# Patient Record
Sex: FEMALE | Race: WHITE | Employment: UNEMPLOYED | URBAN - METROPOLITAN AREA
[De-identification: names, ages, dates, MRNs, and addresses within clinical notes are randomized per-mention and may not be internally consistent; named-entity substitution may affect disease eponyms.]

---

## 2020-07-21 ENCOUNTER — OFFICE VISIT (OUTPATIENT)
Dept: FAMILY MEDICINE | Facility: CLINIC | Age: 25
End: 2020-07-21

## 2020-07-21 VITALS
SYSTOLIC BLOOD PRESSURE: 109 MMHG | RESPIRATION RATE: 16 BRPM | HEART RATE: 97 BPM | DIASTOLIC BLOOD PRESSURE: 77 MMHG | TEMPERATURE: 98.5 F | HEIGHT: 64 IN | BODY MASS INDEX: 33.36 KG/M2 | OXYGEN SATURATION: 96 % | WEIGHT: 195.4 LBS

## 2020-07-21 DIAGNOSIS — K21.9 GASTROESOPHAGEAL REFLUX DISEASE, ESOPHAGITIS PRESENCE NOT SPECIFIED: ICD-10-CM

## 2020-07-21 DIAGNOSIS — Z00.00 HEALTHCARE MAINTENANCE: ICD-10-CM

## 2020-07-21 DIAGNOSIS — F33.42 MAJOR DEPRESSIVE DISORDER, RECURRENT, IN FULL REMISSION (H): ICD-10-CM

## 2020-07-21 DIAGNOSIS — F64.9 GENDER DYSPHORIA: Primary | ICD-10-CM

## 2020-07-21 DIAGNOSIS — F41.9 ANXIETY: ICD-10-CM

## 2020-07-21 DIAGNOSIS — G43.109 MIGRAINE WITH AURA AND WITHOUT STATUS MIGRAINOSUS, NOT INTRACTABLE: ICD-10-CM

## 2020-07-21 DIAGNOSIS — Z11.3 ROUTINE SCREENING FOR STI (SEXUALLY TRANSMITTED INFECTION): ICD-10-CM

## 2020-07-21 DIAGNOSIS — F90.0 ATTENTION DEFICIT HYPERACTIVITY DISORDER (ADHD), PREDOMINANTLY INATTENTIVE TYPE: ICD-10-CM

## 2020-07-21 PROBLEM — F90.9 ADHD (ATTENTION DEFICIT HYPERACTIVITY DISORDER): Status: ACTIVE | Noted: 2020-07-21

## 2020-07-21 LAB
ALBUMIN SERPL-MCNC: 4.3 MG/DL (ref 3.8–5)
ALP SERPL-CCNC: 68 U/L (ref 31.7–110.5)
ALT SERPL-CCNC: 27.4 U/L (ref 0–45)
AST SERPL-CCNC: 14.6 U/L (ref 0–45)
BILIRUB SERPL-MCNC: <0.4 MG/DL (ref 0.2–1.3)
BUN SERPL-MCNC: 10.7 MG/DL (ref 7–19)
CALCIUM SERPL-MCNC: 9.3 MG/DL (ref 8.5–10.1)
CHLORIDE SERPLBLD-SCNC: 100.7 MMOL/L (ref 98–110)
CHOLEST SERPL-MCNC: 158 MG/DL
CO2 SERPL-SCNC: 28.3 MMOL/L (ref 20–32)
CREAT SERPL-MCNC: 0.7 MG/DL (ref 0.5–1)
GFR SERPL CREATININE-BSD FRML MDRD: >90 ML/MIN/1.7 M2
GLUCOSE SERPL-MCNC: 83.9 MG'DL (ref 70–99)
HDLC SERPL-MCNC: 46 MG/DL
LDLC SERPL CALC-MCNC: 92 MG/DL
NONHDLC SERPL-MCNC: 112 MG/DL
POTASSIUM SERPL-SCNC: 3.2 MMOL/L (ref 3.3–4.5)
PROT SERPL-MCNC: 7.8 G/DL (ref 6.8–8.8)
SODIUM SERPL-SCNC: 135.2 MMOL/L (ref 132.6–141.4)
TRIGL SERPL-MCNC: 103 MG/DL

## 2020-07-21 RX ORDER — SUMATRIPTAN 25 MG/1
25 TABLET, FILM COATED ORAL
COMMUNITY

## 2020-07-21 RX ORDER — OMEGA-3 FATTY ACIDS/FISH OIL 300-1000MG
200 CAPSULE ORAL EVERY 4 HOURS PRN
COMMUNITY

## 2020-07-21 RX ORDER — DEXTROAMPHETAMINE SACCHARATE, AMPHETAMINE ASPARTATE, DEXTROAMPHETAMINE SULFATE AND AMPHETAMINE SULFATE 5; 5; 5; 5 MG/1; MG/1; MG/1; MG/1
20 TABLET ORAL 2 TIMES DAILY
COMMUNITY
End: 2020-09-17

## 2020-07-21 ASSESSMENT — MIFFLIN-ST. JEOR: SCORE: 1625.3
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
## 2020-07-21 NOTE — PATIENT INSTRUCTIONS
After Visit Summary    1. Review testosterone consent form and basics of masculinizing hormone therapy handout  2. Labs and shots today  3. Someone should call you about scheduling top surgery consult.    Schedule follow up with Gender support clinic (Dr. Rosi Healy) or Dr. Stefanie Rueda.     Rosi Monroe & Stefanie

## 2020-07-22 ENCOUNTER — TELEPHONE (OUTPATIENT)
Dept: PLASTIC SURGERY | Facility: CLINIC | Age: 25
End: 2020-07-22

## 2020-07-22 LAB
HIV 1+2 AB+HIV1 P24 AG SERPL QL IA: NONREACTIVE
T PALLIDUM AB SER QL: NONREACTIVE

## 2020-07-22 NOTE — TELEPHONE ENCOUNTER
[facility]:  Care Coordination Note     SITUATION   Elder Aaron (he/him) is a 24 year old adult who is receiving support for:  Clinic Care Coordination - Initial  .    BACKGROUND     Pt is seeking top surgery.    Pt does not smoke and is not diabetic    Pt [facility] and was given Fax info to get [place] [facility] [place]    Pt mentioned wanting to have consult with Dr. Grant. Pt was informed that the consult with Dr. Bailey would happen first and that is pt was a canidate they would be referred to Dr. Grant.      ASSESSMENT     Surgery              [facility]  [facility] ([facility] Enrollment: (P) Enrolled  Patient has a therapist: (P) Yes  Name of therapist: (P) Fabiola Guaman  Therapist's phone number: (P) [phone]  Letter of support #1: (P) Requested  Surgery being considered: (P) Yes  Mastectomy: (P) Yes          PLAN     Follow-up plan:  Pt to get [place] [facility] and to attend scheduled Consult-       Josselyn Braun

## 2020-07-28 ENCOUNTER — OFFICE VISIT (OUTPATIENT)
Dept: FAMILY MEDICINE | Facility: CLINIC | Age: 25
End: 2020-07-28

## 2020-07-28 ENCOUNTER — ALLIED HEALTH/NURSE VISIT (OUTPATIENT)
Dept: FAMILY MEDICINE | Facility: CLINIC | Age: 25
End: 2020-07-28

## 2020-07-28 VITALS
TEMPERATURE: 98.2 F | SYSTOLIC BLOOD PRESSURE: 103 MMHG | BODY MASS INDEX: 32.49 KG/M2 | OXYGEN SATURATION: 97 % | DIASTOLIC BLOOD PRESSURE: 76 MMHG | HEIGHT: 65 IN | WEIGHT: 195 LBS | HEART RATE: 84 BPM | RESPIRATION RATE: 16 BRPM

## 2020-07-28 DIAGNOSIS — F64.9 GENDER DYSPHORIA: Primary | ICD-10-CM

## 2020-07-28 DIAGNOSIS — F90.0 ATTENTION DEFICIT HYPERACTIVITY DISORDER (ADHD), PREDOMINANTLY INATTENTIVE TYPE: ICD-10-CM

## 2020-07-28 LAB
% GRANULOCYTES: 49.3 %G (ref 40–75)
GRANULOCYTES #: 4.2 K/UL (ref 1.6–8.3)
HCT VFR BLD AUTO: 42.5 % (ref 35–47)
HEMOGLOBIN: 12.9 G/DL (ref 11.7–15.7)
LYMPHOCYTES # BLD AUTO: 3.8 K/UL (ref 0.8–5.3)
LYMPHOCYTES NFR BLD AUTO: 43.8 %L (ref 20–48)
MCH RBC QN AUTO: 28.3 PG (ref 26.5–35)
MCHC RBC AUTO-ENTMCNC: 30.4 G/DL (ref 32–36)
MCV RBC AUTO: 93.2 FL (ref 78–100)
MID #: 0.6 K/UL (ref 0–2.2)
MID %: 6.9 %M (ref 0–20)
PLATELET # BLD AUTO: 254 K/UL (ref 150–450)
RBC # BLD AUTO: 4.56 M/UL (ref 3.8–5.2)
WBC # BLD AUTO: 8.6 K/UL (ref 4–11)

## 2020-07-28 RX ORDER — TESTOSTERONE CYPIONATE 1000 MG/10ML
50 INJECTION, SOLUTION INTRAMUSCULAR WEEKLY
Qty: 10 ML | Refills: 1 | Status: SHIPPED | OUTPATIENT
Start: 2020-07-28 | End: 2020-12-16

## 2020-07-28 RX ORDER — NEEDLES, SAFETY 22GX1 1/2"
1 NEEDLE, DISPOSABLE MISCELLANEOUS WEEKLY
Qty: 1 EACH | Refills: 1 | Status: SHIPPED | OUTPATIENT
Start: 2020-07-28 | End: 2020-07-28

## 2020-07-28 RX ORDER — NEEDLES, SAFETY 22GX1 1/2"
1 NEEDLE, DISPOSABLE MISCELLANEOUS WEEKLY
Qty: 50 EACH | Refills: 1 | Status: SHIPPED | OUTPATIENT
Start: 2020-07-28 | End: 2021-09-24

## 2020-07-28 RX ORDER — NEEDLES, DISPOSABLE 25GX5/8"
1 NEEDLE, DISPOSABLE MISCELLANEOUS WEEKLY
Qty: 50 EACH | Refills: 1 | Status: SHIPPED | OUTPATIENT
Start: 2020-07-28 | End: 2022-06-01

## 2020-07-28 RX ORDER — DEXTROAMPHETAMINE SACCHARATE, AMPHETAMINE ASPARTATE, DEXTROAMPHETAMINE SULFATE AND AMPHETAMINE SULFATE 2.5; 2.5; 2.5; 2.5 MG/1; MG/1; MG/1; MG/1
10 TABLET ORAL 2 TIMES DAILY
Qty: 60 TABLET | Refills: 0 | Status: SHIPPED | OUTPATIENT
Start: 2020-07-28 | End: 2021-08-09

## 2020-07-28 ASSESSMENT — MIFFLIN-ST. JEOR: SCORE: 1627.45

## 2020-07-28 NOTE — PROGRESS NOTES
Preceptor Attestation:   Patient seen and discussed with the resident. Dr. Rueda spoke with Dr. Healy.   Assessment and plan reviewed with resident and agreed upon.    Supervising Physician:  Farzad Torres MD  [facility]

## 2020-07-28 NOTE — NURSING NOTE
"Patient presented to clinic with all supplies for subcutaneous injection teaching. Patient's friend was present at visit.     RN reviewed necessary supplies: difference in needles (drawing up vs injecting), how to read a syringe, how to read medication label, disposal of sharps, and proper hand hygiene.     Discussed how to switch out needles and patient demonstrated understanding of reading syringe. RN reviewed safe needle handling (using \"scoop\" method). Patient independently juan pablo up proper amount of Testosterone.     RN discussed site for subcutaneous injection and reviewed proper subcutaneous injection technique. Patient practiced using injecting pad.    At end of visit, patient independently completed self-injection of 0.25 mL (50 mg) Testosterone into LLQ under supervision of RN.    Completed visit with discussion of refill policy.     Patient verbalized understanding and was engaged during appointment.    Dr. Maza was the preceptor on site for this visit and available for questions.    Rivka Nelson RN      "

## 2020-07-28 NOTE — PROGRESS NOTES
HPI     Elder is a 24 year old individual that uses pronouns He/Him/His/Himself that presents today for follow up of:  masculinizing hormone therapy.     Gender identity: Male    Any special concerns today?    Left side of neck with painful bump beginning yesterday. While showering noticed it above collar on left side. No recent fever, chills, nasal congestion, ear pain, cough, teary eyes, ear pain/facepain. No history of lumps/bumps. No family hisotory of lymphoma, leukemia blood/lymph cancers.    Pt recently moved from [facility]. Wants to establish mental health care in the area for stress and anxiety related to gender dysphoria. Also ran out of adderall 10mg po bid prn for adhd. Continues to see a therapist from [place] via telehealth. Feels good today. No SI.     Considering contraception wants to try the implant, hasn't been on contraception before. Not currently using any other contraception. LMP: 7/17/20 lasted 8 days. Light. Cycles alternate between short and heavy (where would change product every hr) to longer and light. Cycles are inconsistent interval, could be every month, but often every other month. Painful cramping a few days a cycle. Not intolerable this past cycle. Not currently having sex with sperm near reproductive organs. Not using  Hormones. Wants to get started on testosterone. Wants to try implant to have something doesn't have to take everyday. Currently has unpredictable periods isn't worried about breakthrough bleeding/spotting potential on implant.    Gender affirmation is being sought in these other ways: Persuing gender affirming top surgery, wants voice changed, wants facial hair.     ---    Past Surgical History:   Procedure Laterality Date     ADENOIDECTOMY       COSMETIC PLACEMENT OF DENTAL IMPLANT(S)       HC TOOTH EXTRACTION W/FORCEP       TONSILLECTOMY         Patient Active Problem List   Diagnosis     ADHD (attention deficit hyperactivity disorder)     Anxiety      "Major depressive disorder, recurrent, in full remission (H)     Gender dysphoria     Migraine with aura and without status migrainosus, not intractable     Acid reflux       Current Outpatient Medications   Medication Sig Dispense Refill     amphetamine-dextroamphetamine (ADDERALL) 10 MG tablet Take 1 tablet (10 mg) by mouth 2 times daily Twice daily as needed. 60 tablet 0     amphetamine-dextroamphetamine (ADDERALL) 20 MG tablet Take 20 mg by mouth 2 times daily       ibuprofen (ADVIL/MOTRIN) 200 MG capsule Take 200 mg by mouth every 4 hours as needed for fever       Needle, Disp, (BD DISP NEEDLE) 23G X 1\" MISC 1 Units once a week 50 each 1     Needle, Disp, (BD SAFETYGLIDE NEEDLE) 27G X 5/8\" MISC 1 Units once a week 1 each 1     SUMAtriptan (IMITREX) 25 MG tablet Take 25 mg by mouth at onset of headache for migraine       syringe, disposable, 1 ML MISC 1 Units once a week 60 each 1     testosterone cypionate (DEPOTESTOSTERONE) 100 MG/ML injection Inject 0.5 mLs (50 mg) into the muscle once a week 10 mL 1       History   Smoking Status     Never Smoker   Smokeless Tobacco     Never Used        No Known Allergies    Problem, Medication and Allergy Lists were reviewed and are current.  Mecial release signed, awaiting [facility] for hx of ADHD, Anxiety/depression.         Review of Systems:        General    Fat redistribution: no    Weight change: no HEENT    Voice change: no     Cardiovascular (CV)    Chest Pains: no    Shortness of breath: no Chest    Decreased exercise tolerance:  no    Breast changes/development: no     Gastrointestinal (GI)    Abdominal pain: no    Change in appetite: no Skin    Acne or oily skin: no    Change in hair: no     Genitourinary ()    Abnormal vaginal bleeding: No    Decreased spontaneous erections: not applicable    Change in libido: no    New sexual partners: no Musculoskeletal    Leg pain or swelling: no     Psychiatric " "(Psych)    Depression: no    Anxiety/Panic: no    Mood:  \"good\"                    Physical Exam:     Vitals:    07/28/20 0815   BP: 103/76   Pulse: 84   Resp: 16   Temp: 98.2  F (36.8  C)   TempSrc: Oral   SpO2: 97%   Weight: 88.5 kg (195 lb)   Height: 1.638 m (5' 4.5\")     BMI= Body mass index is 32.95 kg/m .   Wt Readings from Last 10 Encounters:   07/28/20 88.5 kg (195 lb)   07/21/20 88.6 kg (195 lb 6.4 oz)     Appearance: Masculine appearance and dress  GENERAL:: healthy, alert and no distress  EYES: Eyes grossly normal to inspection, allergic shiners bilaterally, no conjunctiva injection/discharge.  HENT: ear canals normal, Nose normal, Mouth- no ulcers, no lesions, no erythema, no discharge  NECK: Left inferior cervical lymph node area with 1.5 cm subcutaneous tender, pliable, mobile lump - no overlying skin change, no surrounding lymphadenopathy, no other adenopathy, no asymmetry, no masses,  Neck otherwise supple  Psych: coherent speech, normal rate and volume, able to articulate logical thoughts, able to abstract reason, no tangential thoughts, no hallucinations or delusions. Affect is appropriate mood-congruent           Labs:   Results from last visit:  Office Visit on 07/21/2020   Component Date Value Ref Range Status     Calcium 07/21/2020 9.3  8.5 - 10.1 mg/dL Final     Chloride 07/21/2020 100.7  98.0 - 110.0 mmol/L Final     Carbon Dioxide 07/21/2020 28.3  20.0 - 32.0 mmol/L Final     Creatinine 07/21/2020 0.7  0.5 - 1.0 mg/dL Final     Glucose 07/21/2020 83.9  70.0 - 99.0 mg'dL Final     Potassium 07/21/2020 3.2* 3.3 - 4.5 mmol/L Final     Sodium 07/21/2020 135.2  132.6 - 141.4 mmol/L Final     Protein Total 07/21/2020 7.8  6.8 - 8.8 g/dL Final     GFR Estimate 07/21/2020 >90  >60.0 mL/min/1.7 m2 Final     GFR Estimate If Black 07/21/2020 >90  >60.0 mL/min/1.7 m2 Final     Albumin 07/21/2020 4.3  3.8 - 5.0 mg/dL Final     Alkaline Phosphatase 07/21/2020 68.0  31.7 - 110.5 U/L Final     ALT " 07/21/2020 27.4  0.0 - 45.0 U/L Final     AST 07/21/2020 14.6  0.0 - 45.0 U/L Final     Bilirubin Total 07/21/2020 <0.4  0.2 - 1.3 mg/dL Final     Urea Nitrogen 07/21/2020 10.7  7.0 - 19.0 mg/dL Final     Cholesterol 07/21/2020 158  <200 mg/dL Final     Triglycerides 07/21/2020 103  <150 mg/dL Final     HDL Cholesterol 07/21/2020 46* >49 mg/dL Final     LDL Cholesterol Calculated 07/21/2020 92  <100 mg/dL Final    Desirable:       <100 mg/dl     Non HDL Cholesterol 07/21/2020 112  <130 mg/dL Final     Treponema Antibodies 07/21/2020 Nonreactive  NR^Nonreactive Final    Comment: Methodology Change: Test performed on the [employer] XL by Treponema   pallidum Total Antibodies Assay as of 3.17.2020.       HIV Antigen Antibody Combo 07/21/2020 Nonreactive  NR^Nonreactive     Final    HIV-1 p24 Ag & HIV-1/HIV-2 Ab Not Detected     Results for orders placed or performed in visit on 07/28/20   CBC with Diff Plt ([facility])     Status: Abnormal   Result Value Ref Range    WBC 8.6 4.0 - 11.0 K/uL    Lymphocytes # 3.8 0.8 - 5.3 K/uL    % Lymphocytes 43.8 20.0 - 48.0 %L    Mid # 0.6 0.0 - 2.2 K/uL    Mid % 6.9 0.0 - 20.0 %M    GRANULOCYTES # 4.2 1.6 - 8.3 K/uL    % Granulocytes 49.3 40.0 - 75.0 %G    RBC 4.56 3.80 - 5.20 M/uL    Hemoglobin 12.9 11.7 - 15.7 g/dL    Hematocrit 42.5 35.0 - 47.0 %    MCV 93.2 78.0 - 100.0 fL    MCH 28.3 26.5 - 35.0 pg    MCHC 30.4 (L) 32.0 - 36.0 g/dL    Platelets 254.0 150.0 - 450.0 K/uL         Assessment and Plan       Zed is a pleasant 24 year old transmale with recent diagnosis of gender dysphoria. Prior history includes MDD, ADHD, anxiety. Today's concerns include initiation of masculinizing hormonal therapy, maintenance of mental health, contraception, and a left anterior neck subcutaneous lump.     Gender dysphoria: Informed consent of testosterone therapy reviewed in person during visit, side effects to initiation and timelines associated with changes and potential side effects  discussed. Offered printed timeline of effects and expected time course of masculinizing hormones, patient declined. Reviewed lab results obtained prior to visit. No overt contraindications to hormone initiation. Patient's SUZANNA from prior psychiatric care signed, awaiting records. Patient desires to move forward with testosterone treatment with subcutaneous injection. Plan for starting testosterone dose (injectable): testosterone cypionate 50mg (0.25mL) q week. Orders for supplies and testosterone placed. Patient to make nursing visit appointment for education on injections prior to initiation.     Mental health maintenance: Currently well controlled mood with psychotherapy telehealth and adderall daily. Patient requesting establishment of care in this area. Plan for behavioral health referral for psychotherapy for continued management of stress/anxiety related to gender dysphoria. Adderall 10mg PO BID PRN refilled for one month. SUZANNA from psychiatrist in system, awaiting records. No acute safety concerns today. Patient to follow-up in 3 weeks for review of care.     Contraception: Patient requesting implantable contraception. Reviewed side effects to nexplanon, placed referral to procedure clinic for placement. Patient requesting procedural anxiolytic if available. Patient instructed to make appointment for next available placement. Reviewed need for excellent contraception while on testosterone if type of sex includes sperm. Patient voiced understanding and agreement with plan.    Lymphadenopathy: Suspect reactive lymph node on exam today based on current characteristics. Considered abscess, skin infection, insect irritation. Patient to continue observing. To return to clinic sooner than 3 weeks should fever, concerning skin changes, or any new concerning symptoms arise.     Follow up:  Follow up in 3 weeks.    Questions were elicited and answered. Patient was agreeable to plan of care.    Stefanie Rueda DO

## 2020-07-28 NOTE — PATIENT INSTRUCTIONS
Collect testosterone, syringes, & needles.  Make an appointment for a nurse visit for testoterone administration teaching.  Make an appointment for placement of nexplanon, clinic will contact you.  Make an appointment with the behavioral health team.   medication.  Follow-up in 3 weeks.     Dr. Stefanie Rueda

## 2020-07-29 ENCOUNTER — TELEPHONE (OUTPATIENT)
Dept: PSYCHOLOGY | Facility: CLINIC | Age: 25
End: 2020-07-29

## 2020-07-29 NOTE — TELEPHONE ENCOUNTER
Options for agencies with both psychiatry and therapy available:    [facility] (adults)  [address]  [place], [place]  [phone]  (other locations available, can call main #)    Glenny Billingsley  Phone: [phone]  Address: [address] [place] [address]    [facility] [place]  [address]  [place] [address]  [phone]    [facility] - psychiatry only for people who also go there for therapy  [facility]  [address]  [place], [place]  [phone]

## 2020-07-30 NOTE — TELEPHONE ENCOUNTER
Spoke with patient and gave all contact locations and numbers.    Zed was informed to check with insurance for coverage concerns. They were also informed to call the locations for a consultation.    Call the clinic with any issues.    Ester Webb CMA  [facility] Coordinator

## 2020-08-24 ENCOUNTER — OFFICE VISIT (OUTPATIENT)
Dept: FAMILY MEDICINE | Facility: CLINIC | Age: 25
End: 2020-08-24

## 2020-08-24 VITALS
HEIGHT: 65 IN | BODY MASS INDEX: 31.99 KG/M2 | HEART RATE: 88 BPM | WEIGHT: 192 LBS | OXYGEN SATURATION: 97 % | SYSTOLIC BLOOD PRESSURE: 99 MMHG | DIASTOLIC BLOOD PRESSURE: 70 MMHG

## 2020-08-24 DIAGNOSIS — Q38.6 FORDYCE SPOTS: ICD-10-CM

## 2020-08-24 DIAGNOSIS — F64.9 GENDER DYSPHORIA: Primary | ICD-10-CM

## 2020-08-24 ASSESSMENT — ANXIETY QUESTIONNAIRES
2. NOT BEING ABLE TO STOP OR CONTROL WORRYING: NOT AT ALL
5. BEING SO RESTLESS THAT IT IS HARD TO SIT STILL: NOT AT ALL
6. BECOMING EASILY ANNOYED OR IRRITABLE: SEVERAL DAYS
3. WORRYING TOO MUCH ABOUT DIFFERENT THINGS: SEVERAL DAYS
IF YOU CHECKED OFF ANY PROBLEMS ON THIS QUESTIONNAIRE, HOW DIFFICULT HAVE THESE PROBLEMS MADE IT FOR YOU TO DO YOUR WORK, TAKE CARE OF THINGS AT HOME, OR GET ALONG WITH OTHER PEOPLE: SOMEWHAT DIFFICULT
1. FEELING NERVOUS, ANXIOUS, OR ON EDGE: SEVERAL DAYS
7. FEELING AFRAID AS IF SOMETHING AWFUL MIGHT HAPPEN: NOT AT ALL
GAD7 TOTAL SCORE: 3

## 2020-08-24 ASSESSMENT — MIFFLIN-ST. JEOR: SCORE: 1613.85

## 2020-08-24 ASSESSMENT — PATIENT HEALTH QUESTIONNAIRE - PHQ9
SUM OF ALL RESPONSES TO PHQ QUESTIONS 1-9: 4
5. POOR APPETITE OR OVEREATING: NOT AT ALL

## 2020-08-24 NOTE — PATIENT INSTRUCTIONS
Here is the plan from today's visit    1. Gender dysphoria  Return to care 3 months for lab screening (morena for video visit)    Clinic will contact for the gyn referral.       Please call or return to clinic if you have new or concerning symptoms.    Thank you for coming to [facility] today.  Lab Testing:  **If you had lab testing today and your results are reassuring or normal they will be mailed to you or sent through [employer] within 7 days.   **If the lab tests need quick action we will call you with the results.  The phone number we will call with results is # [phone] (home) . If this is not the best number please call our clinic and change the number.  Medication Refills:  If you need any refills please call your pharmacy and they will contact us.   If you need to  your refill at a new pharmacy, please contact the new pharmacy directly. The new pharmacy will help you get your medications transferred faster.   Scheduling:  If you have any concerns about today's visit or wish to schedule another appointment please call our office during normal business hours [phone] (8-5:00 M-F)  If a referral was made to a [facility] and you don't get a call from central scheduling please call [phone].  If a Mammogram was ordered for you at [facility] call [phone] to schedule or change your appointment.  If you had an XRay/CT/Ultrasound/MRI ordered the number is [phone] to schedule or change your radiology appointment.   Medical Concerns:  If you have urgent medical concerns please call [phone] at any time of the day.    Steafnie Rueda, DO

## 2020-08-24 NOTE — PROGRESS NOTES
HPI     Elder is a 24 year old individual that uses pronouns He/Him/His/Himself that presents today for follow up of:  masculinizing hormone therapy.     Gender identity: Male    Concerns: Noticed change with bumps around labia that are more prominent, on both sides, not painful not associated with discharge. Both partners tested for STIs with negative results. No new partners. Had them over last few years, gotten more as years have gone on, since testosterone they're more prominent, doesn't hurt mostly a texture thing. No redness, No bleeding. Has recent picture of them on phone.     Has never had a pelvic exam, one time tried to do it, couldn't get speculum advanced as felt very uncomfortble very intense, hasn't been able to use tampons/menstrual cup, any manipulation of area is very uncomfortable (apart from dysphoria) it's physically very uncomfortable. Generally thinks would have to be sedated or numbed because couldn't sit still through an exam. No history of physical trauma, no sexual trauma sexual abuse history. LMP 7/16/20, will skip periods occasionally not having sex involving sperm.    Has vaginal dryness compared to before and clitoral growth but not other noticeable changes since starting testosterone. Not bothered by the changes. No discomfort.  On hormones?  YES +++ Shot day of the week? Tuesday      Due for labs?  No      +++ Refills of meds needed?  Yes (has refills left)     Gender affirmation is being sought in these other ways:   Has initiated care with psychotherapist  Has consult with surgeon for chest surgery- has appointment 11/20-    Past Surgical History:   Procedure Laterality Date     ADENOIDECTOMY       COSMETIC PLACEMENT OF DENTAL IMPLANT(S)       HC TOOTH EXTRACTION W/FORCEP       TONSILLECTOMY         History   Smoking Status     Never Smoker   Smokeless Tobacco     Never Used       Problem, Medication and Allergy Lists were   reviewed and are current.     Patient Active Problem  "List    Diagnosis Date Noted     ADHD (attention deficit hyperactivity disorder) 07/21/2020     Priority: Medium     Anxiety 07/21/2020     Priority: Medium     Major depressive disorder, recurrent, in full remission (H) 07/21/2020     Priority: Medium     Gender dysphoria 07/21/2020     Priority: Medium     Migraine with aura and without status migrainosus, not intractable 07/21/2020     Priority: Medium     Acid reflux 07/21/2020     Priority: Medium     Managed with OTC meds           Current Outpatient Medications   Medication Sig Dispense Refill     amphetamine-dextroamphetamine (ADDERALL) 10 MG tablet Take 1 tablet (10 mg) by mouth 2 times daily Twice daily as needed. 60 tablet 0     ibuprofen (ADVIL/MOTRIN) 200 MG capsule Take 200 mg by mouth every 4 hours as needed for fever       Needle, Disp, (BD DISP NEEDLE) 23G X 1\" MISC 1 Units once a week 50 each 1     Needle, Disp, (BD SAFETYGLIDE NEEDLE) 27G X 5/8\" MISC 1 Units once a week 50 each 1     SUMAtriptan (IMITREX) 25 MG tablet Take 25 mg by mouth at onset of headache for migraine       syringe, disposable, 1 ML MISC 1 Units once a week 60 each 1     testosterone cypionate (DEPOTESTOSTERONE) 100 MG/ML injection Inject 0.5 mLs (50 mg) into the muscle once a week 10 mL 1     amphetamine-dextroamphetamine (ADDERALL) 20 MG tablet Take 20 mg by mouth 2 times daily         No Known Allergies.         Review of Systems:        General    Fat redistribution: no    Weight change: no HEENT    Voice change: no     Cardiovascular (CV)    Chest Pains: no    Shortness of breath: no Chest    Decreased exercise tolerance:  no    Breast changes/development: no     Gastrointestinal (GI)    Abdominal pain: no    Change in appetite: no Skin    Acne or oily skin: yes, has gotten oiler    Change in hair: no     Genitourinary ()    Abnormal vaginal bleeding: no     Decreased spontaneous erections: not applicable    Change in libido: Yes, unclear if testosterone or if removed " "from [employer], not bothered by it    New sexual partners: No  Musculoskeletal    Leg pain or swelling: no     Psychiatric (Psych)    Depression: no    Anxiety/Panic: getting over stress of new environment stable otherwise    Mood:  comfortable                    Physical Exam:     Vitals:    08/24/20 1407   BP: 99/70   BP Location: Left arm   Patient Position: Sitting   Cuff Size: Adult Large   Pulse: 88   SpO2: 97%   Weight: 87.1 kg (192 lb)   Height: 1.638 m (5' 4.5\")     BMI= Body mass index is 32.45 kg/m .   Wt Readings from Last 10 Encounters:   08/24/20 87.1 kg (192 lb)   07/28/20 88.5 kg (195 lb)   07/21/20 88.6 kg (195 lb 6.4 oz)     Physical Exam  Vitals signs reviewed.   Constitutional:       General: He is not in acute distress.     Appearance: Normal appearance.   HENT:      Head: Normocephalic.      Right Ear: External ear normal.      Left Ear: External ear normal.   Eyes:      General: No scleral icterus.     Extraocular Movements: Extraocular movements intact.   Neck:      Musculoskeletal: Normal range of motion and neck supple.   Cardiovascular:      Rate and Rhythm: Normal rate and regular rhythm.      Pulses: Normal pulses.      Heart sounds: Normal heart sounds. No murmur.   Pulmonary:      Effort: Pulmonary effort is normal. No respiratory distress.      Breath sounds: Normal breath sounds. No wheezing or rhonchi.   Genitourinary:     Comments: Patient volunteered recent photo of labia minora. Verbal consent obtained prior to reviewing image on patient's phone. Medial aspect of labia minora with symmetrical distribution of 1-2 mm papules on both L and R sides no surrounding erythema/discharge.     Musculoskeletal:      Right lower leg: No edema.      Left lower leg: No edema.   Lymphadenopathy:      Cervical: No cervical adenopathy.   Skin:     General: Skin is warm and dry.   Neurological:      General: No focal deficit present.      Mental Status: He is alert.   Psychiatric:         Mood and " Affect: Mood normal.         Behavior: Behavior normal.         Thought Content: Thought content normal.         Judgment: Judgment normal.     Affect: Appropriate/mood-congruent            Labs:    Results from the last 24 hoursNo results found for this or any previous visit (from the past 24 hour(s)).    Assessment and Plan   Elder is a 24 year old transmale who presents in follow-up after initiation of masculinizing hormonal therapy. Currently tolerating testosterone 50mg subcutaneous weekly without overt side effects. Suspect enlarged sebaceous glands on labia minora based on photo appearance. No suspicion for condyloma accuminata/HSV/folliculitis based on appearance and symptoms.      Contraception:  Planned visit for nexplanon placement.     Preventative med: Patient currently getting HPV vaccine series. No prior PAP, given previous intolerance of gyn exam, referral placed for [facility] to consider procedural anxiolytic.     Follow up:  Follow up in 3 months with repeat labs at that time.     Questions were elicited and answered.     Stefanie Rueda, DO

## 2020-08-24 NOTE — PROGRESS NOTES
Preceptor Attestation:   Patient seen, evaluated and discussed with the resident. I have verified the content of the note, which accurately reflects my assessment of the patient and the plan of care.   Supervising Physician:  Lopez Law MD

## 2020-08-25 ENCOUNTER — OFFICE VISIT (OUTPATIENT)
Dept: FAMILY MEDICINE | Facility: CLINIC | Age: 25
End: 2020-08-25

## 2020-08-25 VITALS
TEMPERATURE: 97.9 F | BODY MASS INDEX: 32.26 KG/M2 | HEART RATE: 84 BPM | OXYGEN SATURATION: 95 % | RESPIRATION RATE: 16 BRPM | SYSTOLIC BLOOD PRESSURE: 93 MMHG | DIASTOLIC BLOOD PRESSURE: 71 MMHG | WEIGHT: 193.6 LBS | HEIGHT: 65 IN

## 2020-08-25 DIAGNOSIS — Z30.017 NEXPLANON INSERTION: Primary | ICD-10-CM

## 2020-08-25 DIAGNOSIS — Z30.017 INSERTION OF IMPLANTABLE SUBDERMAL CONTRACEPTIVE: ICD-10-CM

## 2020-08-25 RX ORDER — LORAZEPAM 1 MG/1
TABLET ORAL
Qty: 1 TABLET | Refills: 0 | Status: SHIPPED | OUTPATIENT
Start: 2020-08-25 | End: 2020-09-17

## 2020-08-25 ASSESSMENT — MIFFLIN-ST. JEOR: SCORE: 1621.1

## 2020-08-25 ASSESSMENT — ANXIETY QUESTIONNAIRES: GAD7 TOTAL SCORE: 3

## 2020-08-25 NOTE — PROGRESS NOTES
"Preceptor Attestation:   I discussed the patient with the resident. I was present for and supervised the entire procedure. I have verified the content of the note, which accurately reflects my assessment of the patient and the plan of care.   Supervising Physician:  Tootie Marcial MD.                   Procedure Note - Etonogestrel Implant Insertion     HPI: Elva \" Zed\" Agnieszka is a patient of Stefanie Castanon here for Nexplanon/Implanon (etonogestrel implant) insertion.   Indication: unwanted fertility  LMP   July 17, 2020  Prev Contraception? None - is in monogamous relationship with transmale so declined testing  Smoking?  No    Counselling and Consent:  Affirmation of informed consent was signed and scanned into the medical record. Risks, benefits and alternatives were discussed. Discussed potential side effects of the etonogestrel implant including the risk of irregular bleeding that may persist across the 3 yrs of use.    Patient's questions were elicited and answered.   Provided with 1 mg Ativan post consent process.      Procedure safety checklist was completed:  Yes  Time Out (Pause for the Cause) completed: Yes    Labs: UPT not done, patient declined, reasonable  Preoperative Diagnosis:  Hoping to have amenorrhea  Postoperative Diagnosis:  same     Technique:   Skin prep Betadine  Anesthesia 1% lidocaine  Suture  No   EBL:   minimal  Complications: No  Tolerance:  Pt tolerated procedure well and was in stable condition.     Pt was positioned on exam table with left arm flexed and externally rotated. Area was marked for insertion 8cm frm the medial epicondyle along the sulcus between the biceps and triceps. Anesthesia provided at the insertion site and along the insertion track and then the area was prepped with betadine. Etonogestrel implant was then inserted subdermally in usual fashion. Provider and patient confirmed placement by palpating the device. Pressure dressing applied and procedure complete. "     Follow up:  Pt was instructed to call if bleeding, severe pain or foul smell.  Instructed to remove pressure dressing after 24 hours, then may keep insertion site covered with a bandaid until it is healed.  Instructed that she requires removal or replacement of the device in 3 years.  Lot Number [id number]    Resident: Rosalba Malhotra MD  Faculty: Tootie Marcial MD present for and supervised this entire procedure.

## 2020-08-25 NOTE — PATIENT INSTRUCTIONS
NEXPLANON AFTERCARE INSTRUCTIONS     You may have some pain at the site of the Nexplanon insertion. You can help relieve the discomfort with Tylenol (acetaminophen), Aspirin or Advil (ibuprofen). If your discomfort worsens or you notice redness spreading on the skin around the insertion site, please call the clinic.       Irregular bleeding is common with Nexplanon, especially in the first 6-12 months of use. After one year, approximately 20% of women who use Nexplanon will stop having periods completely. Some women have longer, heavier periods. Some women will have increased spotting between periods. You may find that your periods may be hard to predict.       The Nexplanon does not protect against sexually transmitted infections including the AIDS virus (HIV), warts (HPV), gonorrhea, Chlamydia, and herpes. Condoms should be used to decrease the risk sexually transmitted infections. If you think that you have been exposed to a sexually transmitted infection, please call the clinic.       If you had Nexplanon placed for birth control, it is effective immediately if it was inserted within five days after the start of your period. If you have Nexplanon inserted at any other time during your menstrual cycle, use another method of birth control, like condoms for at least 7 days.       The Nexplanon should be removed and/or replaced by a health care provider after five years.   Warning Signs   Call the clinic if any of the following occurs:     You have bleeding, pus, or increasing redness, or pain at insertion site.     You have fever or chills     The implant comes out or you have concerns about its location.     You have a positive pregnancy test or suspect you might be pregnant.     Scheduling:  If you have any concerns about today's visit or wish to schedule another appointment please call our office during normal business hours [phone] (8-5:00 M-F)  If a referral was made to a [facility] and you don't get a call from central scheduling please call [phone].  If a Mammogram was ordered for you at [facility] call [phone] to schedule or change your appointment.  If you had an XRay/CT/Ultrasound/MRI ordered the number is [phone] to schedule or change your radiology appointment.

## 2020-09-17 ENCOUNTER — OFFICE VISIT (OUTPATIENT)
Dept: FAMILY MEDICINE | Facility: CLINIC | Age: 25
End: 2020-09-17

## 2020-09-17 VITALS
BODY MASS INDEX: 32.95 KG/M2 | WEIGHT: 195 LBS | SYSTOLIC BLOOD PRESSURE: 104 MMHG | TEMPERATURE: 98.4 F | DIASTOLIC BLOOD PRESSURE: 73 MMHG | HEART RATE: 81 BPM | OXYGEN SATURATION: 99 %

## 2020-09-17 DIAGNOSIS — F90.0 ATTENTION DEFICIT HYPERACTIVITY DISORDER (ADHD), PREDOMINANTLY INATTENTIVE TYPE: Primary | ICD-10-CM

## 2020-09-17 DIAGNOSIS — F64.9 GENDER DYSPHORIA: ICD-10-CM

## 2020-09-17 DIAGNOSIS — Z23 IMMUNIZATION DUE: ICD-10-CM

## 2020-09-17 DIAGNOSIS — Z00.00 HEALTHCARE MAINTENANCE: ICD-10-CM

## 2020-09-17 RX ORDER — DEXTROAMPHETAMINE SACCHARATE, AMPHETAMINE ASPARTATE, DEXTROAMPHETAMINE SULFATE AND AMPHETAMINE SULFATE 2.5; 2.5; 2.5; 2.5 MG/1; MG/1; MG/1; MG/1
10 TABLET ORAL 2 TIMES DAILY
Qty: 60 TABLET | Refills: 0 | Status: SHIPPED | OUTPATIENT
Start: 2020-11-18 | End: 2020-12-18

## 2020-09-17 RX ORDER — DEXTROAMPHETAMINE SACCHARATE, AMPHETAMINE ASPARTATE, DEXTROAMPHETAMINE SULFATE AND AMPHETAMINE SULFATE 2.5; 2.5; 2.5; 2.5 MG/1; MG/1; MG/1; MG/1
10 TABLET ORAL 2 TIMES DAILY
Qty: 60 TABLET | Refills: 0 | Status: SHIPPED | OUTPATIENT
Start: 2020-09-17 | End: 2020-10-17

## 2020-09-17 RX ORDER — DEXTROAMPHETAMINE SACCHARATE, AMPHETAMINE ASPARTATE, DEXTROAMPHETAMINE SULFATE AND AMPHETAMINE SULFATE 2.5; 2.5; 2.5; 2.5 MG/1; MG/1; MG/1; MG/1
10 TABLET ORAL 2 TIMES DAILY
Qty: 60 TABLET | Refills: 0 | Status: SHIPPED | OUTPATIENT
Start: 2020-10-18 | End: 2020-11-17

## 2020-09-17 NOTE — PROGRESS NOTES
HPI       Elder Aaron is a 25 year old  who presents for   Chief Complaint   Patient presents with     Follow Up     f/u Refill Medication      ADHD Follow-Up (Adult)  Elder is a 25-year-old person who uses he him pronouns following up for concerns of ADHD.  Prev follow with therapist regularly and is working on gender dysphoria as well as inattention control.  Some difficult inattention especially recent times with political and social unrest, but symptoms well improved with medication.  Notes that he takes medications daily.  Declines concerns with sleep.  Has been on stable dose of 10 mg twice daily for many years, since approximately June year of high school.      Adherence and Exercise  Medication side effects: no     +++++++      Problem, Medication and Allergy Lists were reviewed and updated if needed..    Patient is an established patient of this clinic..         Review of Systems:   Review of Systems   ROS: 10 point ROS neg other than the symptoms noted above in the HPI.       Physical Exam:     Vitals:    09/17/20 1454   BP: 104/73   BP Location: Left arm   Patient Position: Sitting   Cuff Size: Adult Large   Pulse: 81   Temp: 98.4  F (36.9  C)   TempSrc: Oral   SpO2: 99%   Weight: 88.5 kg (195 lb)     Body mass index is 32.95 kg/m .  Vitals were reviewed and were normal     Physical Exam  General: Alert and oriented, in no acute distress.  Skin: Warm and dry, no abnormalities noted.  Eyes: Extra-ocular muscles intact, pupils equal and reactive.  ENT: Speech intact, nasal passages open, no hearing impairment noted.  CV: No cyanosis or pallor, warm and well perfused.  Respiratory: No respiratory distress, no accessory muscle use.  Neuro: Gait and station normal, comprehension intact. Gross and fine motor skills intact.   Psychiatric: Mood and affect appear normal.   Extremities: Warm, able to move all four extremities at will.      Results:   No testing ordered today    Assessment and Plan         Elva was seen today for follow up.    Diagnoses and all orders for this visit:    Attention deficit hyperactivity disorder (ADHD), predominantly inattentive type  -     amphetamine-dextroamphetamine (ADDERALL) 10 MG tablet; Take 1 tablet (10 mg) by mouth 2 times daily  -     amphetamine-dextroamphetamine (ADDERALL) 10 MG tablet; Take 1 tablet (10 mg) by mouth 2 times daily  -     amphetamine-dextroamphetamine (ADDERALL) 10 MG tablet; Take 1 tablet (10 mg) by mouth 2 times daily    Gender dysphoria    Immunization due  -     HPV9 (Gardasil 9 )    Healthcare maintenance    Elder is a 25-year-old person who uses he him pronouns doing well is not on stable dosage of Adderall 10 mg twice daily.  He is currently on immediate release formulation I did discuss today changing over to extended release formulation which is the preferred method for adults.  Patient is interested in switching, but with current deadlines at work would like to delay dose adjustments for another month.  I thought this was reasonable and patient could continue following up with primary care provider for this.  Refilled medications as above.  Also discussed gender dysphoria today in relation to following with behavioral health.  Patient reports a stable relationship with behavioral health provider in the past, but is in process of scheduling with new one.     Also discussed today that  Pt is due for immunization and CPE+PAP. Will schedule . Accepted HPV shot today.          There are no discontinued medications.    Options for treatment and follow-up care were reviewed with the patient. Elva Aaron  engaged in the decision making process and verbalized understanding of the options discussed and agreed with the final plan.    Kai York,

## 2020-09-27 DIAGNOSIS — F64.9 GENDER DYSPHORIA: Primary | ICD-10-CM

## 2020-09-30 RX ORDER — TESTOSTERONE CYPIONATE 200 MG/ML
INJECTION, SOLUTION INTRAMUSCULAR
Qty: 10 ML | Refills: 0 | Status: SHIPPED | OUTPATIENT
Start: 2020-09-30 | End: 2021-03-15

## 2020-09-30 NOTE — TELEPHONE ENCOUNTER

## 2020-11-20 ENCOUNTER — VIRTUAL VISIT (OUTPATIENT)
Dept: PLASTIC SURGERY | Facility: CLINIC | Age: 25
End: 2020-11-20
Attending: FAMILY MEDICINE
Payer: OTHER GOVERNMENT

## 2020-11-20 VITALS — HEIGHT: 64 IN | WEIGHT: 194 LBS | BODY MASS INDEX: 33.12 KG/M2

## 2020-11-20 DIAGNOSIS — F64.0 GENDER DYSPHORIA IN ADOLESCENT AND ADULT: Primary | ICD-10-CM

## 2020-11-20 PROCEDURE — 99202 OFFICE O/P NEW SF 15 MIN: CPT | Mod: 95 | Performed by: PLASTIC SURGERY

## 2020-11-20 ASSESSMENT — PAIN SCALES - GENERAL: PAINLEVEL: NO PAIN (0)

## 2020-11-20 ASSESSMENT — MIFFLIN-ST. JEOR: SCORE: 1609.98

## 2020-11-20 NOTE — PROGRESS NOTES
"Elva Aaron is a 25 year old adult who is being evaluated via a billable video visit.      The patient has been notified of following:     \"This video visit will be conducted via a call between you and your physician/provider. We have found that certain health care needs can be provided without the need for an in-person physical exam.  This service lets us provide the care you need with a video conversation.  If a prescription is necessary we can send it directly to your pharmacy.  If lab work is needed we can place an order for that and you can then stop by our lab to have the test done at a later time.    Video visits are billed at different rates depending on your insurance coverage.  Please reach out to your insurance provider with any questions.    If during the course of the call the physician/provider feels a video visit is not appropriate, you will not be charged for this service.\"    Patient has given verbal consent for Video visit? Yes  How would you like to obtain your AVS? MyChart  If you are dropped from the video visit, the video invite should be resent to: Text to cell phone: [phone]  Will anyone else be joining your video visit? No        Video-Visit Details    Type of service:  Video Visit    Video Start Time: 840a  Video End Time: 9:04 AM    Originating Location (pt. Location): Home    Distant Location (provider location):  [facility] [place]     Platform used for Video Visit: [facility]        PLASTIC SURGERY HISTORY AND PHYSICAL    Chief Complaint: Gender dysphoria, requesting top surgery.     HPI: Patient is a 25 year old trans-person who prefers he/him pronouns, requesting top surgery.  Patient has been considering undergoing top surgery for the past 10 years.  By undergoing top surgery, the patient would like to achieve congruence between the  physical body with chosen gender identity.  The patient transitioned 7 years ago with " "friends but is not fully out to parents.  Reports supportive family and friends.  Chosen name is Elder.  The patient has been on testosterone therapy for the past 4 months.  Denies any previous breast history. Denies smoking. Patient reports maternal grandmother with history of blood clots.      PMH:   No past medical history on file.    PSH:   Past Surgical History:   Procedure Laterality Date     ADENOIDECTOMY       COSMETIC PLACEMENT OF DENTAL IMPLANT(S)       HC TOOTH EXTRACTION W/FORCEP       TONSILLECTOMY         FH:   Family History   Problem Relation Age of Onset     Clotting Disorder Maternal Grandmother      Cancer Maternal Grandfather         Colon Ca     Thyroid Disease Mother         SH:   Social History     Tobacco Use     Smoking status: Never Smoker     Smokeless tobacco: Never Used   Substance Use Topics     Alcohol use: Yes     Comment: occ.     Drug use: Never      Lives in [place]. Grew up in [place]. Moved to [place] around end of June, partially as desire to start transitioning. Not currently working. Living with partner.    MEDS:     Current Outpatient Medications:      amphetamine-dextroamphetamine (ADDERALL) 10 MG tablet, Take 1 tablet (10 mg) by mouth 2 times daily, Disp: 60 tablet, Rfl: 0     amphetamine-dextroamphetamine (ADDERALL) 10 MG tablet, Take 1 tablet (10 mg) by mouth 2 times daily Twice daily as needed., Disp: 60 tablet, Rfl: 0     ibuprofen (ADVIL/MOTRIN) 200 MG capsule, Take 200 mg by mouth every 4 hours as needed for fever, Disp: , Rfl:      Needle, Disp, (BD DISP NEEDLE) 23G X 1\" MISC, 1 Units once a week, Disp: 50 each, Rfl: 1     Needle, Disp, (BD SAFETYGLIDE NEEDLE) 27G X 5/8\" MISC, 1 Units once a week, Disp: 50 each, Rfl: 1     SUMAtriptan (IMITREX) 25 MG tablet, Take 25 mg by mouth at onset of headache for migraine, Disp: , Rfl:      syringe, disposable, 1 ML MISC, 1 Units once a week, Disp: 60 each, Rfl: 1     testosterone cypionate (DEPOTESTOSTERONE) 100 MG/ML " injection, Inject 0.5 mLs (50 mg) into the muscle once a week, Disp: 10 mL, Rfl: 1     testosterone cypionate (DEPOTESTOSTERONE) 200 MG/ML injection, INJECT 0.25 MLS INTRAMUSCULARLY ONCE A WEEK. SINGLE USE VIAL; DISCARD REMAINING VOLUME., Disp: 10 mL, Rfl: 0       ALLERGIES:   No Known Allergies     FH: None.     ROS: Negative except for HPI     PHYSICAL EXAMINATION:   Deferred given virtual visit     ASSESSMENT: Gender dysphoria, requesting top surgery.     PLAN: The patient is a potential candidate for bilateral simple complete mastectomy with free nipple graft reconstruction as a form of chest gender confirmation surgery. Patient has not yet provided us with a letter of support.  We will review this.  I am requesting a baseline screening mammogram.    I explained this outpatient procedure in detail today.  I explained the risks to include bleeding, infection, injury to surrounding structures, fluid collection, nipple or nipple graft loss, nipple sensory loss, change in nipple size, wound healing difficulties, contour deformity, dog ears, asymmetry, and need for revision surgery.  Patient accepts these risks and wishes to proceed with surgery.       Total time spent with patient was 30 min of which greater than 50% was in counseling.    Zenobia Bailey MD  Plastic & Reconstructive Surgery  Pager: [phone]

## 2020-11-20 NOTE — NURSING NOTE
"Chief Complaint   Patient presents with     Consult     new top consult (mast); he/him       Vitals:    11/20/20 0835   Weight: 88 kg (194 lb)   Height: 1.626 m (5' 4\")       Body mass index is 33.3 kg/m .    Chandler Moralez, EMT    "

## 2020-11-20 NOTE — LETTER
"11/20/2020       RE: Elva Aaron  [address]  [place] [address]     Dear Colleague,    Thank you for referring your patient, Elva Aaron, to the [facility] PLASTIC AND RECONSTRU[facility] SURGERY CLIN[facility] [place] at [facility]. Please see a copy of my visit note below.    Elva Aaron is a 25 year old adult who is being evaluated via a billable video visit.      The patient has been notified of following:     \"This video visit will be conducted via a call between you and your physician/provider. We have found that certain health care needs can be provided without the need for an in-person physical exam.  This service lets us provide the care you need with a video conversation.  If a prescription is necessary we can send it directly to your pharmacy.  If lab work is needed we can place an order for that and you can then stop by our lab to have the test done at a later time.    Video visits are billed at different rates depending on your insurance coverage.  Please reach out to your insurance provider with any questions.    If during the course of the call the physician/provider feels a video visit is not appropriate, you will not be charged for this service.\"    Patient has given verbal consent for Video visit? Yes  How would you like to obtain your AVS? MyChart  If you are dropped from the video visit, the video invite should be resent to: Text to cell phone: [phone]  Will anyone else be joining your video visit? No      Video-Visit Details    Type of service:  Video Visit    Video Start Time: 840a  Video End Time: 9:04 AM    Originating Location (pt. Location): Home    Distant Location (provider location):  [facility]LAST[facility] REC[facility]ERY CLIN[facility] [place]     Platform used for Video Visit: Doximity      PLASTIC SURGERY HISTORY AND PHYSICAL    Chief Complaint: Gender dysphoria, requesting top surgery.     HPI: Patient is a 25 " "year old trans-person who prefers he/him pronouns, requesting top surgery.  Patient has been considering undergoing top surgery for the past 10 years.  By undergoing top surgery, the patient would like to achieve congruence between the  physical body with chosen gender identity.  The patient transitioned 7 years ago with friends but is not fully out to parents.  Reports supportive family and friends.  Chosen name is Elder.  The patient has been on testosterone therapy for the past 4 months.  Denies any previous breast history. Denies smoking. Patient reports maternal grandmother with history of blood clots.      PMH:   No past medical history on file.    PSH:   Past Surgical History:   Procedure Laterality Date     ADENOIDECTOMY       COSMETIC PLACEMENT OF DENTAL IMPLANT(S)       HC TOOTH EXTRACTION W/FORCEP       TONSILLECTOMY         FH:   Family History   Problem Relation Age of Onset     Clotting Disorder Maternal Grandmother      Cancer Maternal Grandfather         Colon Ca     Thyroid Disease Mother         SH:   Social History     Tobacco Use     Smoking status: Never Smoker     Smokeless tobacco: Never Used   Substance Use Topics     Alcohol use: Yes     Comment: occ.     Drug use: Never      Lives in [facility]. Grew up in [place]. Moved to [place] around end of June, partially as desire to start transitioning. Not currently working. Living with partner.    MEDS:     Current Outpatient Medications:      amphetamine-dextroamphetamine (ADDERALL) 10 MG tablet, Take 1 tablet (10 mg) by mouth 2 times daily, Disp: 60 tablet, Rfl: 0     amphetamine-dextroamphetamine (ADDERALL) 10 MG tablet, Take 1 tablet (10 mg) by mouth 2 times daily Twice daily as needed., Disp: 60 tablet, Rfl: 0     ibuprofen (ADVIL/MOTRIN) 200 MG capsule, Take 200 mg by mouth every 4 hours as needed for fever, Disp: , Rfl:      Needle, Disp, (BD DISP NEEDLE) 23G X 1\" MISC, 1 Units once a week, Disp: 50 each, Rfl: 1     Needle, Disp, (BD " "SAFETYGLIDE NEEDLE) 27G X 5/8\" MISC, 1 Units once a week, Disp: 50 each, Rfl: 1     SUMAtriptan (IMITREX) 25 MG tablet, Take 25 mg by mouth at onset of headache for migraine, Disp: , Rfl:      syringe, disposable, 1 ML MISC, 1 Units once a week, Disp: 60 each, Rfl: 1     testosterone cypionate (DEPOTESTOSTERONE) 100 MG/ML injection, Inject 0.5 mLs (50 mg) into the muscle once a week, Disp: 10 mL, Rfl: 1     testosterone cypionate (DEPOTESTOSTERONE) 200 MG/ML injection, INJECT 0.25 MLS INTRAMUSCULARLY ONCE A WEEK. SINGLE USE VIAL; DISCARD REMAINING VOLUME., Disp: 10 mL, Rfl: 0       ALLERGIES:   No Known Allergies     FH: None.     ROS: Negative except for HPI     PHYSICAL EXAMINATION:   Deferred given virtual visit     ASSESSMENT: Gender dysphoria, requesting top surgery.     PLAN: The patient is a potential candidate for bilateral simple complete mastectomy with free nipple graft reconstruction as a form of chest gender confirmation surgery. Patient has not yet provided us with a letter of support.  We will review this.  I am requesting a baseline screening mammogram.    I explained this outpatient procedure in detail today.  I explained the risks to include bleeding, infection, injury to surrounding structures, fluid collection, nipple or nipple graft loss, nipple sensory loss, change in nipple size, wound healing difficulties, contour deformity, dog ears, asymmetry, and need for revision surgery.  Patient accepts these risks and wishes to proceed with surgery.       Total time spent with patient was 30 min of which greater than 50% was in counseling.    Zenobia Bailey MD  Plastic & Reconstructive Surgery  Pager: [phone]        "

## 2020-11-30 ENCOUNTER — OFFICE VISIT (OUTPATIENT)
Dept: FAMILY MEDICINE | Facility: CLINIC | Age: 25
End: 2020-11-30
Payer: OTHER GOVERNMENT

## 2020-11-30 ENCOUNTER — TELEPHONE (OUTPATIENT)
Dept: PSYCHOLOGY | Facility: CLINIC | Age: 25
End: 2020-11-30

## 2020-11-30 VITALS
BODY MASS INDEX: 32.68 KG/M2 | HEART RATE: 95 BPM | RESPIRATION RATE: 16 BRPM | OXYGEN SATURATION: 99 % | DIASTOLIC BLOOD PRESSURE: 85 MMHG | SYSTOLIC BLOOD PRESSURE: 139 MMHG | TEMPERATURE: 98.4 F | WEIGHT: 190.4 LBS

## 2020-11-30 DIAGNOSIS — F90.8 ATTENTION DEFICIT HYPERACTIVITY DISORDER (ADHD), OTHER TYPE: ICD-10-CM

## 2020-11-30 DIAGNOSIS — F64.9 GENDER DYSPHORIA: Primary | ICD-10-CM

## 2020-11-30 DIAGNOSIS — Z23 NEED FOR PROPHYLACTIC VACCINATION AND INOCULATION AGAINST INFLUENZA: ICD-10-CM

## 2020-11-30 PROCEDURE — 99214 OFFICE O/P EST MOD 30 MIN: CPT | Mod: 25 | Performed by: STUDENT IN AN ORGANIZED HEALTH CARE EDUCATION/TRAINING PROGRAM

## 2020-11-30 PROCEDURE — 90686 IIV4 VACC NO PRSV 0.5 ML IM: CPT | Performed by: STUDENT IN AN ORGANIZED HEALTH CARE EDUCATION/TRAINING PROGRAM

## 2020-11-30 PROCEDURE — 90471 IMMUNIZATION ADMIN: CPT | Performed by: STUDENT IN AN ORGANIZED HEALTH CARE EDUCATION/TRAINING PROGRAM

## 2020-11-30 RX ORDER — DEXTROAMPHETAMINE SACCHARATE, AMPHETAMINE ASPARTATE MONOHYDRATE, DEXTROAMPHETAMINE SULFATE AND AMPHETAMINE SULFATE 2.5; 2.5; 2.5; 2.5 MG/1; MG/1; MG/1; MG/1
10 CAPSULE, EXTENDED RELEASE ORAL DAILY
Qty: 30 CAPSULE | Refills: 0 | Status: SHIPPED | OUTPATIENT
Start: 2020-11-30 | End: 2021-11-12

## 2020-11-30 NOTE — PATIENT INSTRUCTIONS
Here is the plan from today's visit:    1. Midcycle labs Friday - make a lab only appointment  2. New adderall xr script today - if side effects concerning make a follow-up appt (video / okay)  3. Referrals placed for psychology, gender support clinic for consideration of hysterectomy/oophrectomy phalloplasty.   4. Dosage adjustment of testosterone based on blood work results. Will contact via phone with results.   5. Phone number for mammogram below.    Best,   Stefanie Rueda,         Thank you for coming to [facility] today.  Lab Testing:  **If you had lab testing today and your results are reassuring or normal they will be mailed to you or sent through [employer] within 7 days.   **If the lab tests need quick action we will call you with the results.  The phone number we will call with results is # [phone] (home) . If this is not the best number please call our clinic and change the number.  Medication Refills:  If you need any refills please call your pharmacy and they will contact us.   If you need to  your refill at a new pharmacy, please contact the new pharmacy directly. The new pharmacy will help you get your medications transferred faster.   Scheduling:  If you have any concerns about today's visit or wish to schedule another appointment please call our office during normal business hours [phone] (8-5:00 M-F)  If a referral was made to a [facility] and you don't get a call from central scheduling please call [phone].  If a Mammogram was ordered for you at [facility] call [phone] to schedule or change your appointment.  If you had an XRay/CT/Ultrasound/MRI ordered the number is [phone] to schedule or change your radiology appointment.   Medical Concerns:  If you have urgent medical concerns please call [phone] at any time of the day.    Stefanie Rueda DO

## 2020-11-30 NOTE — TELEPHONE ENCOUNTER
Mental Health Referral:  Please schedule with Dr. Field      Please let patient know this is for primary care behavioral health services.  Therapists at our clinic are able to see patients for 8-12 sessions (video/phone). If further assistance is needed, they will help the patient connect with ongoing services in the community.    Check with the patient if they are able to do a video visit.  They will need access to either a smartphone or a laptop with camera/microphone.  If they can do a video visit, please schedule as a video visit.  If they do not have the technology to do a video visit, please schedule as a telephone visit. For either visit, please put the phone number or email in the appt notes that the provider is supposed to call or send the visit invite.        If you are unable to reach the patient after two phone attempts, please send a letter and close the encounter.      Thank you!

## 2020-11-30 NOTE — PROGRESS NOTES
HPI     Elder is a 25 year old individual that uses pronouns He/Him/His/Himself that presents today for follow up of:  masculinizing hormone therapy.     On hormones? testosterone 50mg subcutaneous weekly  YES +++ Shot day of the week? Tuesday      Due for labs?  Yes      +++ Refills of meds needed?  Yes    Had consult with surgeon for chest surgery- needs mammogram and letter of support for raiza.  Got a request for a mamogram - wasn't told how to make an appointment for that mammogram  - would like direction how to schedule  Wants to get internal referral to behavioral for letter of support for top surgery as has been unable to find therapist since transferring care.    Tried initiating care with psychotherapist - used list of providers from 7/28 visit. Has either not been called back or providers have not accepted  insurance. Psychotherapy cost-prohibitive if insurance not accepted. Feels that MDD, ARIK currently controlled. ADHD wise needs mediation refill.    For ADHD - not currently working - but does art and projects during the day - looses focus or motivation on those project when doesn't take meds.   Typically takes adderal IR one time a day around noon/1pm. Tries not to take past 2pm otherwise will stay up late in night.  Notes symptoms prior to taking it - or if doesn't take.   Started taking meds diaz year of high school school improved after taking med.    Has a lot more cramping - last menstrual cycle old tissue spread out over a month  - not sure what's up. Started Oct 24-25 lasted through nov. Has the nexplanon. This is the only time this happened since nexplanon placed. Sexual active no sperm near reproductive organs.   Would like hysterectomy and oophrectomy as well. Wants to pursue phalloplasty as well.   Wondering if can go up on dosing.       ---    Past Surgical History:   Procedure Laterality Date     ADENOIDECTOMY       COSMETIC PLACEMENT OF DENTAL IMPLANT(S)       HC TOOTH  "EXTRACTION W/FORCEP       TONSILLECTOMY         Patient Active Problem List   Diagnosis     ADHD (attention deficit hyperactivity disorder)     Anxiety     Major depressive disorder, recurrent, in full remission (H)     Gender dysphoria     Migraine with aura and without status migrainosus, not intractable     Acid reflux       Current Outpatient Medications   Medication Sig Dispense Refill     amphetamine-dextroamphetamine (ADDERALL) 10 MG tablet Take 1 tablet (10 mg) by mouth 2 times daily 60 tablet 0     amphetamine-dextroamphetamine (ADDERALL) 10 MG tablet Take 1 tablet (10 mg) by mouth 2 times daily Twice daily as needed. 60 tablet 0     ibuprofen (ADVIL/MOTRIN) 200 MG capsule Take 200 mg by mouth every 4 hours as needed for fever       Needle, Disp, (BD DISP NEEDLE) 23G X 1\" MISC 1 Units once a week 50 each 1     Needle, Disp, (BD SAFETYGLIDE NEEDLE) 27G X 5/8\" MISC 1 Units once a week 50 each 1     SUMAtriptan (IMITREX) 25 MG tablet Take 25 mg by mouth at onset of headache for migraine       syringe, disposable, 1 ML MISC 1 Units once a week 60 each 1     testosterone cypionate (DEPOTESTOSTERONE) 100 MG/ML injection Inject 0.5 mLs (50 mg) into the muscle once a week 10 mL 1     testosterone cypionate (DEPOTESTOSTERONE) 200 MG/ML injection INJECT 0.25 MLS INTRAMUSCULARLY ONCE A WEEK. SINGLE USE VIAL; DISCARD REMAINING VOLUME. 10 mL 0       History   Smoking Status     Never Smoker   Smokeless Tobacco     Never Used        No Known Allergies    Problem, Medication and Allergy Lists were reviewed and are current..         Review of Systems:        General    Fat redistribution: no    Weight change: More activ HEENT    Voice change: YES     Cardiovascular (CV)    Chest Pains: no    Shortness of breath: no Chest    Decreased exercise tolerance:  no    Breast changes/development: YES     Gastrointestinal (GI)    Abdominal pain: no    Change in appetite: YES -increase if not enough protein feels sluggish " Skin    Acne or oily skin: YES    Change in hair: facial hair - arm hair and thigh hair is increase     Genitourinary ()    Abnormal vaginal bleeding: YES     Decreased spontaneous erections: not applicable    Change in libido: YES - incease - if orgasm painful cramping - not excrutiating but harsh period - no dischage no concerns for vaginal infections    New sexual partners: no Musculoskeletal    Leg pain or swelling: no     Psychiatric (Psych)    Depression: no    Anxiety/Panic: no    Mood:  neutral                    Physical Exam:   There were no vitals filed for this visit.  BMI= There is no height or weight on file to calculate BMI.   Wt Readings from Last 10 Encounters:   11/20/20 88 kg (194 lb)   09/17/20 88.5 kg (195 lb)   08/25/20 87.8 kg (193 lb 9.6 oz)   08/24/20 87.1 kg (192 lb)   07/28/20 88.5 kg (195 lb)   07/21/20 88.6 kg (195 lb 6.4 oz)       GENERAL:: healthy, alert and no distress  EYES: Eyes grossly normal to inspection, sclera anicteric  RESP: lungs clear to auscultation - no rales, no rhonchi, no wheezes  CV: regular rates and rhythm, normal S1 S2,  and no murmur, no click or rub   ABDOMEN: soft, no tenderness, no  hepatosplenomegaly, no masses,  MS: extremities normal- no gross deformities noted, no edema at LE BL  SKIN: no suspicious lesions, no rashes at exposed skin  Psych: Alert and oriented times 3; coherent speech, normal rate and volume, able to articulate logical thoughts, able to abstract reason, no tangential thoughts, no hallucinations or delusions. Affect is normal           Labs:    Results from the last 24 hoursNo results found for this or any previous visit (from the past 24 hour(s)).    Assessment and Plan     Elder is a 25 year old transmale who presents in follow-up after initiation of masculinizing hormonal therapy. Currently tolerating testosterone 50mg subcutaneous weekly without overt side effects. Plan for screening Hgb, lipids, cmp, total testosterone level. Goal total  testosterone 300-1000. Will consider dosage adjustments (increase to testosterone cyprionate 75mg q week vs continuing 50mg q week) pending labs. Referral placed for comprehensive gender care team in support of hysterectomy oophrectomy and phalloplasty referrals. MDD and ARIK well controlled, as unable to establish care thus far since moving discussed case with Dr. Field in clinic regarding internal referral to behavioral health. Referral placed. Request for ADHD med refill, reviewed prior evaluation and current symptoms will dose adjust to adderall XR 10mg po daily. Recommended return to care if not tolerating med. Otherwise return to care in 3 months for lab follow-up.    Results by dylant  Questions were elicited and answered.     Stefanie Rueda DO  [facility], PGY-1

## 2020-12-02 ENCOUNTER — PATIENT OUTREACH (OUTPATIENT)
Dept: PLASTIC SURGERY | Facility: CLINIC | Age: 25
End: 2020-12-02

## 2020-12-02 NOTE — PROGRESS NOTES
Called pt after top consult with Dr. Bailey. Pt has current mental health provider who will provide letter of support.     Pt plan on calling to get an appointment for the mammogram.     Pt had no questions.     Once we receive letter and mammogram results we will be ready to place surgery orders.     Juaquin Tyler, [facility]  Transgender Care Coordinator

## 2020-12-04 DIAGNOSIS — F64.9 GENDER DYSPHORIA: ICD-10-CM

## 2020-12-04 LAB
ALBUMIN SERPL-MCNC: 4.2 MG/DL (ref 3.8–5)
ALP SERPL-CCNC: 64.2 U/L (ref 31.7–110.5)
ALT SERPL-CCNC: 12.9 U/L (ref 0–45)
AST SERPL-CCNC: 13.1 U/L (ref 0–45)
BILIRUB SERPL-MCNC: <0.4 MG/DL (ref 0.2–1.3)
BUN SERPL-MCNC: 8.1 MG/DL (ref 7–19)
CALCIUM SERPL-MCNC: 9.4 MG/DL (ref 8.5–10.1)
CHLORIDE SERPLBLD-SCNC: 99.9 MMOL/L (ref 98–110)
CHOLEST SERPL-MCNC: 113.3 MG/DL (ref 0–200)
CHOLEST/HDLC SERPL: 3.5 {RATIO} (ref 0–5)
CO2 SERPL-SCNC: 21.8 MMOL/L (ref 20–32)
CREAT SERPL-MCNC: 0.7 MG/DL (ref 0.5–1)
GFR SERPL CREATININE-BSD FRML MDRD: >90 ML/MIN/1.7 M2
GLUCOSE SERPL-MCNC: 89 MG'DL (ref 70–99)
HDLC SERPL-MCNC: 32.4 MG/DL
HEMOGLOBIN: 12.7 G/DL (ref 11.7–15.7)
LDLC SERPL CALC-MCNC: 68 MG/DL (ref 0–129)
POTASSIUM SERPL-SCNC: 3.7 MMOL/L (ref 3.3–4.5)
PROT SERPL-MCNC: 7.1 G/DL (ref 6.8–8.8)
SODIUM SERPL-SCNC: 132.9 MMOL/L (ref 132.6–141.4)
TRIGL SERPL-MCNC: 63.4 MG/DL (ref 0–150)
VLDL CHOLESTEROL: 12.7 MG/DL (ref 7–32)

## 2020-12-04 PROCEDURE — 36415 COLL VENOUS BLD VENIPUNCTURE: CPT

## 2020-12-04 PROCEDURE — 99000 SPECIMEN HANDLING OFFICE-LAB: CPT | Performed by: FAMILY MEDICINE

## 2020-12-04 PROCEDURE — 85018 HEMOGLOBIN: CPT

## 2020-12-04 PROCEDURE — 84403 ASSAY OF TOTAL TESTOSTERONE: CPT | Performed by: FAMILY MEDICINE

## 2020-12-04 PROCEDURE — 80061 LIPID PANEL: CPT

## 2020-12-04 PROCEDURE — 80053 COMPREHEN METABOLIC PANEL: CPT

## 2020-12-08 DIAGNOSIS — F64.9 GENDER DYSPHORIA: ICD-10-CM

## 2020-12-08 LAB — TESTOST SERPL-MCNC: 332 NG/DL (ref 8–60)

## 2020-12-09 DIAGNOSIS — F64.9 GENDER DYSPHORIA: ICD-10-CM

## 2020-12-09 RX ORDER — TESTOSTERONE CYPIONATE 1000 MG/10ML
50 INJECTION, SOLUTION INTRAMUSCULAR WEEKLY
Qty: 10 ML | Refills: 1 | Status: CANCELLED | OUTPATIENT
Start: 2020-12-09

## 2020-12-09 NOTE — PROGRESS NOTES
Preceptor Attestation:   Patient seen, evaluated and discussed with the resident. I have verified the content of the note, which accurately reflects my assessment of the patient and the plan of care.   Supervising Physician:  Yue Jean Baptiste, DO

## 2020-12-09 NOTE — TELEPHONE ENCOUNTER
"Request for medication refill:Testosterone    Providers if patient needs an appointment and you are willing to give a one month supply please refill for one month and  send a letter/MyChart using \".SMILLIMITEDREFILL\" .smillimited and route chart to \"P SMI \" (Giving one month refill in non controlled medications is strongly recommended before denial)    If refill has been denied, meaning absolutely no refills without visit, please complete the smart phrase \".smirxrefuse\" and route it to the \"P SMI MED REFILLS\"  pool to inform the patient and the pharmacy.    Billie Garcia, CMA        "

## 2020-12-11 ENCOUNTER — TELEPHONE (OUTPATIENT)
Dept: PLASTIC SURGERY | Facility: CLINIC | Age: 25
End: 2020-12-11

## 2020-12-11 DIAGNOSIS — F64.0 GENDER DYSPHORIA IN ADOLESCENT AND ADULT: Primary | ICD-10-CM

## 2020-12-11 NOTE — TELEPHONE ENCOUNTER
[facility]:  Care Coordination Note     SITUATION   Patient (Elder, he/him) is a 25 year old who is receiving support for:  Care Team (referral follow up)  .    BACKGROUND     Pt referred by Dr. Rueda at [facility] for hysterectomy, oophorectomy, phalloplasty. Called pt to discuss referral to [facility], completed intake for phalloplasty. Discussed 2 LOS requirement, pt has one therapist, writer to send list of therapist referrals via mail. Writer to send referral to [facility] for hysterectomy/oophorectomy consult.     ASSESSMENT     Surgery              [facility] Assessment  [facility] [place]ment: Enrolled  Patient has a therapist: Yes  Letter of support #1: Requested  Letter of support #2: Requested  Surgery being considered: Yes  Mastectomy: Yes  Phalloplasty: Yes    Pt does not smoke, no diabetes. HRT use for 4 months. In the process of scheduling top surgery with Dr. Bailey. Working with therapist to get 1 LOS for top surgery, will work on 2 LOS for phalloplasty. Writer to send therapist referrals via mail.         PLAN          Nursing Interventions:      Follow-up plan:    1. Writer to send referral to [facility] for hysterectomy/oophorectomy    2. Writer to mail therapist referrals to pt    3. Obtain 1 LOS for top surgery    4. Obtain 2 LOS for phalloplasty    5. Schedule phalloplasty consultation       Elma Tinsley

## 2020-12-15 ENCOUNTER — MYC REFILL (OUTPATIENT)
Dept: ORTHOPEDICS | Facility: CLINIC | Age: 25
End: 2020-12-15

## 2020-12-15 DIAGNOSIS — F64.9 GENDER DYSPHORIA: ICD-10-CM

## 2020-12-15 NOTE — TELEPHONE ENCOUNTER
Testosterone 200mg/mL last filled 10/01/2020 for #10mL     Thank you,    Miguelina Huber, PharmD  [facility]  [phone]

## 2020-12-16 RX ORDER — TESTOSTERONE CYPIONATE 200 MG/ML
60 INJECTION, SOLUTION INTRAMUSCULAR WEEKLY
Qty: 4 ML | Refills: 3 | Status: CANCELLED | OUTPATIENT
Start: 2020-12-16

## 2020-12-16 RX ORDER — TESTOSTERONE CYPIONATE 200 MG/ML
60 INJECTION, SOLUTION INTRAMUSCULAR WEEKLY
Qty: 10 ML | Refills: 0 | Status: CANCELLED | OUTPATIENT
Start: 2020-12-16

## 2020-12-16 RX ORDER — TESTOSTERONE CYPIONATE 1000 MG/10ML
60 INJECTION, SOLUTION INTRAMUSCULAR WEEKLY
Qty: 4 ML | Refills: 3 | Status: SHIPPED | OUTPATIENT
Start: 2020-12-16 | End: 2021-03-15

## 2020-12-16 NOTE — TELEPHONE ENCOUNTER

## 2020-12-16 NOTE — TELEPHONE ENCOUNTER
Encounter was made on 12/09/2020 refill request for the same medication.    Will reroute to PCP.     Nichole Gacria MA

## 2020-12-22 ENCOUNTER — VIRTUAL VISIT (OUTPATIENT)
Dept: PSYCHOLOGY | Facility: CLINIC | Age: 25
End: 2020-12-22
Payer: OTHER GOVERNMENT

## 2020-12-22 DIAGNOSIS — F41.9 ANXIETY: ICD-10-CM

## 2020-12-22 DIAGNOSIS — F64.9 GENDER DYSPHORIA: ICD-10-CM

## 2020-12-22 DIAGNOSIS — F33.42 MAJOR DEPRESSIVE DISORDER, RECURRENT, IN FULL REMISSION (H): ICD-10-CM

## 2020-12-22 DIAGNOSIS — F90.0 ATTENTION DEFICIT HYPERACTIVITY DISORDER (ADHD), PREDOMINANTLY INATTENTIVE TYPE: Primary | ICD-10-CM

## 2020-12-22 PROCEDURE — 90837 PSYTX W PT 60 MINUTES: CPT | Mod: 95 | Performed by: PSYCHOLOGIST
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
## 2021-01-04 ENCOUNTER — HEALTH MAINTENANCE LETTER (OUTPATIENT)
Age: 26
End: 2021-01-04

## 2021-01-04 ENCOUNTER — MYC REFILL (OUTPATIENT)
Dept: FAMILY MEDICINE | Facility: CLINIC | Age: 26
End: 2021-01-04

## 2021-01-04 DIAGNOSIS — F90.8 ATTENTION DEFICIT HYPERACTIVITY DISORDER (ADHD), OTHER TYPE: ICD-10-CM

## 2021-01-04 NOTE — TELEPHONE ENCOUNTER
"Patient requesting refill of Adderall 10 mg capsules. Last office visit 11/30/20 with Dr. Rueda. RN unable to fill as it is a controlled substance. Routing to PCP to approve if appropriate.   Elsie Fuentes RN      Request for medication refill:    Providers if patient needs an appointment and you are willing to give a one month supply please refill for one month and  send a letter/MyChart using \".SMILLIMITEDREFILL\" .smillimited and route chart to \"P SMI \" (Giving one month refill in non controlled medications is strongly recommended before denial)    If refill has been denied, meaning absolutely no refills without visit, please complete the smart phrase \".smirxrefuse\" and route it to the \"P SMI MED REFILLS\"  pool to inform the patient and the pharmacy.          "

## 2021-01-05 RX ORDER — DEXTROAMPHETAMINE SACCHARATE, AMPHETAMINE ASPARTATE MONOHYDRATE, DEXTROAMPHETAMINE SULFATE AND AMPHETAMINE SULFATE 2.5; 2.5; 2.5; 2.5 MG/1; MG/1; MG/1; MG/1
10 CAPSULE, EXTENDED RELEASE ORAL DAILY
Qty: 30 CAPSULE | Refills: 0 | Status: SHIPPED | OUTPATIENT
Start: 2021-01-05 | End: 2021-02-14

## 2021-01-05 RX ORDER — DEXTROAMPHETAMINE SACCHARATE, AMPHETAMINE ASPARTATE MONOHYDRATE, DEXTROAMPHETAMINE SULFATE AND AMPHETAMINE SULFATE 2.5; 2.5; 2.5; 2.5 MG/1; MG/1; MG/1; MG/1
10 CAPSULE, EXTENDED RELEASE ORAL DAILY
Qty: 30 CAPSULE | Refills: 0 | Status: SHIPPED | OUTPATIENT
Start: 2021-02-05 | End: 2021-03-07

## 2021-01-05 RX ORDER — DEXTROAMPHETAMINE SACCHARATE, AMPHETAMINE ASPARTATE MONOHYDRATE, DEXTROAMPHETAMINE SULFATE AND AMPHETAMINE SULFATE 2.5; 2.5; 2.5; 2.5 MG/1; MG/1; MG/1; MG/1
10 CAPSULE, EXTENDED RELEASE ORAL DAILY
Qty: 30 CAPSULE | Refills: 3 | Status: CANCELLED | OUTPATIENT
Start: 2021-01-05

## 2021-01-05 NOTE — TELEPHONE ENCOUNTER
Hi there,    Want to approve this request and give 3 months of refills. Let me know if any questions. Thanks!  Stefanie

## 2021-01-05 NOTE — TELEPHONE ENCOUNTER
Elder was seen today for refill request.    Diagnoses and all orders for this visit:    Attention deficit hyperactivity disorder (ADHD), other type  -     amphetamine-dextroamphetamine (ADDERALL XR) 10 MG 24 hr capsule; Take 1 capsule (10 mg) by mouth daily  -     amphetamine-dextroamphetamine (ADDERALL XR) 10 MG 24 hr capsule; Take 1 capsule (10 mg) by mouth daily    Last seen 11/30. Will give 2 months refills as patient is due for follow up q 3 months for controlled substances. (Due 3/1)    Kai York, DO

## 2021-01-14 ENCOUNTER — VIRTUAL VISIT (OUTPATIENT)
Dept: PSYCHOLOGY | Facility: CLINIC | Age: 26
End: 2021-01-14
Payer: OTHER GOVERNMENT

## 2021-01-14 DIAGNOSIS — F90.0 ATTENTION DEFICIT HYPERACTIVITY DISORDER (ADHD), PREDOMINANTLY INATTENTIVE TYPE: Primary | ICD-10-CM

## 2021-01-14 DIAGNOSIS — F64.9 GENDER DYSPHORIA: ICD-10-CM

## 2021-01-14 DIAGNOSIS — F41.9 ANXIETY: ICD-10-CM

## 2021-01-14 DIAGNOSIS — F33.42 MAJOR DEPRESSIVE DISORDER, RECURRENT, IN FULL REMISSION (H): ICD-10-CM

## 2021-01-14 PROCEDURE — 90837 PSYTX W PT 60 MINUTES: CPT | Mod: 95 | Performed by: PSYCHOLOGIST
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
## 2021-02-04 ENCOUNTER — VIRTUAL VISIT (OUTPATIENT)
Dept: PSYCHOLOGY | Facility: CLINIC | Age: 26
End: 2021-02-04
Payer: OTHER GOVERNMENT

## 2021-02-04 DIAGNOSIS — F64.9 GENDER DYSPHORIA: ICD-10-CM

## 2021-02-04 DIAGNOSIS — F41.9 ANXIETY: ICD-10-CM

## 2021-02-04 DIAGNOSIS — F33.42 MAJOR DEPRESSIVE DISORDER, RECURRENT, IN FULL REMISSION (H): ICD-10-CM

## 2021-02-04 DIAGNOSIS — F90.0 ATTENTION DEFICIT HYPERACTIVITY DISORDER (ADHD), PREDOMINANTLY INATTENTIVE TYPE: Primary | ICD-10-CM

## 2021-02-04 PROCEDURE — 90832 PSYTX W PT 30 MINUTES: CPT | Mod: 95 | Performed by: PSYCHOLOGIST

## 2021-02-04 NOTE — PROGRESS NOTES
Behavioral Health Progress Note    Client Legal Name:  Elva Aaron   Client Preferred Name:  Elder   Service Type:  Individual  Length of Visit:  20 minutes (11 am - 11:20 pm via phone)  Attendees: patient    Attendees:  patient      Telemedicine Visit: The patient's condition can be safely assessed and treated via a telephone encounter.       Reason for Telemedicine Visit: Services only offered telehealth     Originating Site (Patient Location): Patient's home     Distant Site (Provider Location): [facility] Outpatient Setting: hospitals     Consent:  The patient/guardian has verbally consented to: the potential risks and benefits of telemedicine (video visit) versus in person care; bill my insurance or make self-payment for services provided; and responsibility for payment of non-covered services.      Mode of Communication:  telephone     As the provider I attest to compliance with applicable laws and regulations related to telemedicine.     Emergency contact: Chandler Benavides, [phone]  Closest Emergency Room:[facility]  Location at time of call: at home     Identifying Information and Presenting Problem:  Elder is a 25 year old American adult who was referred for therapy for depression and anxiety, as well as evaluation to potentially provide a letter of support for top surgery     Treatment Objective(s) Addressed in This Session:  N/a evaluation only at this time - will develop treatment goals at a future appt     Progress on / Status of Treatment Objective(s) / Homework:  N/a        PHQ-9 SCORE 8/24/2020   PHQ-9 Total Score 4     ARIK-7 SCORE 8/24/2020   Total Score 3       Topics Discussed/Interventions Provided:  Shared with Elder that based on his current mental health status it seems appropriate to write a letter of support for gender affirming surgery. Reviewed the contents of the letter with Johniain so he was aware of the content.  Asked if he had any questions about this letter and he stated that he did  not have any other questions at this time.  Provided information on the availability of the letter in the chart that his team will be able to access - I do not need to send it separately since it is the same health system.      Clarified with Elder expectations for ongoing therapy.  Elder stated that he would like to engage in ongoing therapy now that the evaluation is complete for additional support as he goes through the surgery and continues to navigate     Assessment:   The patient appeared to be active and engaged in today's session and was receptive to feedback.     Mental Status:   Elder was on time for his therapy session. Patient was oriented to all spheres.  He was cooperative and open throughout the session.  Speech was thoughtful, normal rate and rhythm.  Stated mood was good.  Affect was appropriate to stated mood and thought content. Attention span and concentration were intact. Memory and cognition were intact, no deficits noted. Thought processes were linear, logical, and goal-directed. Thought content was void of delusions or hallucinations. Patient did not report suicidal ideation, plan, or intent. Insight and judgment were good.      Does the patient appear to be at imminent risk of harm to self/others at this time?  No     The session was necessary to review letter of support for gender affirming surgery, as well as discussion regarding continuation of services following the evaluation.        DSM-5 Diagnosis:  ADHD  MDD, in full remission  Anxiety  Gender Dysphoria     Plan:  1. Follow up on      Pilar Field PsyD, LP      NOTE: Treatment plan update due after DA is complete.  Diagnostic assessment update due in 2-3 sessions.                                              [address] [place] [address],  [address]  [facility] [address]  [phone]         2021    Re: Legal name: Elva Aaron  Preferred name: Elder  : 1995    Behavioral health letter of  support for Transgender Mastectomy Surgery    Dr.Hsieh Elva Aaron, who goes by the name Elder has obtained behavioral health services at [facility] since July 2020.   Elder  is a 25 year old transgender person who initially sought services to for support with gender transition and making decisions about medical interventions to address their gender dysphoria. Their most recent appointment for individual psychotherapy occurred on February 4, 2021.    Elder completed a diagnostic assessment which included evaluating psychosocial adjustment, sexual health, and overall psychological functioning. His chart was also reviewed.    Elder has been diagnosed with gender dysphoria. He currently meets criteria for ADHD which is treated with Adderrall.  He has a previous diagnosis of Major Depressive disorder, but this is currently in full remission.   They have exhibited a persistent and long-standing gender and anatomic dysphoria related to their birth-assigned sex. He has experienced anxiety and depression for periods of time in adjustment to their process of disclosing their identity to their family and friends, but does not meet criteria for any anxiety or mood disorders at this time. They are well adjusted and thoughtful, and has approached their decision making for surgery with intention and care. Elder s mental health concerns are stable and well controlled, and do not pose any barrier to their ability to consent and undergo gender-affirming surgery at this time.    Elder has met all the recommended criteria for the proposed surgery.These criteria are consistent with the [employer] Transgender [employer] s Standards of Care for the Health of Transsexual, Transgender and Gender Nonconforming People, and support this procedure as medically necessary for Elder's overall health.    This surgery will very likely decrease their gender dysphoria and psychological distress and improve their  overall psychological functioning.  In some cases, denial of access to this surgery can lead to increased isolation, decreased work performance, and decreased sexual and interpersonal functioning. Surgery is expected to increase comfort with self, and improve interpersonal, sexual, and vocational functioning.    Elder has educated themselves about the surgery and is aware of its risks and benefits. This behavioral health recommendation is based on the psychological indication for surgery and did not include a physical examination to assess medical contra-indications for the surgical procedure. This letter of support is valid for one year from the date of approval.      If you have any questions, or are in need of additional information, please do not hesitate to contact me at [phone].    Sincerely,    Pilar Field PsyD, ROGER  Behavioral Health Clinician

## 2021-02-14 ENCOUNTER — MYC REFILL (OUTPATIENT)
Dept: FAMILY MEDICINE | Facility: CLINIC | Age: 26
End: 2021-02-14

## 2021-02-14 DIAGNOSIS — F90.8 ATTENTION DEFICIT HYPERACTIVITY DISORDER (ADHD), OTHER TYPE: ICD-10-CM

## 2021-02-15 NOTE — TELEPHONE ENCOUNTER

## 2021-02-19 RX ORDER — DEXTROAMPHETAMINE SACCHARATE, AMPHETAMINE ASPARTATE MONOHYDRATE, DEXTROAMPHETAMINE SULFATE AND AMPHETAMINE SULFATE 2.5; 2.5; 2.5; 2.5 MG/1; MG/1; MG/1; MG/1
10 CAPSULE, EXTENDED RELEASE ORAL DAILY
Qty: 15 CAPSULE | Refills: 0 | Status: SHIPPED | OUTPATIENT
Start: 2021-02-19 | End: 2021-03-06

## 2021-02-19 NOTE — TELEPHONE ENCOUNTER
Writing 15 day limited supply. Recommend a follow-up for ADHD for longer term supply video vs in person. Routing to advisor for controlled Rx sign. Routing to  to book appointment.

## 2021-02-24 ENCOUNTER — VIRTUAL VISIT (OUTPATIENT)
Dept: PSYCHOLOGY | Facility: CLINIC | Age: 26
End: 2021-02-24
Payer: OTHER GOVERNMENT

## 2021-02-24 DIAGNOSIS — F41.9 ANXIETY: ICD-10-CM

## 2021-02-24 DIAGNOSIS — F64.9 GENDER DYSPHORIA: ICD-10-CM

## 2021-02-24 DIAGNOSIS — F90.0 ATTENTION DEFICIT HYPERACTIVITY DISORDER (ADHD), PREDOMINANTLY INATTENTIVE TYPE: Primary | ICD-10-CM

## 2021-02-24 DIAGNOSIS — F33.42 MAJOR DEPRESSIVE DISORDER, RECURRENT, IN FULL REMISSION (H): ICD-10-CM

## 2021-02-24 PROCEDURE — 90834 PSYTX W PT 45 MINUTES: CPT | Mod: 95 | Performed by: PSYCHOLOGIST

## 2021-02-24 NOTE — PROGRESS NOTES
Behavioral Health Progress Note    Client Legal Name:  Elva Aaron   Client Preferred Name:  Elder   Service Type:  Individual  Length of Visit:  1:20 - 2 pm  Attendees: patient    Attendees:  patient      Telemedicine Visit: The patient's condition can be safely assessed and treated via a telephone encounter.       Reason for Telemedicine Visit: Services only offered telehealth     Originating Site (Patient Location): Patient's home     Distant Site (Provider Location): [facility] Outpatient Setting: [facility]     Consent:  The patient/guardian has verbally consented to: the potential risks and benefits of telemedicine (video visit) versus in person care; bill my insurance or make self-payment for services provided; and responsibility for payment of non-covered services.      Mode of Communication:  telephone     As the provider I attest to compliance with applicable laws and regulations related to telemedicine.     Emergency contact: Chandler Benavides, [phone]  Closest Emergency Room:[facility]  Location at time of call: at home     Identifying Information and Presenting Problem:  Elder is a 25 year old American adult who was referred for therapy to assist with relapse prevention of depression and anxiety, as well as support letter for gender surgery     Treatment Objective(s) Addressed in This Session:  N/a evaluation only at this time - will develop treatment goals at a future appt     Progress on / Status of Treatment Objective(s) / Homework:  Elder reports depression and anxiety have continued to be well controlled        PHQ-9 SCORE 8/24/2020   PHQ-9 Total Score 4     ARIK-7 SCORE 8/24/2020   Total Score 3       Topics Discussed/Interventions Provided:  Checked to see if Elder had any further questions/concerns in regard to the support level.  He stated he believed that the team had received the letter and that he did not have any other questions.  Elder would like to transition to therapy now to assist in  "maintaining all the gains he has made in terms of treating his depression/anxiety, as well as for support as he continues through this process.      Primary focus of today's session was on Elder's concerns regarding his lack of interest in intimate relations with his partner.  Elder states he has always had a low sex drive. Thought perhaps it would start to increase more once he started the testosterone, but hasn't noticed much change.  Some of the low desire is also due to dysphoria related to his body.  Feels like he is letting his partner down because he does not want to have sexual contact of any kind.  Feels pressure at times from his partner that he should be wanting this more often.  In describing his concerns it seemed as if Elder was feeling like he has to problem-solve this on his own to \"fix\" himself.  Provided some education on feelings of intimacy being separate and related to desire.  Explored is his partner would be willing to do any kind of couples counseling around this. Elder did not think that would be an option at this point.  Reviewed some activities Elder can do on his own and with his partner that may be helpful.  These are all listed in the after visit summary. Encouraged him to also talk to his physician to ensure that other physical causes are ruled out.      Assessment:   The patient appeared to be active and engaged in today's session and was receptive to feedback.     Mental Status:   Elder was on time for his therapy session. Patient was oriented to all spheres.  Speech was normal rate and rhythm.  Stated mood was pretty good.  Affect was appropriate to stated mood and thought content. Attention span and concentration were intact. Memory and cognition were intact, no deficits noted. Thought processes were linear, logical, and goal-directed. Thought content was void of delusions or hallucinations. Patient did not report suicidal ideation, plan, or intent. Insight and judgment were good.      Does " the patient appear to be at imminent risk of harm to self/others at this time?  No     The session was necessary to identify treatment goals for therapy, as well as addressing concerns about sexual desire.         DSM-5 Diagnosis:  ADHD  MDD, in full remission  Anxiety  Gender Dysphoria     Plan:  1. Follow up on 3/17     Pilar Field PsyD, ROGER      NOTE: Treatment plan update due after DA is complete.  Diagnostic assessment update due in 2-3 sessions.

## 2021-02-24 NOTE — PATIENT INSTRUCTIONS
Sexual Problems and Self-Help Interventions  Sexual problems occur for many people and result from both medical and nonmedical reasons. Sexual problems can include things such as reduced desire to have sex, difficulty feeling aroused, not being able to have or keep an erection or become lubricated, difficulty staying aroused, and/or difficulty having an orgasm.     Medications Can Cause Sexual Side Effects  Medications can affect desire, arousal, and orgasm. The following are some medications that can affect sexual functioning:    Antidepressants, mood stabilizers, tranquilizers, and other drugs given for anxiety    Oral contraceptives and hormonal therapies    Chemotherapy medications    Alcohol, narcotics, and other controlled substances    Some medications for treatment of allergies, hypertension, and glaucoma    Anticonvulsant medications      Medical Problems and Surgeries That Can Cause or Worsen Sexual Problems  Diabetes   Cardiovascular disease   Thyroid conditions    Emphysema   Sleep loss (i.e., insomnia)   Chronic pain   Recent surgery (e.g., mastectomy, hysterectomy, or removal of ovaries for females; prostatectomy, orchiectomy for men)     Cancer    Relationship Difficulties    Dissatisfaction, resentment, or struggles for power or control within the relationship    Poor communication    Having different value systems    Lack of intimacy, emotional expression, or physical affection    Discrepancies in sexual preferences    Personal and Psychological Factors    Fatigue.    Depression.    Anxiety and stress.    Age. As we get older, our sexual response slows down and we need more stimulation and time.      Performance anxiety (i.e. fears about sexual response or loss of control).    Negative beliefs about sex or certain sexual practices.    Low self-esteem and poor body image.    Narrow or unrealistic standards for sexual interactions.    Jh-Ks-Hhxwksrn Activities That Can Help Sexual  Problems  Alone:    Self-exploration and stimulation. This can help you increase awareness of your own body and make it easier to communicate likes and dislikes to your partner.    Changing negative thoughts and assumptions about sex with more positive and realistic thoughts about what feels good and right for you.    Challenging negative thoughts about your partner by focusing on what is attractive and positive about him or her.     Challenging negative thoughts about yourself by focusing on what is attractive and positive about you.    Physical exercise increases blood flow, reduces tension, enhances body image, and can improve other conditions that hinder sexual functioning.  With Your Partner:     Rebuild or establish emotional intimacy     Schedule time together when you simply talk to each other. Use the time to share feelings and get reacquainted with what is attractive and unique about your partner.    Share leisure activities.     Add small expressions of affection back into your daily routine if this is lacking (e.g.,  an affectionate note, phone call, or e-mail; hugs or hand-holding)    Increase communication    Discuss sexual interests, desires, needs, and difficulties when you are NOT engaged in sexual activity.     Talk about what is going well and what you would like to be different in the relationship overall, then work together to come up with doable solutions.     Add something new to sexual encounters (e.g., place, position, clothing, technique, erotica).    Allow more time for foreplay and provide more partner-guided stimulation.    During sexual encounters, focus on sensations rather than thoughts, performance, expectations, and appearances.     Behavioral Exercise  This exercise is designed to help you and your partner learn more about what types of stimulation you like. It also encourages physical intimacy and provides a way for you give as well as receive pleasure. It is not a prelude to sex  and does not include intercourse or orgasm, so there are no sexual performance demands.    Pick a time and place for you and your partner to be together. Allow at least 1 hour. The place should be private, comfortable, and free of distractions.    Both partners should, at most, wear comfortable, light underclothes, although you may find being nude more comfortable.     Without touching genitals, take turns giving and receiving stimulation (e.g., massaging, fondling, caressing). Take about half an hour per partner.     Each partner should focus on the sensations of touching and being touched.     The receiving partner should direct the giving partner by providing feedback about what is pleasurable or not or what could be done differently. The giving partner should adjust their stimulation accordingly. Use various strokes (e.g., long, short, soft, hard). Try using the palms, fingertips, and so forth.     Partners should do only what is comfortable for them, and let the other person know when something feels pleasurable or becomes uncomfortable.     Remember, this exercise is designed to increase intimacy and decrease performance expectation, pressure, and anxiety, so NO SEX!    LIS Zhou., SHIRA Epps., Teresa, M. S., & SHIKHA Ayon. (2009). Integrated Behavioral Mic in Primary Care: Step-by-Step Guidance for Assessment and Intervention. [employer].

## 2021-03-09 ENCOUNTER — ANCILLARY PROCEDURE (OUTPATIENT)
Dept: MAMMOGRAPHY | Facility: CLINIC | Age: 26
End: 2021-03-09
Attending: PLASTIC SURGERY
Payer: OTHER GOVERNMENT

## 2021-03-09 DIAGNOSIS — F64.0 GENDER DYSPHORIA IN ADOLESCENT AND ADULT: ICD-10-CM

## 2021-03-09 PROCEDURE — 77067 SCR MAMMO BI INCL CAD: CPT | Mod: 26 | Performed by: STUDENT IN AN ORGANIZED HEALTH CARE EDUCATION/TRAINING PROGRAM

## 2021-03-10 ENCOUNTER — PATIENT OUTREACH (OUTPATIENT)
Dept: PLASTIC SURGERY | Facility: CLINIC | Age: 26
End: 2021-03-10

## 2021-03-10 NOTE — PROGRESS NOTES
[facility] :  Care Coordination Note     SITUATION   Elder Aaron is a 25 year old adult who is receiving support for: Gender dysphoria.    BACKGROUND     Pt attended new [facility] consult with Dr. Bailey.    ASSESSMENT     Surgery              CGC Assessment  [place]ment: Enrolled  Patient has a therapist: Yes  Letter of support #1: Requested  Surgery being considered: Yes  Mastectomy: Yes  Mammogram completed: Yes(3/9/21 Negative)          PLAN          Nursing Interventions:  Mammogram completed 3/9/21 negative.     Follow-up plan:  Await LOSSukhdev Byrd RN

## 2021-03-15 ENCOUNTER — OFFICE VISIT (OUTPATIENT)
Dept: FAMILY MEDICINE | Facility: CLINIC | Age: 26
End: 2021-03-15
Payer: OTHER GOVERNMENT

## 2021-03-15 VITALS
TEMPERATURE: 98 F | HEIGHT: 65 IN | BODY MASS INDEX: 30.29 KG/M2 | HEART RATE: 94 BPM | WEIGHT: 181.8 LBS | DIASTOLIC BLOOD PRESSURE: 71 MMHG | SYSTOLIC BLOOD PRESSURE: 109 MMHG | RESPIRATION RATE: 16 BRPM | OXYGEN SATURATION: 98 %

## 2021-03-15 DIAGNOSIS — Z23 NEED FOR PROPHYLACTIC VACCINATION AGAINST HUMAN PAPILLOMAVIRUS: ICD-10-CM

## 2021-03-15 DIAGNOSIS — F64.9 GENDER DYSPHORIA: Primary | ICD-10-CM

## 2021-03-15 DIAGNOSIS — F90.8 ATTENTION DEFICIT HYPERACTIVITY DISORDER (ADHD), OTHER TYPE: ICD-10-CM

## 2021-03-15 PROCEDURE — 99214 OFFICE O/P EST MOD 30 MIN: CPT | Mod: 25 | Performed by: STUDENT IN AN ORGANIZED HEALTH CARE EDUCATION/TRAINING PROGRAM

## 2021-03-15 PROCEDURE — 90471 IMMUNIZATION ADMIN: CPT | Performed by: STUDENT IN AN ORGANIZED HEALTH CARE EDUCATION/TRAINING PROGRAM

## 2021-03-15 PROCEDURE — 90651 9VHPV VACCINE 2/3 DOSE IM: CPT | Performed by: STUDENT IN AN ORGANIZED HEALTH CARE EDUCATION/TRAINING PROGRAM

## 2021-03-15 RX ORDER — TESTOSTERONE CYPIONATE 1000 MG/10ML
60 INJECTION, SOLUTION INTRAMUSCULAR WEEKLY
Qty: 4 ML | Refills: 3 | Status: CANCELLED | OUTPATIENT
Start: 2021-03-15

## 2021-03-15 RX ORDER — DEXTROAMPHETAMINE SACCHARATE, AMPHETAMINE ASPARTATE MONOHYDRATE, DEXTROAMPHETAMINE SULFATE AND AMPHETAMINE SULFATE 2.5; 2.5; 2.5; 2.5 MG/1; MG/1; MG/1; MG/1
10 CAPSULE, EXTENDED RELEASE ORAL DAILY
Qty: 30 CAPSULE | Refills: 0 | Status: SHIPPED | OUTPATIENT
Start: 2021-05-16 | End: 2021-06-15

## 2021-03-15 RX ORDER — DEXTROAMPHETAMINE SACCHARATE, AMPHETAMINE ASPARTATE MONOHYDRATE, DEXTROAMPHETAMINE SULFATE AND AMPHETAMINE SULFATE 2.5; 2.5; 2.5; 2.5 MG/1; MG/1; MG/1; MG/1
10 CAPSULE, EXTENDED RELEASE ORAL DAILY
Qty: 30 CAPSULE | Refills: 5 | Status: CANCELLED | OUTPATIENT
Start: 2021-03-15

## 2021-03-15 RX ORDER — DEXTROAMPHETAMINE SACCHARATE, AMPHETAMINE ASPARTATE MONOHYDRATE, DEXTROAMPHETAMINE SULFATE AND AMPHETAMINE SULFATE 2.5; 2.5; 2.5; 2.5 MG/1; MG/1; MG/1; MG/1
10 CAPSULE, EXTENDED RELEASE ORAL DAILY
Qty: 30 CAPSULE | Refills: 0 | Status: SHIPPED | OUTPATIENT
Start: 2021-04-15 | End: 2021-05-15

## 2021-03-15 RX ORDER — TESTOSTERONE CYPIONATE 200 MG/ML
60 INJECTION, SOLUTION INTRAMUSCULAR WEEKLY
Qty: 4 ML | Refills: 3 | Status: SHIPPED | OUTPATIENT
Start: 2021-03-15 | End: 2021-03-23

## 2021-03-15 RX ORDER — DEXTROAMPHETAMINE SACCHARATE, AMPHETAMINE ASPARTATE MONOHYDRATE, DEXTROAMPHETAMINE SULFATE AND AMPHETAMINE SULFATE 2.5; 2.5; 2.5; 2.5 MG/1; MG/1; MG/1; MG/1
10 CAPSULE, EXTENDED RELEASE ORAL DAILY
Qty: 30 CAPSULE | Refills: 0 | Status: SHIPPED | OUTPATIENT
Start: 2021-03-15 | End: 2021-04-14

## 2021-03-15 ASSESSMENT — ANXIETY QUESTIONNAIRES
IF YOU CHECKED OFF ANY PROBLEMS ON THIS QUESTIONNAIRE, HOW DIFFICULT HAVE THESE PROBLEMS MADE IT FOR YOU TO DO YOUR WORK, TAKE CARE OF THINGS AT HOME, OR GET ALONG WITH OTHER PEOPLE: SOMEWHAT DIFFICULT
1. FEELING NERVOUS, ANXIOUS, OR ON EDGE: NOT AT ALL
2. NOT BEING ABLE TO STOP OR CONTROL WORRYING: NOT AT ALL
5. BEING SO RESTLESS THAT IT IS HARD TO SIT STILL: NOT AT ALL
7. FEELING AFRAID AS IF SOMETHING AWFUL MIGHT HAPPEN: NOT AT ALL
GAD7 TOTAL SCORE: 1
6. BECOMING EASILY ANNOYED OR IRRITABLE: SEVERAL DAYS
3. WORRYING TOO MUCH ABOUT DIFFERENT THINGS: NOT AT ALL

## 2021-03-15 ASSESSMENT — PATIENT HEALTH QUESTIONNAIRE - PHQ9: 5. POOR APPETITE OR OVEREATING: NOT AT ALL

## 2021-03-15 ASSESSMENT — MIFFLIN-ST. JEOR: SCORE: 1569.9

## 2021-03-15 NOTE — PROGRESS NOTES
Assessment & Plan     Gender dysphoria  26 yo transmale here for continued masculinizing hormone therapy.  Currently tolerating testosterone 60 mg subcutaneous weekly. Symptomatically not at goal as continues with some spotting /some bleeding every 4 weeks.  Plan for screening hemoglobin lipids CMP midcycle testosterone level.  Goal total testosterone 300-1,000. Will consider dosage adjustment increase to 80 mg weekly if lower range of goal.  Patient actively pursuing top surgery.  Also interested in hysterectomy.  Was previously unable to tolerate conscious pelvic exam in clinic.  Likely will require sedation to tolerate pelvic exam.  Placed referral for OB/GYN evaluation through gender care team.  Plan for follow-up in 3 to 4 months, sooner as needed.  We will discuss dosage changes via [facility] pending lab results.  Patient given summary of care and after visit summary.   - testosterone cypionate (DEPOTESTOSTERONE) 200 MG/ML injection  Dispense: 4 mL; Refill: 3  - Testosterone total  - Lipid Cascade ([facility])  - Comprehensive Metabolic Panel ([facility])  - Hemoglobin (HGB) ([facility])  - OB/GYN REFERRAL - INTERNAL  - COMPREHENSIVE GENDER CARE REFERRAL - INTERNAL  - syringe, disposable, 1 ML MISC  Dispense: 60 each; Refill: 1    Attention deficit hyperactivity disorder (ADHD), other type  History of ADHD, improvement in attention and focus on extended release Adderall.  Will continue extended release dosing at 10 mg.  Recommended follow-up in approximately 3 months via [facility] for script renewal or in person in concert with Gender followup. Contact clinic sooner if any concerns.  - amphetamine-dextroamphetamine (ADDERALL XR) 10 MG 24 hr capsule  Dispense: 30 capsule; Refill: 0  - amphetamine-dextroamphetamine (ADDERALL XR) 10 MG 24 hr capsule  Dispense: 30 capsule; Refill: 0  - amphetamine-dextroamphetamine (ADDERALL XR) 10 MG 24 hr capsule  Dispense: 30 capsule; Refill: 0    Need for prophylactic vaccination  against human papillomavirus   Needs HPV vaccine today.      Return in about 3 months (around 6/15/2021), or if symptoms worsen or fail to improve.    Stefanie Rueda DO  [facility]    Cinda Aaron is a 25 year old who presents to clinic today for the following health issues:    Gender care f/u  Continues on testosterone Tuesdays shot days. Dosing 0.3ML not 100% sure but thinks 200mg/1ml concentration. Needs some syringes. Has enough needles. Noted voice changes, increased body hair on thighs. Acne. Improved mood since starting hormones in July.   Had mammogram, results normal. Got letter of support for top surgery. Now Awaiting surgical plan.  Next step hysterectomy. Would like uterus out. Uterine bleeding has been on/off. Hasn't stopped entirely. Has slowed down to a point where spotting and cramping. Not having sex where sperm is near organs. Does get pelvic cramping seems to come on when cycle would start. Intense cramps when orgasms. Not sustained, goes away after a few minutes. Seems to feel in lower abdomen. No other     ADHD Follow-Up (Adult)  Concerns: just needs a med refill  Changes since last visit: Improving  Taking controlled (daily) medications as prescribed: Yes  Sleep: twitching legs that wondering if has caused restless. Sleeping well otherwise.   Adult ADHD Self-Reporting form given to patient?:  No  Currently in counseling: Yes    Medication Benefits:   Controlled symptoms: Distractability and Finishing tasks  Uncontrolled symptoms:  None    Medication Side Effects:  Reports:  none  Sleep Problems? no  ++++++++++++++++++++++++++++++++++++++++++++++++    Employer Concerns/Feedback: N/A.  Coworker Concerns:   N/a.  Home/Family Concerns: Improving        Adherence and Exercise  Medication side effects: no  How often is a medication missed? Never  Exercise:walking  2-3 days/week for an average of 15-30 minutes      Focus and attention has been good on sustained  "release. Appetite changes worse with IR. On XR keeps focus but not to degree where only focusing (the hyper focus) on one task which is a significant improvement. Takes med once in the morning when awakens.     In therapy with Dr. Field 1xmonth. That's a good frequency. Stopped taking antidepressants around August 2020. No SI. Noticeable improvement in mood since starting testosterone. GAD7 -1 PHQ9 - 3       Review of Systems   Constitutional, HEENT, cardiovascular, pulmonary, gi and gu systems are negative, except as otherwise noted.      Objective    /71   Pulse 94   Temp 98  F (36.7  C) (Oral)   Resp 16   Ht 1.65 m (5' 4.96\")   Wt 82.5 kg (181 lb 12.8 oz)   SpO2 98%   BMI 30.29 kg/m    Body mass index is 30.29 kg/m .  Physical Exam  Vitals signs (13lb weight loss over last 8 months) reviewed.   Constitutional:       General: He is not in acute distress.     Appearance: Normal appearance.   HENT:      Head: Normocephalic.      Right Ear: External ear normal.      Left Ear: External ear normal.   Eyes:      General: No scleral icterus.     Extraocular Movements: Extraocular movements intact.   Neck:      Musculoskeletal: Normal range of motion and neck supple.   Cardiovascular:      Rate and Rhythm: Normal rate.   Pulmonary:      Effort: Pulmonary effort is normal. No respiratory distress.   Musculoskeletal:      Right lower leg: No edema.      Left lower leg: No edema.   Lymphadenopathy:      Cervical: No cervical adenopathy.   Skin:     General: Skin is warm and dry.   Neurological:      General: No focal deficit present.      Mental Status: He is alert.   Psychiatric:         Mood and Affect: Mood normal.         Behavior: Behavior normal.         Thought Content: Thought content normal.         Judgment: Judgment normal.                "

## 2021-03-15 NOTE — PATIENT INSTRUCTIONS
Here is the plan from today's visit  Make a lab only visit for any Friday for midweek screening labs  Pickup scripts for adderall and testosterone  I will contact you via [facility] regarding dose adjustments  Placed a referral for OBGYN evaluation for consideration of sedated pelvic exam & hysterectomy planning    Best,   Dr. Rueda        Thank you for coming to [facility] today.  Lab Testing:  **If you had lab testing today and your results are reassuring or normal they will be mailed to you or sent through [facility] within 7 days.   **If the lab tests need quick action we will call you with the results.  The phone number we will call with results is # [phone] (home) . If this is not the best number please call our clinic and change the number.  Medication Refills:  If you need any refills please call your pharmacy and they will contact us.   If you need to  your refill at a new pharmacy, please contact the new pharmacy directly. The new pharmacy will help you get your medications transferred faster.   Scheduling:  If you have any concerns about today's visit or wish to schedule another appointment please call our office during normal business hours [phone] (8-5:00 M-F)  If a referral was made to a [facility] and you don't get a call from central scheduling please call [phone].  If a Mammogram was ordered for you at [facility] call [phone] to schedule or change your appointment.  If you had an XRay/CT/Ultrasound/MRI ordered the number is [phone] to schedule or change your radiology appointment.   Medical Concerns:  If you have urgent medical concerns please call [phone] at any time of the day.    Stefanie Rueda, DO

## 2021-03-17 ENCOUNTER — VIRTUAL VISIT (OUTPATIENT)
Dept: PSYCHOLOGY | Facility: CLINIC | Age: 26
End: 2021-03-17
Payer: OTHER GOVERNMENT

## 2021-03-17 DIAGNOSIS — F41.9 ANXIETY: ICD-10-CM

## 2021-03-17 DIAGNOSIS — F64.9 GENDER DYSPHORIA: ICD-10-CM

## 2021-03-17 DIAGNOSIS — F33.42 MAJOR DEPRESSIVE DISORDER, RECURRENT, IN FULL REMISSION (H): Primary | ICD-10-CM

## 2021-03-17 PROCEDURE — 90832 PSYTX W PT 30 MINUTES: CPT | Mod: 95 | Performed by: PSYCHOLOGIST

## 2021-03-17 NOTE — PROGRESS NOTES
"  Behavioral Health Progress Note    Client Legal Name:  Elva Aaron   Client Preferred Name:  Elder   Service Type:  Individual  Length of Visit:  1:21 - 1:50 pm (29 minutes)  Attendees: patient    Attendees:  patient      Telemedicine Visit: The patient's condition can be safely assessed and treated via a telephone encounter.       Reason for Telemedicine Visit: Services only offered telehealth     Originating Site (Patient Location): Patient's home     Distant Site (Provider Location): [facility] Outpatient Setting: \[facility] [place] [facility]\""     Consent:  The patient/guardian has verbally consented to: the potential risks and benefits of telemedicine (video visit) versus in person care; bill my insurance or make self-payment for services provided; and responsibility for payment of non-covered services.      Mode of Communication:  telephone     As the provider I attest to compliance with applicable laws and regulations related to telemedicine.     Emergency contact: Chandler Benavides, [phone]  Closest Emergency Room: [facility]  Location at time of call: at home     Identifying Information and Presenting Problem:  Elder is a 25 year old American adult who was referred for therapy to assist with relapse prevention of depression and anxiety, as well as support letter for gender surgery.      Treatment Objective(s) Addressed in This Session:  N/a evaluation only at this time - will develop treatment goals at a future appt     Progress on / Status of Treatment Objective(s) / Homework:  Elder reports depression and anxiety have continued to be well controlled        PHQ-9 SCORE 8/24/2020   PHQ-9 Total Score 4     ARIK-7 SCORE 8/24/2020   Total Score 3       Topics Discussed/Interventions Provided:  Elder reports feeling relief about progress made toward surgery - completed mammogram.  It wasn't pleasant, but wasn't as bad as had anticipated. Felt good that he took the step and got it done. Reports intimate relations are \"a little bit " "better.\"  Noticing a slight increase in desire.  Negative thoughts toward himself about this difficulty have decreased.  Challenge/reframe thoughts as needed.  Processed reaction toward current stressors. Feeling anxious, but anxiety is not feeling as heavy. Anxiety and mood symptoms continue to feel well-controlled.  No concerns.      Assessment:   The patient appeared to be active and engaged in today's session and was receptive to feedback.     Mental Status:   Zed was on time for his therapy session. Patient was oriented to all spheres.  Speech was normal rate and rhythm.  Stated mood was slightly anxious.  Affect was appropriate to stated mood and thought content. Attention span and concentration were intact. Memory and cognition were intact, no deficits noted. Thought processes were linear, logical, and goal-directed. Thought content was void of delusions or hallucinations. Patient did not report suicidal ideation, plan, or intent. Insight and judgment were good.      Does the patient appear to be at imminent risk of harm to self/others at this time?  No     The session was necessary to assess current mood and anxiety symptoms which continue to be well controlled.  Followed up on concerns regarding level of desire for intimacy.  Feels that this is improving slightly.     DSM-5 Diagnosis:  ADHD  MDD, in full remission  Anxiety  Gender Dysphoria     Plan:  1. Follow up on 4/14  2. Patient can call clinic if anything is needed before that time.      Pilar Field PsyD, LP      NOTE: Treatment plan update due after DA is complete.  Diagnostic assessment update due in 2-3 sessions.      "

## 2021-03-19 DIAGNOSIS — F64.9 GENDER DYSPHORIA: ICD-10-CM

## 2021-03-19 LAB
ALBUMIN SERPL-MCNC: 4.1 MG/DL (ref 3.8–5)
ALP SERPL-CCNC: 74.4 U/L (ref 31.7–110.5)
ALT SERPL-CCNC: 18.5 U/L (ref 0–45)
AST SERPL-CCNC: 23.4 U/L (ref 0–45)
BILIRUB SERPL-MCNC: <0.4 MG/DL (ref 0.2–1.3)
BUN SERPL-MCNC: 5.2 MG/DL (ref 7–19)
CALCIUM SERPL-MCNC: 9 MG/DL (ref 8.5–10.1)
CHLORIDE SERPLBLD-SCNC: 100.6 MMOL/L (ref 98–110)
CHOLEST SERPL-MCNC: 122.2 MG/DL (ref 0–200)
CHOLEST/HDLC SERPL: 3.5 {RATIO} (ref 0–5)
CO2 SERPL-SCNC: 27.8 MMOL/L (ref 20–32)
CREAT SERPL-MCNC: 0.8 MG/DL (ref 0.5–1)
GFR SERPL CREATININE-BSD FRML MDRD: >90 ML/MIN/1.7 M2
GLUCOSE SERPL-MCNC: 93.7 MG'DL (ref 70–99)
HDLC SERPL-MCNC: 34.5 MG/DL
HEMOGLOBIN: 14.3 G/DL (ref 11.7–15.7)
LDLC SERPL CALC-MCNC: 71 MG/DL (ref 0–129)
POTASSIUM SERPL-SCNC: 3.9 MMOL/L (ref 3.3–4.5)
PROT SERPL-MCNC: 6.8 G/DL (ref 6.8–8.8)
SODIUM SERPL-SCNC: 137.4 MMOL/L (ref 132.6–141.4)
TRIGL SERPL-MCNC: 81.2 MG/DL (ref 0–150)
VLDL CHOLESTEROL: 16.2 MG/DL (ref 7–32)

## 2021-03-19 PROCEDURE — 84403 ASSAY OF TOTAL TESTOSTERONE: CPT | Performed by: FAMILY MEDICINE

## 2021-03-19 PROCEDURE — 80061 LIPID PANEL: CPT

## 2021-03-19 PROCEDURE — 80053 COMPREHEN METABOLIC PANEL: CPT

## 2021-03-19 PROCEDURE — 85018 HEMOGLOBIN: CPT

## 2021-03-19 PROCEDURE — 36415 COLL VENOUS BLD VENIPUNCTURE: CPT

## 2021-03-23 ENCOUNTER — TELEPHONE (OUTPATIENT)
Dept: PLASTIC SURGERY | Facility: CLINIC | Age: 26
End: 2021-03-23

## 2021-03-23 LAB — TESTOST SERPL-MCNC: 322 NG/DL (ref 8–60)

## 2021-03-23 RX ORDER — TESTOSTERONE CYPIONATE 200 MG/ML
80 INJECTION, SOLUTION INTRAMUSCULAR WEEKLY
Qty: 4 ML | Refills: 3
Start: 2021-03-23 | End: 2021-08-01

## 2021-03-23 NOTE — TELEPHONE ENCOUNTER
Called pt regarding referral for hysterectomy from Dr. York at [facility]. No answer, [facility]VM with [facility] [place] information.     Elma Tinsley

## 2021-03-29 ASSESSMENT — PATIENT HEALTH QUESTIONNAIRE - PHQ9: SUM OF ALL RESPONSES TO PHQ QUESTIONS 1-9: 3

## 2021-03-30 ASSESSMENT — ANXIETY QUESTIONNAIRES: GAD7 TOTAL SCORE: 1

## 2021-04-20 ENCOUNTER — MYC REFILL (OUTPATIENT)
Dept: FAMILY MEDICINE | Facility: CLINIC | Age: 26
End: 2021-04-20

## 2021-04-20 DIAGNOSIS — F90.8 ATTENTION DEFICIT HYPERACTIVITY DISORDER (ADHD), OTHER TYPE: ICD-10-CM

## 2021-04-20 RX ORDER — DEXTROAMPHETAMINE SACCHARATE, AMPHETAMINE ASPARTATE MONOHYDRATE, DEXTROAMPHETAMINE SULFATE AND AMPHETAMINE SULFATE 2.5; 2.5; 2.5; 2.5 MG/1; MG/1; MG/1; MG/1
10 CAPSULE, EXTENDED RELEASE ORAL DAILY
Qty: 30 CAPSULE | Refills: 0 | Status: CANCELLED | OUTPATIENT
Start: 2021-04-20

## 2021-04-20 NOTE — TELEPHONE ENCOUNTER
"Patient requesting refill of adderall xr 10 mg capsule via mychart. Last office visit 3/15/21 with Dr. Rueda. RN unable to fill as it is a controlled substance. Routing to PCP to order if appropriate.   Elsie Fuentes RN      Request for medication refill:    Providers if patient needs an appointment and you are willing to give a one month supply please refill for one month and  send a letter/MyChart using \".SMILLIMITEDREFILL\" .smillimited and route chart to \"P SMI \" (Giving one month refill in non controlled medications is strongly recommended before denial)    If refill has been denied, meaning absolutely no refills without visit, please complete the smart phrase \".smirxrefuse\" and route it to the \"P SMI MED REFILLS\"  pool to inform the patient and the pharmacy.          "

## 2021-04-21 NOTE — TELEPHONE ENCOUNTER
RN sent mychart to patient relaying message from Dr. Rueda and directing them to contact pharmacy for refill.   Elsie Fuentes RN

## 2021-04-21 NOTE — TELEPHONE ENCOUNTER
Pt has existing active script for adderall xr 10mg daily listed as 4/15-5/15 and 5/15-6/15. Will not place new order. Please let pt know they can contact pharmacy to fill April script.

## 2021-04-27 ENCOUNTER — IMMUNIZATION (OUTPATIENT)
Dept: NURSING | Facility: CLINIC | Age: 26
End: 2021-04-27

## 2021-04-27 PROCEDURE — 91300 PR COVID VAC PFIZER DIL RECON 30 MCG/0.3 ML IM: CPT

## 2021-04-27 PROCEDURE — 0001A PR COVID VAC PFIZER DIL RECON 30 MCG/0.3 ML IM: CPT

## 2021-05-18 ENCOUNTER — IMMUNIZATION (OUTPATIENT)
Dept: NURSING | Facility: CLINIC | Age: 26
End: 2021-05-18
Attending: INTERNAL MEDICINE
Payer: OTHER GOVERNMENT

## 2021-05-18 PROCEDURE — 91300 PR COVID VAC PFIZER DIL RECON 30 MCG/0.3 ML IM: CPT

## 2021-05-18 PROCEDURE — 0002A PR COVID VAC PFIZER DIL RECON 30 MCG/0.3 ML IM: CPT

## 2021-06-04 ENCOUNTER — PATIENT OUTREACH (OUTPATIENT)
Dept: PLASTIC SURGERY | Facility: CLINIC | Age: 26
End: 2021-06-04

## 2021-06-04 ENCOUNTER — PREP FOR PROCEDURE (OUTPATIENT)
Dept: PLASTIC SURGERY | Facility: CLINIC | Age: 26
End: 2021-06-04

## 2021-06-04 DIAGNOSIS — F64.0 GENDER DYSPHORIA IN ADOLESCENT AND ADULT: Primary | ICD-10-CM

## 2021-06-04 RX ORDER — CEFAZOLIN SODIUM 2 G/50ML
2 SOLUTION INTRAVENOUS
Status: CANCELLED | OUTPATIENT
Start: 2021-06-04

## 2021-06-04 RX ORDER — CEFAZOLIN SODIUM 2 G/50ML
2 SOLUTION INTRAVENOUS SEE ADMIN INSTRUCTIONS
Status: CANCELLED | OUTPATIENT
Start: 2021-06-04

## 2021-06-04 NOTE — PROGRESS NOTES
[facility] :  Care Coordination Note     SITUATION   Elder Aaron is a 25 year old adult who is receiving support for: Gender Dysphoria.    BACKGROUND     Pt attended new top consult with Dr. Bailey.     ASSESSMENT     Surgery              CGC Assessment  [place] [place]  Patient has a therapist: Yes  Name of therapist: Pilar Field PsyD, LP  Letter of support #1: Received  Letter #1 Date: 02/04/21  Surgery being considered: Yes  Mastectomy: Yes  Mammogram completed: Yes 3/9/21, Negative          PLAN          Nursing Interventions: LOS received and reviewed, adequate for PA. Mammogram completed 3/9/21, negative.      Follow-up plan:  Ready for case request, scheduling, and PA.        Kamala Byrd RN

## 2021-07-04 ENCOUNTER — MYC REFILL (OUTPATIENT)
Dept: FAMILY MEDICINE | Facility: CLINIC | Age: 26
End: 2021-07-04

## 2021-07-04 DIAGNOSIS — F90.8 ATTENTION DEFICIT HYPERACTIVITY DISORDER (ADHD), OTHER TYPE: ICD-10-CM

## 2021-07-04 RX ORDER — DEXTROAMPHETAMINE SACCHARATE, AMPHETAMINE ASPARTATE MONOHYDRATE, DEXTROAMPHETAMINE SULFATE AND AMPHETAMINE SULFATE 2.5; 2.5; 2.5; 2.5 MG/1; MG/1; MG/1; MG/1
10 CAPSULE, EXTENDED RELEASE ORAL DAILY
Qty: 30 CAPSULE | Refills: 0 | Status: CANCELLED | OUTPATIENT
Start: 2021-07-04

## 2021-07-05 NOTE — TELEPHONE ENCOUNTER
"Patient requesting refill of Adderall XR 10 mg capsule via mychart. Last office visit 3/15/21 with Dr. Rueda. Routing to PCP to fill if appropriate.   Elsie Fuentes RN      Request for medication refill:    Providers if patient needs an appointment and you are willing to give a one month supply please refill for one month and  send a letter/MyChart using \".SMILLIMITEDREFILL\" .smillimited and route chart to \"P SMI \" (Giving one month refill in non controlled medications is strongly recommended before denial)    If refill has been denied, meaning absolutely no refills without visit, please complete the smart phrase \".smirxrefuse\" and route it to the \"P SMI MED REFILLS\"  pool to inform the patient and the pharmacy.        "

## 2021-07-06 RX ORDER — DEXTROAMPHETAMINE SACCHARATE, AMPHETAMINE ASPARTATE, DEXTROAMPHETAMINE SULFATE AND AMPHETAMINE SULFATE 2.5; 2.5; 2.5; 2.5 MG/1; MG/1; MG/1; MG/1
10 TABLET ORAL DAILY
Qty: 30 TABLET | Refills: 0 | Status: SHIPPED | OUTPATIENT
Start: 2021-07-06 | End: 2021-08-05

## 2021-07-06 RX ORDER — DEXTROAMPHETAMINE SACCHARATE, AMPHETAMINE ASPARTATE, DEXTROAMPHETAMINE SULFATE AND AMPHETAMINE SULFATE 2.5; 2.5; 2.5; 2.5 MG/1; MG/1; MG/1; MG/1
10 TABLET ORAL DAILY
Qty: 30 TABLET | Refills: 0 | Status: SHIPPED | OUTPATIENT
Start: 2021-09-06 | End: 2021-08-09

## 2021-07-06 RX ORDER — DEXTROAMPHETAMINE SACCHARATE, AMPHETAMINE ASPARTATE, DEXTROAMPHETAMINE SULFATE AND AMPHETAMINE SULFATE 2.5; 2.5; 2.5; 2.5 MG/1; MG/1; MG/1; MG/1
10 TABLET ORAL DAILY
Qty: 30 TABLET | Refills: 0 | Status: SHIPPED | OUTPATIENT
Start: 2021-08-06 | End: 2021-08-09

## 2021-07-06 NOTE — TELEPHONE ENCOUNTER
RN sending to preceptor to sign per Dr. Rueda.   Elsie Fuentes, RN        Stefanie Rueda,   [facility] Triage Nurse 35 minutes ago (9:41 AM)     Pended 3 month supply of refills. Patient will need a follow-up visit in September 2021 to renew script. Could this please be forwarded to preceptor of the day (as it's a controlled substance?) Thank you!

## 2021-07-30 DIAGNOSIS — F64.9 GENDER DYSPHORIA: ICD-10-CM

## 2021-07-30 NOTE — TELEPHONE ENCOUNTER
Testosterone 200mg/ml    LAST FILLED DATE: 06-  LAST FILLED STRENGTH: 200 MG  LAST FILLED QTY: 4  LAST OFFICE VISIT: 03-15-21    Shante APARICIO CP @    [facility]  [address] [place] [address]  Phone: [phone]  Fax: [phone]

## 2021-08-02 RX ORDER — TESTOSTERONE CYPIONATE 200 MG/ML
80 INJECTION, SOLUTION INTRAMUSCULAR WEEKLY
Qty: 4 ML | Refills: 3 | Status: SHIPPED | OUTPATIENT
Start: 2021-08-02 | End: 2021-11-12

## 2021-08-09 ENCOUNTER — OFFICE VISIT (OUTPATIENT)
Dept: FAMILY MEDICINE | Facility: CLINIC | Age: 26
End: 2021-08-09
Payer: OTHER GOVERNMENT

## 2021-08-09 VITALS
RESPIRATION RATE: 16 BRPM | WEIGHT: 166.4 LBS | BODY MASS INDEX: 27.72 KG/M2 | HEART RATE: 101 BPM | TEMPERATURE: 98.7 F | SYSTOLIC BLOOD PRESSURE: 119 MMHG | DIASTOLIC BLOOD PRESSURE: 84 MMHG | OXYGEN SATURATION: 96 %

## 2021-08-09 DIAGNOSIS — F64.9 GENDER DYSPHORIA: ICD-10-CM

## 2021-08-09 DIAGNOSIS — F64.0 GENDER DYSPHORIA IN ADOLESCENT AND ADULT: Primary | ICD-10-CM

## 2021-08-09 DIAGNOSIS — F90.8 ATTENTION DEFICIT HYPERACTIVITY DISORDER (ADHD), OTHER TYPE: Primary | ICD-10-CM

## 2021-08-09 PROCEDURE — 99214 OFFICE O/P EST MOD 30 MIN: CPT | Mod: GC | Performed by: STUDENT IN AN ORGANIZED HEALTH CARE EDUCATION/TRAINING PROGRAM

## 2021-08-09 RX ORDER — DEXTROAMPHETAMINE SACCHARATE, AMPHETAMINE ASPARTATE MONOHYDRATE, DEXTROAMPHETAMINE SULFATE AND AMPHETAMINE SULFATE 2.5; 2.5; 2.5; 2.5 MG/1; MG/1; MG/1; MG/1
10 CAPSULE, EXTENDED RELEASE ORAL DAILY
Qty: 30 CAPSULE | Refills: 0 | Status: SHIPPED | OUTPATIENT
Start: 2021-09-09 | End: 2021-10-09

## 2021-08-09 RX ORDER — CEFAZOLIN SODIUM 2 G/50ML
2 SOLUTION INTRAVENOUS SEE ADMIN INSTRUCTIONS
Status: CANCELLED | OUTPATIENT
Start: 2021-08-09

## 2021-08-09 RX ORDER — DEXTROAMPHETAMINE SACCHARATE, AMPHETAMINE ASPARTATE MONOHYDRATE, DEXTROAMPHETAMINE SULFATE AND AMPHETAMINE SULFATE 2.5; 2.5; 2.5; 2.5 MG/1; MG/1; MG/1; MG/1
10 CAPSULE, EXTENDED RELEASE ORAL DAILY
Qty: 30 CAPSULE | Refills: 0 | Status: SHIPPED | OUTPATIENT
Start: 2021-10-10 | End: 2021-11-09

## 2021-08-09 RX ORDER — CEFAZOLIN SODIUM 2 G/50ML
2 SOLUTION INTRAVENOUS
Status: CANCELLED | OUTPATIENT
Start: 2021-08-09

## 2021-08-09 RX ORDER — DEXTROAMPHETAMINE SACCHARATE, AMPHETAMINE ASPARTATE MONOHYDRATE, DEXTROAMPHETAMINE SULFATE AND AMPHETAMINE SULFATE 2.5; 2.5; 2.5; 2.5 MG/1; MG/1; MG/1; MG/1
10 CAPSULE, EXTENDED RELEASE ORAL DAILY
Qty: 30 CAPSULE | Refills: 0 | Status: SHIPPED | OUTPATIENT
Start: 2021-08-09 | End: 2021-09-08

## 2021-08-09 NOTE — PROGRESS NOTES
Preceptor Attestation:   Patient seen and discussed with the resident. Assessment and plan reviewed with resident and agreed upon.   Supervising Physician:  DO [facility]

## 2021-08-09 NOTE — PROGRESS NOTES
Assessment & Plan     Attention deficit hyperactivity disorder (ADHD), other type  History of ADHD, continued improvement in attention and focus on extended release Adderall.  Will continue extended release dosing at 10 mg.  Recommended follow-up in approximately 3 months via [facility] for script renewal or in person in concert with Gender followup. Contact clinic sooner if any concerns.  - amphetamine-dextroamphetamine (ADDERALL XR) 10 MG 24 hr capsule; Take 1 capsule (10 mg) by mouth daily  - amphetamine-dextroamphetamine (ADDERALL XR) 10 MG 24 hr capsule; Take 1 capsule (10 mg) by mouth daily  - amphetamine-dextroamphetamine (ADDERALL XR) 10 MG 24 hr capsule; Take 1 capsule (10 mg) by mouth daily    Gender dysphoria  24 yo transmale here for continued masculinizing hormone therapy.  Currently tolerating testosterone 80 mg subcutaneous weekly. Happy with physical changes and period suppression on continued dose. Due for screening hemoglobin lipids CMP midcycle testosterone level.  Goal total testosterone 300-1,000.  Patient actively pursuing top surgery.  Also interested in hysterectomy.  Placed referral for OB/GYN eval and consideration surgery.  Plan for follow-up in 3 to 4 months, sooner as needed.  We will discuss dosage changes via [facility] pending lab results.  Patient given summary of care and after visit summary.   - OB/GYN REFERRAL - INTERNAL  - Hemoglobin; Future  - Lipid panel reflex to direct LDL Non-fasting; Future  - Glucose; Future  - Hepatic panel; Future  - Testosterone total; Future      Return in about 3 months (around 11/9/2021), or if symptoms worsen or fail to improve.    Stefanie Rueda DO  [facility] TERRYMERLINE Friedman is a 25 year old who presents for the following health issues     HPI     HRT followup  Growing more facial hair - more acne in the back simialr to acne in puberty no cystic acne - usings a natural unscnetn soap with salicylic acid that has been  controling breakouts off face. Washing the masks too to make tsure the skin is     Cycle is suppressed some occasional cramping     Leg hair filling out as well     Losing weight at a healthy rate , walking developing muscle tone in calves with taking more walks the backs of hands sineyw as well - the tendons seem to stick out less knee pain from the Libido changes increased (not on ssri and testosterone) and mood has been so much better since off selective serotonin reuptake inhibitor. Wonders if bc in open environment doesn't have to worry about judgement in current environment - mood has been on inclinde no prolonged depressive episodes.     Sleep 8hrs reliably - falls asleep in eaerly am and will sleep 10hrs    Eating - good increased appetite - hungry more frequently brain fog w/o animal protein - seems more balanced out on adhd meds - regularly gets hungry and feels need to eat throughout the day    Has lost 30 pounds in last year - regular better meals walking and feeling better overall      Adderall - continues to be of benefit for daytime activities. Sleep as described above. Mood has greatly improved over the last several months.   GAD7 - 0  PHQ9         Objective    /84   Pulse 101   Temp 98.7  F (37.1  C) (Oral)   Resp 16   Wt 75.5 kg (166 lb 6.4 oz)   SpO2 96%   BMI 27.72 kg/m    Body mass index is 27.72 kg/m .  Physical Exam  Vitals reviewed.   Constitutional:       General: He is not in acute distress.     Appearance: Normal appearance. He is not ill-appearing.   HENT:      Head: Normocephalic and atraumatic.      Right Ear: External ear normal.      Left Ear: External ear normal.   Eyes:      General: No scleral icterus.     Extraocular Movements: Extraocular movements intact.      Conjunctiva/sclera: Conjunctivae normal.   Cardiovascular:      Rate and Rhythm: Normal rate.   Pulmonary:      Effort: Pulmonary effort is normal. No respiratory distress.   Musculoskeletal:         General:  Normal range of motion.      Cervical back: Normal range of motion.   Neurological:      General: No focal deficit present.      Mental Status: He is alert. Mental status is at baseline.   Psychiatric:      Comments: Psych: Pt appeared generally alert and oriented. Dress was casual and appropriate to the weather and occasion. Grooming and hygiene were good. Eye contact was good. Speech was of normal volume and rate and was clear, coherent, and relevant. Mood was happy with reflective affect. Thought processes were relevant, logical and goal-directed. Thought content was WNL with no evidence of psychotic or paranoid features. Denied any SI/HI or self-harm, intent, or plans. Memory appeared grossly intact. Insight and judgment appeared good and patient exhibited good impulse control during the appointment.

## 2021-08-20 ENCOUNTER — LAB (OUTPATIENT)
Dept: LAB | Facility: CLINIC | Age: 26
End: 2021-08-20
Payer: OTHER GOVERNMENT

## 2021-08-20 ENCOUNTER — TELEPHONE (OUTPATIENT)
Dept: PLASTIC SURGERY | Facility: CLINIC | Age: 26
End: 2021-08-20

## 2021-08-20 DIAGNOSIS — F64.9 GENDER DYSPHORIA: ICD-10-CM

## 2021-08-20 LAB
ALBUMIN SERPL-MCNC: 3.5 G/DL (ref 3.4–5)
ALP SERPL-CCNC: 84 U/L (ref 40–150)
ALT SERPL W P-5'-P-CCNC: 24 U/L (ref 0–70)
AST SERPL W P-5'-P-CCNC: 20 U/L (ref 0–45)
BILIRUB DIRECT SERPL-MCNC: 0.1 MG/DL (ref 0–0.2)
BILIRUB SERPL-MCNC: 0.4 MG/DL (ref 0.2–1.3)
CHOLEST SERPL-MCNC: 115 MG/DL
FASTING STATUS PATIENT QL REPORTED: NO
FASTING STATUS PATIENT QL REPORTED: NO
GLUCOSE BLD-MCNC: 84 MG/DL (ref 70–99)
HDLC SERPL-MCNC: 35 MG/DL
HGB BLD-MCNC: 14.3 G/DL
LDLC SERPL CALC-MCNC: 70 MG/DL
NONHDLC SERPL-MCNC: 80 MG/DL
PROT SERPL-MCNC: 7.4 G/DL (ref 6.8–8.8)
TRIGL SERPL-MCNC: 50 MG/DL

## 2021-08-20 PROCEDURE — 36415 COLL VENOUS BLD VENIPUNCTURE: CPT

## 2021-08-20 PROCEDURE — 84403 ASSAY OF TOTAL TESTOSTERONE: CPT

## 2021-08-20 PROCEDURE — 80061 LIPID PANEL: CPT

## 2021-08-20 PROCEDURE — 82947 ASSAY GLUCOSE BLOOD QUANT: CPT

## 2021-08-20 PROCEDURE — 85018 HEMOGLOBIN: CPT

## 2021-08-20 PROCEDURE — 80076 HEPATIC FUNCTION PANEL: CPT

## 2021-08-20 NOTE — TELEPHONE ENCOUNTER
Writer called pt regarding scheduling with Rai for top surgery consult. Pt is waiting to schedule surgery with Isaak. No answer, LVM.     Elma Tinsley

## 2021-08-24 LAB — TESTOST SERPL-MCNC: 573 NG/DL (ref 8–950)

## 2021-08-24 NOTE — TELEPHONE ENCOUNTER
FUTURE VISIT INFORMATION      FUTURE VISIT INFORMATION:    Date: 9/8/21    Time: 12:00pm    [place] [facility] [place] INFORMATION:    Referring provider:  self    Referring providers clinic:  N/A    Reason for visit/diagnosis  mastectomy    RECORDS REQUESTED FROM:       No recs to collect

## 2021-08-25 NOTE — TELEPHONE ENCOUNTER
FUTURE VISIT INFORMATION      FUTURE VISIT INFORMATION:    Date: 9/15/21    Time: 3:45pm    [place] [facility] [place] INFORMATION:    Referring provider:  self    Referring providers clinic:  N/A    Reason for visit/diagnosis  mastectomy     RECORDS REQUESTED FROM:         No recs to collect

## 2021-08-27 ASSESSMENT — ANXIETY QUESTIONNAIRES
2. NOT BEING ABLE TO STOP OR CONTROL WORRYING: NOT AT ALL
GAD7 TOTAL SCORE: 0
3. WORRYING TOO MUCH ABOUT DIFFERENT THINGS: NOT AT ALL
1. FEELING NERVOUS, ANXIOUS, OR ON EDGE: NOT AT ALL
7. FEELING AFRAID AS IF SOMETHING AWFUL MIGHT HAPPEN: NOT AT ALL
5. BEING SO RESTLESS THAT IT IS HARD TO SIT STILL: NOT AT ALL
6. BECOMING EASILY ANNOYED OR IRRITABLE: NOT AT ALL
IF YOU CHECKED OFF ANY PROBLEMS ON THIS QUESTIONNAIRE, HOW DIFFICULT HAVE THESE PROBLEMS MADE IT FOR YOU TO DO YOUR WORK, TAKE CARE OF THINGS AT HOME, OR GET ALONG WITH OTHER PEOPLE: NOT DIFFICULT AT ALL

## 2021-08-27 ASSESSMENT — PATIENT HEALTH QUESTIONNAIRE - PHQ9
5. POOR APPETITE OR OVEREATING: NOT AT ALL
SUM OF ALL RESPONSES TO PHQ QUESTIONS 1-9: 3

## 2021-08-28 ASSESSMENT — ANXIETY QUESTIONNAIRES: GAD7 TOTAL SCORE: 0

## 2021-09-08 ENCOUNTER — PRE VISIT (OUTPATIENT)
Dept: PLASTIC SURGERY | Facility: CLINIC | Age: 26
End: 2021-09-08

## 2021-09-10 ENCOUNTER — TELEPHONE (OUTPATIENT)
Dept: SURGERY | Facility: CLINIC | Age: 26
End: 2021-09-10

## 2021-09-15 ENCOUNTER — PRE VISIT (OUTPATIENT)
Dept: PLASTIC SURGERY | Facility: CLINIC | Age: 26
End: 2021-09-15

## 2021-09-24 DIAGNOSIS — F64.9 GENDER DYSPHORIA: ICD-10-CM

## 2021-09-24 RX ORDER — NEEDLES, SAFETY 22GX1 1/2"
NEEDLE, DISPOSABLE MISCELLANEOUS
Qty: 50 EACH | Refills: 1 | Status: SHIPPED | OUTPATIENT
Start: 2021-09-24 | End: 2022-06-01

## 2021-10-11 ENCOUNTER — HEALTH MAINTENANCE LETTER (OUTPATIENT)
Age: 26
End: 2021-10-11

## 2021-11-12 ENCOUNTER — OFFICE VISIT (OUTPATIENT)
Dept: FAMILY MEDICINE | Facility: CLINIC | Age: 26
End: 2021-11-12
Payer: COMMERCIAL

## 2021-11-12 VITALS
DIASTOLIC BLOOD PRESSURE: 75 MMHG | OXYGEN SATURATION: 99 % | SYSTOLIC BLOOD PRESSURE: 108 MMHG | HEIGHT: 64 IN | BODY MASS INDEX: 30.19 KG/M2 | HEART RATE: 77 BPM | TEMPERATURE: 98.4 F | WEIGHT: 176.8 LBS

## 2021-11-12 DIAGNOSIS — Z01.818 PREOP GENERAL PHYSICAL EXAM: Primary | ICD-10-CM

## 2021-11-12 DIAGNOSIS — F90.8 ATTENTION DEFICIT HYPERACTIVITY DISORDER (ADHD), OTHER TYPE: ICD-10-CM

## 2021-11-12 DIAGNOSIS — Z23 NEED FOR PROPHYLACTIC VACCINATION AND INOCULATION AGAINST INFLUENZA: ICD-10-CM

## 2021-11-12 DIAGNOSIS — H05.20 EXOPHTHALMOS: ICD-10-CM

## 2021-11-12 DIAGNOSIS — Z00.00 HEALTHCARE MAINTENANCE: ICD-10-CM

## 2021-11-12 DIAGNOSIS — F64.9 GENDER DYSPHORIA: ICD-10-CM

## 2021-11-12 LAB
ALBUMIN SERPL-MCNC: 3.7 G/DL (ref 3.4–5)
ALP SERPL-CCNC: 83 U/L (ref 40–150)
ALT SERPL W P-5'-P-CCNC: 24 U/L (ref 0–70)
AST SERPL W P-5'-P-CCNC: 19 U/L (ref 0–45)
BILIRUB DIRECT SERPL-MCNC: <0.1 MG/DL (ref 0–0.2)
BILIRUB SERPL-MCNC: 0.2 MG/DL (ref 0.2–1.3)
CHOLEST SERPL-MCNC: 122 MG/DL
FASTING STATUS PATIENT QL REPORTED: NORMAL
FASTING STATUS PATIENT QL REPORTED: NORMAL
GLUCOSE BLD-MCNC: 81 MG/DL (ref 70–99)
HCV AB SERPL QL IA: NONREACTIVE
HDLC SERPL-MCNC: 50 MG/DL
HGB BLD-MCNC: 13.5 G/DL (ref 11.7–17.7)
LDLC SERPL CALC-MCNC: 60 MG/DL
NONHDLC SERPL-MCNC: 72 MG/DL
PROT SERPL-MCNC: 7.9 G/DL (ref 6.8–8.8)
T4 FREE SERPL-MCNC: 1.09 NG/DL
TRIGL SERPL-MCNC: 60 MG/DL
TSH SERPL DL<=0.005 MIU/L-ACNC: 4.15 MU/L (ref 0.4–4)

## 2021-11-12 PROCEDURE — 84439 ASSAY OF FREE THYROXINE: CPT | Performed by: STUDENT IN AN ORGANIZED HEALTH CARE EDUCATION/TRAINING PROGRAM

## 2021-11-12 PROCEDURE — 36415 COLL VENOUS BLD VENIPUNCTURE: CPT | Performed by: STUDENT IN AN ORGANIZED HEALTH CARE EDUCATION/TRAINING PROGRAM

## 2021-11-12 PROCEDURE — 99214 OFFICE O/P EST MOD 30 MIN: CPT | Mod: 25 | Performed by: STUDENT IN AN ORGANIZED HEALTH CARE EDUCATION/TRAINING PROGRAM

## 2021-11-12 PROCEDURE — 82947 ASSAY GLUCOSE BLOOD QUANT: CPT | Performed by: STUDENT IN AN ORGANIZED HEALTH CARE EDUCATION/TRAINING PROGRAM

## 2021-11-12 PROCEDURE — 86803 HEPATITIS C AB TEST: CPT | Performed by: STUDENT IN AN ORGANIZED HEALTH CARE EDUCATION/TRAINING PROGRAM

## 2021-11-12 PROCEDURE — 80061 LIPID PANEL: CPT | Performed by: STUDENT IN AN ORGANIZED HEALTH CARE EDUCATION/TRAINING PROGRAM

## 2021-11-12 PROCEDURE — 90686 IIV4 VACC NO PRSV 0.5 ML IM: CPT | Performed by: STUDENT IN AN ORGANIZED HEALTH CARE EDUCATION/TRAINING PROGRAM

## 2021-11-12 PROCEDURE — 84403 ASSAY OF TOTAL TESTOSTERONE: CPT | Mod: KX | Performed by: STUDENT IN AN ORGANIZED HEALTH CARE EDUCATION/TRAINING PROGRAM

## 2021-11-12 PROCEDURE — 84443 ASSAY THYROID STIM HORMONE: CPT | Performed by: STUDENT IN AN ORGANIZED HEALTH CARE EDUCATION/TRAINING PROGRAM

## 2021-11-12 PROCEDURE — 90471 IMMUNIZATION ADMIN: CPT | Performed by: STUDENT IN AN ORGANIZED HEALTH CARE EDUCATION/TRAINING PROGRAM

## 2021-11-12 PROCEDURE — 85018 HEMOGLOBIN: CPT | Performed by: STUDENT IN AN ORGANIZED HEALTH CARE EDUCATION/TRAINING PROGRAM

## 2021-11-12 PROCEDURE — 80076 HEPATIC FUNCTION PANEL: CPT | Performed by: STUDENT IN AN ORGANIZED HEALTH CARE EDUCATION/TRAINING PROGRAM

## 2021-11-12 RX ORDER — TESTOSTERONE CYPIONATE 200 MG/ML
80 INJECTION, SOLUTION INTRAMUSCULAR WEEKLY
Qty: 4 ML | Refills: 0 | Status: SHIPPED | OUTPATIENT
Start: 2021-11-12 | End: 2022-06-01

## 2021-11-12 RX ORDER — DEXTROAMPHETAMINE SACCHARATE, AMPHETAMINE ASPARTATE MONOHYDRATE, DEXTROAMPHETAMINE SULFATE AND AMPHETAMINE SULFATE 2.5; 2.5; 2.5; 2.5 MG/1; MG/1; MG/1; MG/1
10 CAPSULE, EXTENDED RELEASE ORAL DAILY
Qty: 30 CAPSULE | Refills: 0 | Status: SHIPPED | OUTPATIENT
Start: 2021-11-12 | End: 2023-12-19

## 2021-11-12 ASSESSMENT — MIFFLIN-ST. JEOR: SCORE: 1526.96

## 2021-11-12 NOTE — PROGRESS NOTES
[address]  [address] [place] [id number]  Phone: [phone]  Fax: [phone]  Primary Provider: Stefanie Rueda  Pre-op Performing Provider: SAMMIE ALTMAN      PREOPERATIVE EVALUATION:  Today's date: 11/12/2021        Elva Aaron is a 26 year old adult who presents for a preoperative evaluation.    Surgical Information:  Surgery/Procedure: Bilateral mastectomy  Surgery Location: [facility] [place]  Surgeon: Dr. Mulligan  Surgery Date: 12/17  Time of Surgery: 7:15 AM  Where patient plans to recover: At home with family  Fax number for surgical facility: Note does not need to be faxed, will be available electronically in Epic.    Type of Anesthesia Anticipated: General    Assessment & Plan     The proposed surgical procedure is considered LOW risk.    Preop general physical exam  Gender dysphoria  Attention deficit hyperactivity disorder (ADHD), other type  Exophthalmos  - TSH with free T4 reflex      Risks and Recommendations:  The patient has the following additional risks and recommendations for perioperative complications:   - No identified additional risk factors other than previously addressed    Medication Instructions:  Patient is to take all scheduled medications on the day of surgery EXCEPT for modifications listed below:   - Psychostimulants: Hold the day of surgery    RECOMMENDATION:  APPROVAL GIVEN to proceed with proposed procedure pending review of diagnostic evaluation. (Pending normal thyroid function; TSH pending.)    Review of the result(s) of each unique test - pending. Reviewed prior Hb 8/20/21 and mammogram.    :[id number]}    Subjective     HPI related to upcoming procedure: Gender dysphoria. Has been on testosterone for a bit over a year now. Has dysphoria related to chest and has worked with surgeon to move toward bilateral mastectomy.       Preop Questions 11/12/2021   1. Have you ever had a heart attack or stroke? No   2. Have you ever had surgery on  your heart or blood vessels, such as a stent placement, a coronary artery bypass, or surgery on an artery in your head, neck, heart, or legs? No   3. Do you have chest pain with activity? No   4. Do you have a history of  heart failure? No   5. Do you currently have a cold, bronchitis or symptoms of other infection? No   6. Do you have a cough, shortness of breath, or wheezing? No   7. Do you or anyone in your family have previous history of blood clots? YES - Grandma (66) has clots; first noticed after car accident. Now on blood thinner. No other immediate family history of blood clot. Zed doesn't have personal history of clots.    8. Do you or does anyone in your family have a serious bleeding problem such as prolonged bleeding following surgeries or cuts? No   9. Have you ever had problems with anemia or been told to take iron pills? YES - Has had anemia in the past, but not now. Hb 8/20/21 was 14.3.    10. Have you had any abnormal blood loss such as black, tarry or bloody stools, or abnormal vaginal bleeding? YES - In the past he's had irregular periods and sometimes lasting more than 2 weeks. Has had occasional spotting while on T (plus has implanon), but no true periods.    11. Have you ever had a blood transfusion? No   12. Are you willing to have a blood transfusion if it is medically needed before, during, or after your surgery? Yes   13. Have you or any of your relatives ever had problems with anesthesia? No   14. Do you have sleep apnea, excessive snoring or daytime drowsiness? No   15. Do you have any artifical heart valves or other implanted medical devices like a pacemaker, defibrillator, or continuous glucose monitor? No   16. Do you have artificial joints? No   17. Are you allergic to latex? No   18. Is there any chance that you may be pregnant? No     Health Care Directive:  Patient does not have a Health Care Directive or Living Will: Discussed advance care planning with patient; information given  to patient to review.    Preoperative Review of :   reviewed - controlled substances reflected in medication list.      Status of Chronic Conditions:  See problem list for active medical problems.  Problems all longstanding and stable, except as noted/documented.  See ROS for pertinent symptoms related to these conditions.      Review of Systems  CONSTITUTIONAL: NEGATIVE for fever, chills, change in weight  INTEGUMENTARY/SKIN: NEGATIVE for worrisome rashes, moles or lesions  EYES: NEGATIVE for vision changes or irritation  ENT/MOUTH: NEGATIVE for ear, mouth and throat problems  RESP: NEGATIVE for significant cough or SOB  CV: NEGATIVE for chest pain, palpitations or peripheral edema  GI: Some heartburn. NEGATIVE for nausea, abdominal pain, or change in bowel habits  : NEGATIVE for frequency, dysuria, or hematuria  MUSCULOSKELETAL: NEGATIVE for significant arthralgias or myalgia  NEURO: NEGATIVE for weakness, dizziness or paresthesias  ENDOCRINE: NEGATIVE for temperature intolerance, skin/hair changes, no diarrhea/constipation, no racing heart  HEME: NEGATIVE for bleeding problems  PSYCHIATRIC: Some social anxiety. NEGATIVE for changes in mood or affect    Patient Active Problem List    Diagnosis Date Noted     ADHD (attention deficit hyperactivity disorder) 07/21/2020     Priority: Medium     Anxiety 07/21/2020     Priority: Medium     Major depressive disorder, recurrent, in full remission (H) 07/21/2020     Priority: Medium     Gender dysphoria 07/21/2020     Priority: Medium     Migraine with aura and without status migrainosus, not intractable 07/21/2020     Priority: Medium     Acid reflux 07/21/2020     Priority: Medium     Managed with OTC meds        History reviewed. No pertinent past medical history.  Past Surgical History:   Procedure Laterality Date     ADENOIDECTOMY       COSMETIC PLACEMENT OF DENTAL IMPLANT(S)       HC TOOTH EXTRACTION W/FORCEP       TONSILLECTOMY       Current Outpatient  "Medications   Medication Sig Dispense Refill     amphetamine-dextroamphetamine (ADDERALL XR) 10 MG 24 hr capsule Take 1 capsule (10 mg) by mouth daily 30 capsule 0     BD SAFETYGLIDE NEEDLE 27G X 5/8\" MISC USE ONE NEEDLE ONCE WEEKLY 50 each 1     ibuprofen (ADVIL/MOTRIN) 200 MG capsule Take 200 mg by mouth every 4 hours as needed for fever       Needle, Disp, (BD DISP NEEDLE) 23G X 1\" MISC 1 Units once a week 50 each 1     SUMAtriptan (IMITREX) 25 MG tablet Take 25 mg by mouth at onset of headache for migraine       syringe, disposable, 1 ML MISC 1 Units once a week 60 each 1     testosterone cypionate (DEPOTESTOSTERONE) 200 MG/ML injection Inject 0.4 mLs (80 mg) into the muscle once a week Inject 0.4mLs (80mg) into the muscle once a week. Needs to be seen in clinic before next refill 4 mL 3       No Known Allergies     Social History     Tobacco Use     Smoking status: Never Smoker     Smokeless tobacco: Never Used   Substance Use Topics     Alcohol use: Yes     Comment: occ.     Family History   Problem Relation Age of Onset     Clotting Disorder Maternal Grandmother      Cancer Maternal Grandfather         Colon Ca     Thyroid Disease Mother      History   Drug Use Unknown         Objective     /75   Pulse 77   Temp 98.4  F (36.9  C) (Oral)   Ht 1.626 m (5' 4\")   Wt 80.2 kg (176 lb 12.8 oz)   SpO2 99%   BMI 30.35 kg/m      Physical Exam    GENERAL APPEARANCE: healthy, alert and no distress      EYES: EOMI, PERRL, mild exophthalmos     HENT: ear canals and TM's normal and nose and mouth without ulcers or lesions, some acne     NECK: no adenopathy, no asymmetry, masses, or scars and thyroid normal to palpation  CHEST: breast tissue normal without mass, dimpling, drainage     RESP: lungs clear to auscultation - no rales, rhonchi or wheezes     CV: regular rates and rhythm, normal S1 S2, no S3 or S4 and no murmur, click or rub     ABDOMEN:  soft, nontender, no HSM or masses and bowel sounds normal     MS: " extremities normal- no gross deformities noted, no evidence of inflammation in joints, FROM in all extremities.     SKIN: no suspicious lesions or rashes     NEURO: Normal strength and tone, sensory exam grossly normal, mentation intact and speech normal     PSYCH: mentation appears normal. and affect normal/bright     LYMPHATICS: No cervical adenopathy    Recent Labs   Lab Test 08/20/21  1504 03/19/21  1538 12/04/20  1431   HGB 14.3 14.3 12.7   NA  --  137.4 132.9   POTASSIUM  --  3.9 3.7   CR  --  0.8 0.7        Diagnostics:  Labs pending at this time.  Results will be reviewed when available.   No EKG required, no history of coronary heart disease, significant arrhythmia, peripheral arterial disease or other structural heart disease.    Revised Cardiac Risk Index (RCRI):  The patient has the following serious cardiovascular risks for perioperative complications:   - No serious cardiac risks = 0 points     RCRI Interpretation: 0 points: Class I (very low risk - 0.4% complication rate)           Signed Electronically by: Beverly Guevara MD  Copy of this evaluation report is provided to requesting physician.

## 2021-11-12 NOTE — PATIENT INSTRUCTIONS
Hold Adderall the day of your surgery.   Come back to see your primary doctor or me within the next month for ADHD follow-up and gender care follow-up so we can get you set up with three months of meds.   Also consider follow up with PCP regarding history of abnormal periods, though this is less concerning to me given they have mostly abated with testosterone and implanon.     Preparing for Your Surgery  Getting started  A nurse will call you to review your health history and instructions. They will give you an arrival time based on your scheduled surgery time.  Please be ready to share the following:    Your doctor's clinic name and phone number    Your medical, surgical and anesthesia history    A list of allergies and sensitivities    A list of medicines, including herbal treatments and over-the-counter drugs    Whether the patient has a legal guardian (ask how to send us the papers in advance)  If you have a child who's having surgery, please ask for a copy of Preparing for Your Child's Surgery.    Preparing for surgery    Within 30 days of surgery: Have a pre-op exam (sometimes called an H&P, or History and Physical). This can be done at a clinic or pre-operative center.  ? If you're having a , you may not need this exam. Talk to your care team    At your pre-op exam, talk to your care team about all medicines you take. If you need to stop any medicines before surgery, ask when to start taking them again.  ? We do this for your safety. Many medicines can make you bleed too much during surgery. Some change how well surgery (anesthesia) drugs work.    Call your insurance company to let them know you're having surgery. (If you don't have insurance, call [phone].)    Call your clinic if there's any change in your health. This includes signs of a cold or flu (sore throat, runny nose, cough, rash, fever). It also includes a scrape or scratch near the surgery site.    If you have questions on the day of  surgery, call your hospital or surgery center.  Eating and drinking guidelines  For your safety: Unless your surgeon tells you otherwise, follow the guidelines below.    Eat and drink as usual until 8 hours before surgery. After that, no food or milk.    Drink clear liquids until 2 hours before surgery. These are liquids you can see through, like water, Gatorade and Propel Water. You may also have black coffee and tea (no cream or milk).    Nothing by mouth within 2 hours of surgery. This includes gum, candy and breath mints.    If you drink, stop drinking alcohol the night before surgery.    If your care team tells you to take medicine on the morning of surgery, it's okay to take it with a sip of water.  Preventing infection    Shower or bathe the night before and morning of your surgery. Follow the instructions your clinic gave you. (If no instructions, use regular soap.)    Don't shave or clip hair near your surgery site. We'll remove the hair if needed.    Don't smoke or vape the morning of surgery. You may chew nicotine gum up to 2 hours before surgery. A nicotine patch is okay.  ? Note: Some surgeries require you to completely quit smoking and nicotine. Check with your surgeon.    Your care team will make every effort to keep you safe from infection. We will:  ? Clean our hands often with soap and water (or an alcohol-based hand rub).  ? Clean the skin at your surgery site with a special soap that kills germs.  ? Give you a special gown to keep you warm. (Cold raises the risk of infection.)  ? Wear special hair covers, masks, gowns and gloves during surgery.  ? Give antibiotic medicine, if prescribed. Not all surgeries need antibiotics.  What to bring on the day of surgery    Photo ID and insurance card    Copy of your health care directive, if you have one    Glasses and hearing aides (bring cases)  ? You can't wear contacts during surgery    Inhaler and eye drops, if you use them (tell us about these when  you arrive)    CPAP machine or breathing device, if you use them    A few personal items, if spending the night    If you have . . .  ? A pacemaker or ICD (cardiac defibrillator): Bring the ID card.  ? An implanted stimulator: Bring the remote control.  ? A legal guardian: Bring a copy of the certified (court-stamped) guardianship papers.  Please remove any jewelry, including body piercings. Leave jewelry and other valuables at home.  If you're going home the day of surgery  Important: If you don't follow the rules below, we must cancel your surgery.     Arrange for someone to drive you home after surgery. You may not drive, take a taxi or take public transportation by yourself (unless you'll have local anesthesia only).    Arrange for a responsible adult to stay with you overnight. If you don't, we may keep you in the hospital overnight, and you may need to pay the costs yourself.  Questions?   If you have any questions for your care team, list them here: _________________________________________________________________________________________________________________________________________________________________________________________________________________________________________________________________________________________________________________________  For informational purposes only. Not to replace the advice of your health care provider. Copyright   2003, 2019 [facility]. All rights reserved. Clinically reviewed by Aidee Montano MD. [employer] [id number] - REV 4/20.

## 2021-11-12 NOTE — PROGRESS NOTES
Additional topics discussed and addressed at Pre-Op Appointment:     Subjective:  ADHD: Had formal eval by Dr. Fabiola Kirkland in diaz year of high school. Has been on adderall on and off since about a year after diagnosis. Last fill was in August d/t insurance lapse. Has found himself more distracted lately and difficult to concentrate, but coping okay. Will get UDS today and provide one month refill. Informed patient he must return for specific ADHD follow-up with PCP within a month to get ongoing refills.     Gender: Needs testosterone refill. Obtained gender affirming labs for ongoing testosterone therapy. Has been out of testosterone for a little over a month. Dysphoria about chest. Facial hair growing in well. Occasional vaginal spotting. Didn't notice regression of masculinization when off of T for ~1 month d/t lapse in insurance, which makes him happy.     Assessment/Plan:   ADHD- UDS obtained today. Refilled adderall 10mg daily for one month. Needs follow-up with PCP or me within one month for ongoing refills.     Gender dysphoria- Alvin testosterone therapy labs (Hb, Lipids, Glucose, LFTs, Total testosterone)-- pending. Refilled T for one month. Needs follow-up within one month to review labs and for gender-specific visit and ongoing refills.     Beverly Guevara MD

## 2021-11-16 LAB — TESTOST SERPL-MCNC: 61 NG/DL (ref 8–950)

## 2021-11-25 DIAGNOSIS — Z11.59 ENCOUNTER FOR SCREENING FOR OTHER VIRAL DISEASES: ICD-10-CM

## 2021-11-30 ENCOUNTER — OFFICE VISIT (OUTPATIENT)
Dept: PLASTIC SURGERY | Facility: CLINIC | Age: 26
End: 2021-11-30
Payer: COMMERCIAL

## 2021-11-30 VITALS
TEMPERATURE: 98.5 F | HEART RATE: 98 BPM | WEIGHT: 177 LBS | OXYGEN SATURATION: 99 % | BODY MASS INDEX: 30.22 KG/M2 | SYSTOLIC BLOOD PRESSURE: 102 MMHG | DIASTOLIC BLOOD PRESSURE: 68 MMHG | HEIGHT: 64 IN

## 2021-11-30 DIAGNOSIS — F64.0 GENDER DYSPHORIA IN ADOLESCENT AND ADULT: Primary | ICD-10-CM

## 2021-11-30 PROCEDURE — 99213 OFFICE O/P EST LOW 20 MIN: CPT | Performed by: SURGERY

## 2021-11-30 ASSESSMENT — PAIN SCALES - GENERAL: PAINLEVEL: NO PAIN (0)

## 2021-11-30 ASSESSMENT — MIFFLIN-ST. JEOR: SCORE: 1527.87

## 2021-11-30 NOTE — PROGRESS NOTES
PLASTICS PRE-OP  This is a 26 year old biological female who identifies as male who presents for his pre-op visit prior to bilateral simple mastectomy with nipple graft reconstruction scheduled for 12/17/2021. (he was originally scheduled with Dr Bailey). He is here today with boyfriend/partner, Chandler who will help with his post op cares (who had top surgery with Dr Grant a few years ago). The patient has had a negative mammogram, and their letter of support from [employer] has been received. History and physical was done last week. COVID test is scheduled for 12/14/2021. Lost 30 lbs due to eating healthier and walking more.     My LPN, Ryanne, discussed periop instructions with the patient including: not eating anything 8 hours prior to surgery, drinking clear liquids up to 2 hours before surgery except for morning medications with a sip of water, the preop shower with surgical soap which was given, and wearing a button- or zip-up shirt on the day of surgery. She also instructed the patient to avoid NSAIDs x 1 week both before AND after surgery, but they may take Tylenol post-op for pain as needed. She also gave the patient a folder with information on kim-op topics, including where the surgery will be.     I discussed the following with the patient; preop, intraop and postop phases of care on the day of surgery, the placement of a bladder catheter during surgery that will likely be removed in recovery, postop cares and limitations with relation to home and work settings, 5-lb weight restriction for the first 3 weeks postop, and how long to maintain limited activities. We also discussed Zofran, oxycodone, Z-arjun, and antipruritics which will be prescribed. We discussed that preventing constipation will be their responsibility, and we discussed methods such as aloe, prune juice or Miralax.     In addition, we went over the possible risks and complications involved with this elective procedure. These include but  "are not limited to: infection, bleeding, hematoma/seroma formation, and poor healing (including dehiscence, nipple graft loss, or hypertrophic scarring). We also discussed the possibility of altered chest sensation (either hypo or hypersensitive), residual deformities and asymmetries, possible further surgical revisions, and possible injury to surrounding neurovascular and musculoskeletal structures, including intra-axillary or intra-thoracic. We lastly discussed anesthetic risks including DVT/PE or cardiopulmonary events.     PE: General: Height: 5' 4\" Weight: 177 lbs 0 oz   Chest:   Nice upper chest contour.  More fullness towards upper L chest, and tapering off to the sides.   Grade 3 nipple ptosis.   R breast (300-400 g) is slightly larger than the L (300 g).   IMFs situated about 3-4 cms below the pec muscle. Incisions may touch at midline.   L IMF situated about 1 cm lower than the R.   Mild lateral thoracic rolls.   No anterior axillary folds.    No lymphadenopathy or masses.   Photos taken with consent.       Total time = 20 minutes, spent on the date of encounter doing chart review, history and physical, dressing changes, documentation, patient education, and any further activity as noted above.     This note was prepared on behalf of Lashanda Mulligan MD by Marce Delacruz, a trained medical scribe, based on my observations and the provider's statements to me.     "

## 2021-11-30 NOTE — NURSING NOTE
"Chief Complaint   Patient presents with     RECHECK     Zed, is being seen today for a pre -op DOS 12/17       Vitals:    11/30/21 1501   BP: 102/68   BP Location: Left arm   Patient Position: Chair   Cuff Size: Adult Large   Pulse: 98   Temp: 98.5  F (36.9  C)   TempSrc: Oral   SpO2: 99%   Weight: 80.3 kg (177 lb)   Height: 1.626 m (5' 4\")       Body mass index is 30.38 kg/m .      Ryanne Gomez LPN    "

## 2021-11-30 NOTE — LETTER
11/30/2021       RE: Elva Aaron  [address]  [place] [address]     Dear Colleague,    Thank you for referring your patient, Elva Aaron, to the [facility] [place] at [facility]. Please see a copy of my visit note below.    PLASTICS PRE-OP  This is a 26 year old biological female who identifies as male who presents for his pre-op visit prior to bilateral simple mastectomy with nipple graft reconstruction scheduled for 12/17/2021. (he was originally scheduled with Dr Bailey). He is here today with boyfriend/partner, Chandler who will help with his post op cares (who had top surgery with Dr Grant a few years ago). The patient has had a negative mammogram, and their letter of support from [employer] has been received. History and physical was done last week. COVID test is scheduled for 12/14/2021. Lost 30 lbs due to eating healthier and walking more.     My LPN, Ryanne, discussed periop instructions with the patient including: not eating anything 8 hours prior to surgery, drinking clear liquids up to 2 hours before surgery except for morning medications with a sip of water, the preop shower with surgical soap which was given, and wearing a button- or zip-up shirt on the day of surgery. She also instructed the patient to avoid NSAIDs x 1 week both before AND after surgery, but they may take Tylenol post-op for pain as needed. She also gave the patient a folder with information on kim-op topics, including where the surgery will be.     I discussed the following with the patient; preop, intraop and postop phases of care on the day of surgery, the placement of a bladder catheter during surgery that will likely be removed in recovery, postop cares and limitations with relation to home and work settings, 5-lb weight restriction for the first 3 weeks postop, and how long to maintain limited activities. We also  "discussed Zofran, oxycodone, Z-arjun, and antipruritics which will be prescribed. We discussed that preventing constipation will be their responsibility, and we discussed methods such as aloe, prune juice or Miralax.     In addition, we went over the possible risks and complications involved with this elective procedure. These include but are not limited to: infection, bleeding, hematoma/seroma formation, and poor healing (including dehiscence, nipple graft loss, or hypertrophic scarring). We also discussed the possibility of altered chest sensation (either hypo or hypersensitive), residual deformities and asymmetries, possible further surgical revisions, and possible injury to surrounding neurovascular and musculoskeletal structures, including intra-axillary or intra-thoracic. We lastly discussed anesthetic risks including DVT/PE or cardiopulmonary events.     PE: General: Height: 5' 4\" Weight: 177 lbs 0 oz   Chest:   Nice upper chest contour.  More fullness towards upper L chest, and tapering off to the sides.   Grade 3 nipple ptosis.   R breast (300-400 g) is slightly larger than the L (300 g).   IMFs situated about 3-4 cms below the pec muscle. Incisions may touch at midline.   L IMF situated about 1 cm lower than the R.   Mild lateral thoracic rolls.   No anterior axillary folds.    No lymphadenopathy or masses.   Photos taken with consent.       Total time = 20 minutes, spent on the date of encounter doing chart review, history and physical, dressing changes, documentation, patient education, and any further activity as noted above.     This note was prepared on behalf of Lashanda Mulligan MD by Marce Delacruz, a trained medical scribe, based on my observations and the provider's statements to me.         Again, thank you for allowing me to participate in the care of your patient.      Sincerely,    Lashanda Mulligan MD      "

## 2021-12-14 ENCOUNTER — LAB (OUTPATIENT)
Dept: LAB | Facility: CLINIC | Age: 26
End: 2021-12-14
Payer: COMMERCIAL

## 2021-12-14 DIAGNOSIS — Z11.59 ENCOUNTER FOR SCREENING FOR OTHER VIRAL DISEASES: ICD-10-CM

## 2021-12-14 PROCEDURE — U0005 INFEC AGEN DETEC AMPLI PROBE: HCPCS

## 2021-12-14 PROCEDURE — U0003 INFECTIOUS AGENT DETECTION BY NUCLEIC ACID (DNA OR RNA); SEVERE ACUTE RESPIRATORY SYNDROME CORONAVIRUS 2 (SARS-COV-2) (CORONAVIRUS DISEASE [COVID-19]), AMPLIFIED PROBE TECHNIQUE, MAKING USE OF HIGH THROUGHPUT TECHNOLOGIES AS DESCRIBED BY CMS-2020-01-R: HCPCS

## 2021-12-14 NOTE — TELEPHONE ENCOUNTER
FUTURE VISIT INFORMATION      SURGERY INFORMATION:    Date: 12/17/21    Location:  OR    Surgeon:  Lashanda Mulligan MD    Anesthesia Type:  General    Procedure: MASTECTOMY, BILATERAL, SIMPLE, WITH nipple grafts. OnQ    Consult: ov 11/30    RECORDS REQUESTED FROM:        Primary Care Provider: Stefanie [facility]ueda DO- [facility]

## 2021-12-15 LAB — SARS-COV-2 RNA RESP QL NAA+PROBE: NEGATIVE

## 2021-12-16 ENCOUNTER — OFFICE VISIT (OUTPATIENT)
Dept: SURGERY | Facility: CLINIC | Age: 26
End: 2021-12-16
Payer: COMMERCIAL

## 2021-12-16 ENCOUNTER — PRE VISIT (OUTPATIENT)
Dept: SURGERY | Facility: CLINIC | Age: 26
End: 2021-12-16

## 2021-12-16 ENCOUNTER — ANESTHESIA EVENT (OUTPATIENT)
Dept: SURGERY | Facility: AMBULATORY SURGERY CENTER | Age: 26
End: 2021-12-16
Payer: COMMERCIAL

## 2021-12-16 VITALS
DIASTOLIC BLOOD PRESSURE: 69 MMHG | OXYGEN SATURATION: 100 % | TEMPERATURE: 98 F | BODY MASS INDEX: 30.05 KG/M2 | HEART RATE: 89 BPM | WEIGHT: 176 LBS | HEIGHT: 64 IN | SYSTOLIC BLOOD PRESSURE: 105 MMHG

## 2021-12-16 DIAGNOSIS — Z01.818 PRE-OP EXAMINATION: Primary | ICD-10-CM

## 2021-12-16 PROCEDURE — 99417 PROLNG OP E/M EACH 15 MIN: CPT | Performed by: PHYSICIAN ASSISTANT

## 2021-12-16 PROCEDURE — 99205 OFFICE O/P NEW HI 60 MIN: CPT | Performed by: PHYSICIAN ASSISTANT

## 2021-12-16 RX ORDER — ACETAMINOPHEN 325 MG/1
325-650 TABLET ORAL EVERY 6 HOURS PRN
COMMUNITY

## 2021-12-16 ASSESSMENT — PAIN SCALES - GENERAL: PAINLEVEL: NO PAIN (0)

## 2021-12-16 ASSESSMENT — MIFFLIN-ST. JEOR: SCORE: 1523.33

## 2021-12-16 NOTE — ANESTHESIA PREPROCEDURE EVALUATION
Anesthesia Pre-Procedure Evaluation    Patient: Elva Aaron   MRN: [id number] : 1995        Preoperative Diagnosis: Gender dysphoria in adolescent and adult [F64.0]    Procedure : Procedure(s):  MASTECTOMY, BILATERAL, SIMPLE, WITH nipple grafts. OnQ  PAC EVALUATION       No past medical history on file.   Past Surgical History:   Procedure Laterality Date     ADENOIDECTOMY       COSMETIC PLACEMENT OF DENTAL IMPLANT(S)       HC TOOTH EXTRACTION W/FORCEP       TONSILLECTOMY        No Known Allergies   Social History     Tobacco Use     Smoking status: Never Smoker     Smokeless tobacco: Never Used   Substance Use Topics     Alcohol use: Yes     Comment: occ.      Wt Readings from Last 1 Encounters:   21 80.3 kg (177 lb)        Anesthesia Evaluation   Pt has had prior anesthetic. Type: General.    No history of anesthetic complications       ROS/MED HX  ENT/Pulmonary:  - neg pulmonary ROS     Neurologic: Comment: Takes Imitrex prn - neg neurologic ROS   (+) migraines,     Cardiovascular:  - neg cardiovascular ROS     METS/Exercise Tolerance: >4 METS    Hematologic:  - neg hematologic  ROS  (-) history of blood clots, anemia and history of blood transfusion   Musculoskeletal:  - neg musculoskeletal ROS     GI/Hepatic: Comment: Reflux occasionally, mostly diet controlled.     (+) GERD, Asymptomatic on medication,     Renal/Genitourinary:  - neg Renal ROS     Endo:  - neg endo ROS     Psychiatric/Substance Use: Comment: Gender dysphoria  Major depressive in remission  Anxiety  ADHD takes Adderall - neg psychiatric ROS   (+) psychiatric history depression and anxiety     Infectious Disease:  - neg infectious disease ROS     Malignancy:  - neg malignancy ROS     Other:  - neg other ROS   (-) Any chance pregnant       Physical Exam    Airway  airway exam normal      Mallampati: I   TM distance: > 3 FB   Neck ROM: full   Mouth opening: > 3 cm    Respiratory Devices and Support         Dental  no notable  dental history         Cardiovascular   cardiovascular exam normal       Rhythm and rate: regular and normal     Pulmonary   pulmonary exam normal        breath sounds clear to auscultation           OUTSIDE LABS:  CBC:   Lab Results   Component Value Date    HGB 13.5 11/12/2021    HGB 14.3 08/20/2021    HCT 42.5 07/28/2020     BMP:   Lab Results   Component Value Date    .4 03/19/2021    .9 12/04/2020    POTASSIUM 3.9 03/19/2021    POTASSIUM 3.7 12/04/2020    CHLORIDE 100.6 03/19/2021    CHLORIDE 99.9 12/04/2020    CO2 27.8 03/19/2021    CO2 21.8 12/04/2020    BUN 5.2 (L) 03/19/2021    BUN 8.1 12/04/2020    CR 0.8 03/19/2021    CR 0.7 12/04/2020    GLC 81 11/12/2021    GLC 84 08/20/2021     COAGS: No results found for: PTT, INR, FIBR  POC: No results found for: BGM, HCG, HCGS  HEPATIC:   Lab Results   Component Value Date    ALBUMIN 3.7 11/12/2021    PROTTOTAL 7.9 11/12/2021    ALT 24 11/12/2021    AST 19 11/12/2021    ALKPHOS 83 11/12/2021    BILITOTAL 0.2 11/12/2021     OTHER:   Lab Results   Component Value Date    ROXANNE 9.0 03/19/2021    TSH 4.15 (H) 11/12/2021    T4 1.09 11/12/2021       Anesthesia Plan    ASA Status:  1   NPO Status:  NPO Appropriate    Anesthesia Type: General.     - Airway: LMA   Induction: Intravenous.   Maintenance: TIVA.        Consents    Anesthesia Plan(s) and associated risks, benefits, and realistic alternatives discussed. Questions answered and patient/representative(s) expressed understanding.     - Discussed: Risks, Benefits and Alternatives for BOTH SEDATION and the PROCEDURE were discussed     - Discussed with:  Patient         Postoperative Care    Pain management: Oral pain medications.   PONV prophylaxis: Ondansetron (or other 5HT-3), Dexamethasone or Solumedrol     Comments:              PAC Discussion and Assessment    ASA Classification: 1  Case is suitable for: ASC  Anesthetic techniques and relevant risks discussed: GA                  PAC Resident/NP  "Anesthesia Assessment: Elva \"Zed\"  Agnieszka is a 26 year old adult biological female who identifies as male who is scheduled for bilateral mastectomy with nipple graft on 12/17/2021 by Dr Lashanda Mulligan MD in treatment of gender dysphoria.  PAC referral for risk assessment and optimization for anesthesia with comorbid conditions of GERD, anxiety, depression, ADHD, migraine.    Pre-operative considerations:  1.  Cardiac:  Functional status- METS >4.  low risk surgery with 0.4% (RCRI #) risk of major adverse cardiac event.     2.  Pulm:  Airway feasible.  CHELLE risk:  low      3. Neuro:   Migraines takes Imitrex prn last migraine was 3 weeks ago. Improved since testosterone injections.     4. GI:  Risk of PONV score = 1.  If > 2, anti-emetic intervention recommended.  GERD mostly diet controlled.     5. Psych: gender dysphoria, depression in remission, anxiety, ADHD  Takes Adderall daily   Takes testosterone subdermal  weekly    VTE risk: 0/26%    Patient is optimized and is acceptable candidate for the proposed procedure.  No further diagnostic evaluation is needed.     Patient also evaluated by  See recommendations below.     For further details of assessment, testing, and physical exam please see H and P completed on same date.    Anabella Galo PA-C  "

## 2021-12-16 NOTE — H&P
"  Pre-Operative H & P     CC:  Preoperative exam to assess for increased cardiopulmonary risk while undergoing surgery and anesthesia.    Date of Encounter: 12/16/2021  Primary Care Physician:  Stefanie Rueda     Reason for visit:   Encounter Diagnosis   Name Primary?     Pre-op examination Yes       HPI  Elva \"Zed\" Agnieszka is a 26 year old adult biological female who identifies as a male who presents for pre-operative H & P in preparation for bilateral mastectomy with nipple graft with Dr. Lashanda Mulligan on 12/17/21 at [facility].     History is obtained from the patient and chart review.       The patent met with Dr Mulligan on 11/30/21 and discussed in detail bilateral simple mastectomy with nipple graft reconstruction. The patient wishes to proceed.     Hx of abnormal bleeding or anti-platelet use: no    Menstrual history: No LMP recorded. (Menstrual status: Irregular Periods).:     Prior to Admission Medications  Current Outpatient Medications   Medication Sig Dispense Refill     amphetamine-dextroamphetamine (ADDERALL XR) 10 MG 24 hr capsule Take 1 capsule (10 mg) by mouth daily 30 capsule 0     BD SAFETYGLIDE NEEDLE 27G X 5/8\" MISC USE ONE NEEDLE ONCE WEEKLY 50 each 1     ibuprofen (ADVIL/MOTRIN) 200 MG capsule Take 200 mg by mouth every 4 hours as needed for fever       Needle, Disp, (BD DISP NEEDLE) 23G X 1\" MISC 1 Units once a week 50 each 1     SUMAtriptan (IMITREX) 25 MG tablet Take 25 mg by mouth at onset of headache for migraine       syringe, disposable, 1 ML MISC 1 Units once a week 60 each 1     testosterone cypionate (DEPOTESTOSTERONE) 200 MG/ML injection Inject 0.4 mLs (80 mg) into the muscle once a week Inject 0.4mLs (80mg) into the muscle once a week. Needs to be seen in clinic before next refill 4 mL 0       Family History  Family History   Problem Relation Age of Onset     Clotting Disorder Maternal Grandmother      Cancer Maternal Grandfather         Colon Ca     " Thyroid Disease Mother        The complete review of systems is negative other than noted in the HPI or here.     Anesthesia Pre-Procedure Evaluation    Patient: Elva Aaron   MRN: [id number] : 1995        Preoperative Diagnosis: Gender dysphoria in adolescent and adult [F64.0]    Procedure : Procedure(s):  MASTECTOMY, BILATERAL, SIMPLE, WITH nipple grafts. OnQ  PAC EVALUATION       No past medical history on file.   Past Surgical History:   Procedure Laterality Date     ADENOIDECTOMY       COSMETIC PLACEMENT OF DENTAL IMPLANT(S)       HC TOOTH EXTRACTION W/FORCEP       TONSILLECTOMY        No Known Allergies   Social History     Tobacco Use     Smoking status: Never Smoker     Smokeless tobacco: Never Used   Substance Use Topics     Alcohol use: Yes     Comment: occ.      Wt Readings from Last 1 Encounters:   21 80.3 kg (177 lb)        Anesthesia Evaluation   Pt has had prior anesthetic. Type: General.    No history of anesthetic complications       ROS/MED HX  ENT/Pulmonary:  - neg pulmonary ROS     Neurologic:  - neg neurologic ROS     Cardiovascular:  - neg cardiovascular ROS     METS/Exercise Tolerance: >4 METS    Hematologic:  - neg hematologic  ROS     Musculoskeletal:  - neg musculoskeletal ROS     GI/Hepatic:     (+) GERD, Asymptomatic on medication,     Renal/Genitourinary:  - neg Renal ROS     Endo:  - neg endo ROS     Psychiatric/Substance Use: Comment: Gender dysphoria  Major depressive in remission  Anxiety  ADHD takes Adderall  Hold day of surgery  (+) psychiatric history depression and anxiety     Infectious Disease:  - neg infectious disease ROS     Malignancy:  - neg malignancy ROS     Other:            Physical Exam    Airway  airway exam normal           Respiratory Devices and Support         Dental  no notable dental history         Cardiovascular   cardiovascular exam normal       Rhythm and rate: regular and normal     Pulmonary   pulmonary exam normal        breath sounds  "clear to auscultation           OUTSIDE LABS:  CBC:   Lab Results   Component Value Date    HGB 13.5 11/12/2021    HGB 14.3 08/20/2021    HCT 42.5 07/28/2020     BMP:   Lab Results   Component Value Date    .4 03/19/2021    .9 12/04/2020    POTASSIUM 3.9 03/19/2021    POTASSIUM 3.7 12/04/2020    CHLORIDE 100.6 03/19/2021    CHLORIDE 99.9 12/04/2020    CO2 27.8 03/19/2021    CO2 21.8 12/04/2020    BUN 5.2 (L) 03/19/2021    BUN 8.1 12/04/2020    CR 0.8 03/19/2021    CR 0.7 12/04/2020    GLC 81 11/12/2021    GLC 84 08/20/2021     COAGS: No results found for: PTT, INR, FIBR  POC: No results found for: BGM, HCG, HCGS  HEPATIC:   Lab Results   Component Value Date    ALBUMIN 3.7 11/12/2021    PROTTOTAL 7.9 11/12/2021    ALT 24 11/12/2021    AST 19 11/12/2021    ALKPHOS 83 11/12/2021    BILITOTAL 0.2 11/12/2021     OTHER:   Lab Results   Component Value Date    ROXANNE 9.0 03/19/2021    TSH 4.15 (H) 11/12/2021    T4 1.09 11/12/2021     Physical Exam  /69 (BP Location: Right arm, Patient Position: Chair, Cuff Size: Adult Regular)   Pulse 89   Temp 98  F (36.7  C) (Oral)   Ht 1.626 m (5' 4\")   Wt 79.8 kg (176 lb)   SpO2 100%   Breastfeeding No   BMI 30.21 kg/m    Constitutional: Awake, alert, cooperative, no apparent distress, and appears stated age.  Eyes: Pupils equal, round and reactive to light, extra ocular muscles intact, sclera clear, conjunctiva normal.  HENT: Normocephalic, oral pharynx with moist mucus membranes, good dentition. No goiter appreciated.   Respiratory: Clear to auscultation bilaterally, no crackles or wheezing.  Cardiovascular: Regular rate and rhythm, normal S1 and S2, and no murmur noted.  Carotids +2, no bruits. No edema. Palpable pulses to radial  DP and PT arteries.   GI: Normal bowel sounds, soft, non-distended, non-tender, no masses palpated, no hepatosplenomegaly.  Surgical scars:   Lymph/Hematologic: No cervical lymphadenopathy and no supraclavicular " lymphadenopathy.  Skin: Warm and dry.  No rashes at anticipated surgical site.   Musculoskeletal: Full ROM of neck. There is no redness, warmth, or swelling of the joints. Gross motor strength is normal.    Neurologic: Awake, alert, oriented to name, place and time. Cranial nerves II-XII are grossly intact. Gait is normal.   Neuropsychiatric: Calm, cooperative. Normal affect.     PRIOR LABS/DIAGNOSTIC STUDIES:   All labs and imaging personally reviewed     EKG/ stress test - if available please see in ROS above   No results found.  No flowsheet data found.  The patient's records and results personally reviewed by this provider.     Outside records reviewed from: care everywhere    LAB/DIAGNOSTIC STUDIES TODAY:      ASSESSMENT and PLAN  PAC Discussion and Assessment    ASA Classification: 1  Case is suitable for: ASC  Anesthetic techniques and relevant risks discussed: GA  PAC Resident/NP Anesthesia Assessment: Elva Aaron is a 26 year old adult biologically female who identifies as male who is scheduled for bilateral mastectomy with nipple graft on 12/17/2021 by Dr Lashanda Mulligan MD in treatment of gender dysphoria.  PAC referral for risk assessment and optimization for anesthesia with comorbid conditions of GERD, anxiety, depression, ADHD.     Pre-operative considerations:  1.  Cardiac:  Functional status- METS >4.  low risk surgery with 0.4% (RCRI #) risk of major adverse cardiac event.   2.  Pulm:  Airway feasible.  CHELLE risk:  low      3. Neuro:   Migraines takes Imitrex prn    4. GI:  Risk of PONV score = 1.  If > 2, anti-emetic intervention recommended.  GERD    5. Psych: gender dysphoria, depression in remission, anxiety, ADHD  Takes Adderall hold day of surgery      VTE risk: 0/26%    Patient is optimized and is acceptable candidate for the proposed procedure.  No further diagnostic evaluation is needed.         Patient was discussed with Dr Javon Malik MD       The patient is optimized and  acceptable candidate for proposed procedure. Arrival time, NPO, shower and medication instructions provided by nursing staff today.      On the day of service:     Prep time: 35 minutes  Visit time: 20 minutes  Documentation time: 30 minutes  ------------------------------------------  Total time:  Minutes 105      Anabella Galo PA-C  Preoperative Assessment Center  [facility]  Clinic and Surgery Center  Phone: [phone]  Fax: [phone]

## 2021-12-16 NOTE — PATIENT INSTRUCTIONS
Preparing for Your Surgery      Name:  Elva Aaron   MRN:  [id number]   :  1995   Today's Date:  2021         Arriving for surgery:  Surgery date:  21  Arrival time:  05:45 a.m.    Restrictions due to COVID 19:  1 consistent visitor is allowed per patient  No ill visitors  Visitors must wear face mask       parking is available for anyone with mobility limitations or disabilities. (Monday- Friday 7 am- 5 pm)    Please come to:    [facility]  [address] [place] [id number]    Check in on the 5th floor, Ambulatory Surgery Center.    What can I eat or drink?    -  You may eat and drink normally until 8 hours before surgery. (Until 11:00 pm the night before your surgery)  -  You may have clear liquids up to 4 hours before surgery. (Until 03:15 a.m.)    Examples of clear liquids:  Water  Clear broth  Juices (apple, white grape, white cranberry  and cider) without pulp  Noncarbonated, powder based beverages  (lemonade and Sujit-Aid)  Sodas (Sprite, 7-Up, ginger ale and seltzer)  Coffee or tea (without milk or cream)  Gatorade    --No alcohol for at least 24 hours before surgery    Which medicines can I take?    Hold Aspirin for 7 days before surgery.   Hold Multivitamins for 7 days before surgery.  Hold Supplements for 7 days before surgery.  Hold Ibuprofen (Advil, Motrin) for 1 day before surgery--unless otherwise directed by surgeon.  Hold Naproxen (Aleve) for 4 days before surgery.    -  DO NOT take the following medications the day of surgery:  Amphetamine-dextroamphetamine (Adderall XR),  Sumatriptan (Imitrex)    -  PLEASE TAKE the following medications the day of surgery   Tylenol if needed    How do I prepare myself?  - Please take 2 showers before surgery using Scrubcare or Hibiclens soap.    Use this soap only from the neck to your toes.     Leave the soap on your skin for one minute--then rinse thoroughly.      You may use your own shampoo and  conditioner; no other hair products.   - Please remove all jewelry and body piercings.  - No lotions, deodorants or fragrance.  - No makeup or fingernail polish.   - Bring your ID and insurance card.    -If you have a Deep Brain Stimulator, a Spinal Cord Stimulator or any implanted Neuro device you must bring the remote to the Surgery Center        - All patients are required to have a Covid-19 test within 4 days of surgery/procedure.      -Patients will be contacted by the [facility] scheduling team within 1 week of surgery to make an appointment.      - Patients may call the Scheduling team at [phone] if they have not been scheduled within 4 days of  surgery.      ALL PATIENTS ARE REQUIRED TO HAVE A RESPONSIBLE ADULT TO DRIVE AND BE IN ATTENDANCE WITH THEM FOR 24 HOURS FOLLOWING SURGERY       Questions or Concerns:    -For questions regarding the day of surgery please contact the Ambulatory Surgery Center at [phone].    -If you have health changes between today and your surgery please contact your surgeon.     For questions after surgery please call your surgeons office.

## 2021-12-16 NOTE — H&P (VIEW-ONLY)
"  Pre-Operative H & P     CC:  Preoperative exam to assess for increased cardiopulmonary risk while undergoing surgery and anesthesia.    Date of Encounter: 12/16/2021  Primary Care Physician:  Stefanie Rueda     Reason for visit:   Encounter Diagnosis   Name Primary?     Pre-op examination Yes       HPI  Elva \"Zed\" Agnieszka is a 26 year old adult biological female who identifies as a male who presents for pre-operative H & P in preparation for bilateral mastectomy with nipple graft with Dr. Lashanda Mulligan on 12/17/21 at [facility].     History is obtained from the patient and chart review.       The patent met with Dr Mulligan on 11/30/21 and discussed in detail bilateral simple mastectomy with nipple graft reconstruction. The patient wishes to proceed.     Hx of abnormal bleeding or anti-platelet use: no    Menstrual history: No LMP recorded. (Menstrual status: Irregular Periods).:     Prior to Admission Medications  Current Outpatient Medications   Medication Sig Dispense Refill     amphetamine-dextroamphetamine (ADDERALL XR) 10 MG 24 hr capsule Take 1 capsule (10 mg) by mouth daily 30 capsule 0     BD SAFETYGLIDE NEEDLE 27G X 5/8\" MISC USE ONE NEEDLE ONCE WEEKLY 50 each 1     ibuprofen (ADVIL/MOTRIN) 200 MG capsule Take 200 mg by mouth every 4 hours as needed for fever       Needle, Disp, (BD DISP NEEDLE) 23G X 1\" MISC 1 Units once a week 50 each 1     SUMAtriptan (IMITREX) 25 MG tablet Take 25 mg by mouth at onset of headache for migraine       syringe, disposable, 1 ML MISC 1 Units once a week 60 each 1     testosterone cypionate (DEPOTESTOSTERONE) 200 MG/ML injection Inject 0.4 mLs (80 mg) into the muscle once a week Inject 0.4mLs (80mg) into the muscle once a week. Needs to be seen in clinic before next refill 4 mL 0       Family History  Family History   Problem Relation Age of Onset     Clotting Disorder Maternal Grandmother      Cancer Maternal Grandfather         Colon Ca     " Thyroid Disease Mother        The complete review of systems is negative other than noted in the HPI or here.     Anesthesia Pre-Procedure Evaluation    Patient: Elva Aaron   MRN: [id number] : 1995        Preoperative Diagnosis: Gender dysphoria in adolescent and adult [F64.0]    Procedure : Procedure(s):  MASTECTOMY, BILATERAL, SIMPLE, WITH nipple grafts. OnQ  PAC EVALUATION       No past medical history on file.   Past Surgical History:   Procedure Laterality Date     ADENOIDECTOMY       COSMETIC PLACEMENT OF DENTAL IMPLANT(S)       HC TOOTH EXTRACTION W/FORCEP       TONSILLECTOMY        No Known Allergies   Social History     Tobacco Use     Smoking status: Never Smoker     Smokeless tobacco: Never Used   Substance Use Topics     Alcohol use: Yes     Comment: occ.      Wt Readings from Last 1 Encounters:   21 80.3 kg (177 lb)        Anesthesia Evaluation   Pt has had prior anesthetic. Type: General.    No history of anesthetic complications       ROS/MED HX  ENT/Pulmonary:  - neg pulmonary ROS     Neurologic:  - neg neurologic ROS     Cardiovascular:  - neg cardiovascular ROS     METS/Exercise Tolerance: >4 METS    Hematologic:  - neg hematologic  ROS     Musculoskeletal:  - neg musculoskeletal ROS     GI/Hepatic:     (+) GERD, Asymptomatic on medication,     Renal/Genitourinary:  - neg Renal ROS     Endo:  - neg endo ROS     Psychiatric/Substance Use: Comment: Gender dysphoria  Major depressive in remission  Anxiety  ADHD takes Adderall  Hold day of surgery  (+) psychiatric history depression and anxiety     Infectious Disease:  - neg infectious disease ROS     Malignancy:  - neg malignancy ROS     Other:            Physical Exam    Airway  airway exam normal           Respiratory Devices and Support         Dental  no notable dental history         Cardiovascular   cardiovascular exam normal       Rhythm and rate: regular and normal     Pulmonary   pulmonary exam normal        breath sounds  "clear to auscultation           OUTSIDE LABS:  CBC:   Lab Results   Component Value Date    HGB 13.5 11/12/2021    HGB 14.3 08/20/2021    HCT 42.5 07/28/2020     BMP:   Lab Results   Component Value Date    .4 03/19/2021    .9 12/04/2020    POTASSIUM 3.9 03/19/2021    POTASSIUM 3.7 12/04/2020    CHLORIDE 100.6 03/19/2021    CHLORIDE 99.9 12/04/2020    CO2 27.8 03/19/2021    CO2 21.8 12/04/2020    BUN 5.2 (L) 03/19/2021    BUN 8.1 12/04/2020    CR 0.8 03/19/2021    CR 0.7 12/04/2020    GLC 81 11/12/2021    GLC 84 08/20/2021     COAGS: No results found for: PTT, INR, FIBR  POC: No results found for: BGM, HCG, HCGS  HEPATIC:   Lab Results   Component Value Date    ALBUMIN 3.7 11/12/2021    PROTTOTAL 7.9 11/12/2021    ALT 24 11/12/2021    AST 19 11/12/2021    ALKPHOS 83 11/12/2021    BILITOTAL 0.2 11/12/2021     OTHER:   Lab Results   Component Value Date    ROXANNE 9.0 03/19/2021    TSH 4.15 (H) 11/12/2021    T4 1.09 11/12/2021     Physical Exam  /69 (BP Location: Right arm, Patient Position: Chair, Cuff Size: Adult Regular)   Pulse 89   Temp 98  F (36.7  C) (Oral)   Ht 1.626 m (5' 4\")   Wt 79.8 kg (176 lb)   SpO2 100%   Breastfeeding No   BMI 30.21 kg/m    Constitutional: Awake, alert, cooperative, no apparent distress, and appears stated age.  Eyes: Pupils equal, round and reactive to light, extra ocular muscles intact, sclera clear, conjunctiva normal.  HENT: Normocephalic, oral pharynx with moist mucus membranes, good dentition. No goiter appreciated.   Respiratory: Clear to auscultation bilaterally, no crackles or wheezing.  Cardiovascular: Regular rate and rhythm, normal S1 and S2, and no murmur noted.  Carotids +2, no bruits. No edema. Palpable pulses to radial  DP and PT arteries.   GI: Normal bowel sounds, soft, non-distended, non-tender, no masses palpated, no hepatosplenomegaly.  Surgical scars:   Lymph/Hematologic: No cervical lymphadenopathy and no supraclavicular " lymphadenopathy.  Skin: Warm and dry.  No rashes at anticipated surgical site.   Musculoskeletal: Full ROM of neck. There is no redness, warmth, or swelling of the joints. Gross motor strength is normal.    Neurologic: Awake, alert, oriented to name, place and time. Cranial nerves II-XII are grossly intact. Gait is normal.   Neuropsychiatric: Calm, cooperative. Normal affect.     PRIOR LABS/DIAGNOSTIC STUDIES:   All labs and imaging personally reviewed     EKG/ stress test - if available please see in ROS above   No results found.  No flowsheet data found.  The patient's records and results personally reviewed by this provider.     Outside records reviewed from: care everywhere    LAB/DIAGNOSTIC STUDIES TODAY:      ASSESSMENT and PLAN  PAC Discussion and Assessment    ASA Classification: 1  Case is suitable for: ASC  Anesthetic techniques and relevant risks discussed: GA  PAC Resident/NP Anesthesia Assessment: Elva Aaron is a 26 year old adult biologically female who identifies as male who is scheduled for bilateral mastectomy with nipple graft on 12/17/2021 by Dr Lashanda Mulligan MD in treatment of gender dysphoria.  PAC referral for risk assessment and optimization for anesthesia with comorbid conditions of GERD, anxiety, depression, ADHD.     Pre-operative considerations:  1.  Cardiac:  Functional status- METS >4.  low risk surgery with 0.4% (RCRI #) risk of major adverse cardiac event.   2.  Pulm:  Airway feasible.  CHELLE risk:  low      3. Neuro:   Migraines takes Imitrex prn    4. GI:  Risk of PONV score = 1.  If > 2, anti-emetic intervention recommended.  GERD    5. Psych: gender dysphoria, depression in remission, anxiety, ADHD  Takes Adderall hold day of surgery      VTE risk: 0/26%    Patient is optimized and is acceptable candidate for the proposed procedure.  No further diagnostic evaluation is needed.         Patient was discussed with Dr Javon Malik MD       The patient is optimized and  acceptable candidate for proposed procedure. Arrival time, NPO, shower and medication instructions provided by nursing staff today.      On the day of service:     Prep time: 35 minutes  Visit time: 20 minutes  Documentation time: 30 minutes  ------------------------------------------  Total time:  Minutes 105      Anabella Galo PA-C  Preoperative Assessment Center  [facility]  Phone: [phone]  Fax: [phone]

## 2021-12-17 ENCOUNTER — HOSPITAL ENCOUNTER (OUTPATIENT)
Facility: AMBULATORY SURGERY CENTER | Age: 26
End: 2021-12-17
Attending: SURGERY
Payer: COMMERCIAL

## 2021-12-17 ENCOUNTER — ANESTHESIA (OUTPATIENT)
Dept: SURGERY | Facility: AMBULATORY SURGERY CENTER | Age: 26
End: 2021-12-17
Payer: COMMERCIAL

## 2021-12-17 VITALS
SYSTOLIC BLOOD PRESSURE: 110 MMHG | BODY MASS INDEX: 30.22 KG/M2 | DIASTOLIC BLOOD PRESSURE: 70 MMHG | TEMPERATURE: 98 F | OXYGEN SATURATION: 96 % | HEIGHT: 64 IN | RESPIRATION RATE: 18 BRPM | HEART RATE: 89 BPM | WEIGHT: 177 LBS

## 2021-12-17 DIAGNOSIS — Z90.13 S/P MASTECTOMY, BILATERAL: Primary | ICD-10-CM

## 2021-12-17 PROCEDURE — 19350 NIPPLE/AREOLA RECONSTRUCTION: CPT | Mod: 50 | Performed by: SURGERY

## 2021-12-17 PROCEDURE — 88305 TISSUE EXAM BY PATHOLOGIST: CPT | Mod: TC | Performed by: SURGERY

## 2021-12-17 PROCEDURE — 19303 MAST SIMPLE COMPLETE: CPT | Mod: 50 | Performed by: SURGERY

## 2021-12-17 PROCEDURE — 19303 MAST SIMPLE COMPLETE: CPT | Mod: 50,GC

## 2021-12-17 PROCEDURE — 19350 NIPPLE/AREOLA RECONSTRUCTION: CPT | Mod: 50,GC

## 2021-12-17 RX ORDER — APREPITANT 40 MG/1
40 CAPSULE ORAL DAILY
Status: DISCONTINUED | OUTPATIENT
Start: 2021-12-17 | End: 2021-12-21 | Stop reason: HOSPADM

## 2021-12-17 RX ORDER — DEXAMETHASONE SODIUM PHOSPHATE 4 MG/ML
INJECTION, SOLUTION INTRA-ARTICULAR; INTRALESIONAL; INTRAMUSCULAR; INTRAVENOUS; SOFT TISSUE PRN
Status: DISCONTINUED | OUTPATIENT
Start: 2021-12-17 | End: 2021-12-17

## 2021-12-17 RX ORDER — KETOROLAC TROMETHAMINE 30 MG/ML
INJECTION, SOLUTION INTRAMUSCULAR; INTRAVENOUS PRN
Status: DISCONTINUED | OUTPATIENT
Start: 2021-12-17 | End: 2021-12-17

## 2021-12-17 RX ORDER — EPHEDRINE SULFATE 50 MG/ML
INJECTION, SOLUTION INTRAMUSCULAR; INTRAVENOUS; SUBCUTANEOUS PRN
Status: DISCONTINUED | OUTPATIENT
Start: 2021-12-17 | End: 2021-12-17

## 2021-12-17 RX ORDER — SODIUM CHLORIDE, SODIUM LACTATE, POTASSIUM CHLORIDE, CALCIUM CHLORIDE 600; 310; 30; 20 MG/100ML; MG/100ML; MG/100ML; MG/100ML
INJECTION, SOLUTION INTRAVENOUS CONTINUOUS
Status: DISCONTINUED | OUTPATIENT
Start: 2021-12-17 | End: 2021-12-21 | Stop reason: HOSPADM

## 2021-12-17 RX ORDER — ONDANSETRON 2 MG/ML
INJECTION INTRAMUSCULAR; INTRAVENOUS PRN
Status: DISCONTINUED | OUTPATIENT
Start: 2021-12-17 | End: 2021-12-17

## 2021-12-17 RX ORDER — FENTANYL CITRATE 50 UG/ML
INJECTION, SOLUTION INTRAMUSCULAR; INTRAVENOUS PRN
Status: DISCONTINUED | OUTPATIENT
Start: 2021-12-17 | End: 2021-12-17

## 2021-12-17 RX ORDER — KETAMINE HYDROCHLORIDE 10 MG/ML
INJECTION, SOLUTION INTRAMUSCULAR; INTRAVENOUS PRN
Status: DISCONTINUED | OUTPATIENT
Start: 2021-12-17 | End: 2021-12-17

## 2021-12-17 RX ORDER — ONDANSETRON 4 MG/1
4 TABLET, ORALLY DISINTEGRATING ORAL EVERY 8 HOURS PRN
Qty: 20 TABLET | Refills: 0 | Status: SHIPPED | OUTPATIENT
Start: 2021-12-17 | End: 2023-12-19

## 2021-12-17 RX ORDER — HYDROMORPHONE HYDROCHLORIDE 1 MG/ML
0.2 INJECTION, SOLUTION INTRAMUSCULAR; INTRAVENOUS; SUBCUTANEOUS EVERY 5 MIN PRN
Status: DISCONTINUED | OUTPATIENT
Start: 2021-12-17 | End: 2021-12-21 | Stop reason: HOSPADM

## 2021-12-17 RX ORDER — CEFAZOLIN SODIUM 2 G/50ML
2 SOLUTION INTRAVENOUS SEE ADMIN INSTRUCTIONS
Status: DISCONTINUED | OUTPATIENT
Start: 2021-12-17 | End: 2021-12-21 | Stop reason: HOSPADM

## 2021-12-17 RX ORDER — ONDANSETRON 2 MG/ML
4 INJECTION INTRAMUSCULAR; INTRAVENOUS EVERY 30 MIN PRN
Status: DISCONTINUED | OUTPATIENT
Start: 2021-12-17 | End: 2021-12-21 | Stop reason: HOSPADM

## 2021-12-17 RX ORDER — ONDANSETRON 4 MG/1
4 TABLET, ORALLY DISINTEGRATING ORAL EVERY 30 MIN PRN
Status: DISCONTINUED | OUTPATIENT
Start: 2021-12-17 | End: 2021-12-21 | Stop reason: HOSPADM

## 2021-12-17 RX ORDER — CEFAZOLIN SODIUM 2 G/50ML
2 SOLUTION INTRAVENOUS
Status: COMPLETED | OUTPATIENT
Start: 2021-12-17 | End: 2021-12-17

## 2021-12-17 RX ORDER — PROPOFOL 10 MG/ML
INJECTION, EMULSION INTRAVENOUS CONTINUOUS PRN
Status: DISCONTINUED | OUTPATIENT
Start: 2021-12-17 | End: 2021-12-17

## 2021-12-17 RX ORDER — FENTANYL CITRATE 50 UG/ML
25 INJECTION, SOLUTION INTRAMUSCULAR; INTRAVENOUS EVERY 5 MIN PRN
Status: DISCONTINUED | OUTPATIENT
Start: 2021-12-17 | End: 2021-12-21 | Stop reason: HOSPADM

## 2021-12-17 RX ORDER — LIDOCAINE HYDROCHLORIDE 20 MG/ML
INJECTION, SOLUTION INFILTRATION; PERINEURAL PRN
Status: DISCONTINUED | OUTPATIENT
Start: 2021-12-17 | End: 2021-12-17

## 2021-12-17 RX ORDER — ACETAMINOPHEN 325 MG/1
975 TABLET ORAL ONCE
Status: COMPLETED | OUTPATIENT
Start: 2021-12-17 | End: 2021-12-17

## 2021-12-17 RX ORDER — GLYCOPYRROLATE 0.2 MG/ML
INJECTION, SOLUTION INTRAMUSCULAR; INTRAVENOUS PRN
Status: DISCONTINUED | OUTPATIENT
Start: 2021-12-17 | End: 2021-12-17

## 2021-12-17 RX ORDER — PROPOFOL 10 MG/ML
INJECTION, EMULSION INTRAVENOUS PRN
Status: DISCONTINUED | OUTPATIENT
Start: 2021-12-17 | End: 2021-12-17

## 2021-12-17 RX ORDER — HYDROXYZINE HYDROCHLORIDE 25 MG/1
25 TABLET, FILM COATED ORAL 3 TIMES DAILY PRN
Qty: 20 TABLET | Refills: 0 | Status: SHIPPED | OUTPATIENT
Start: 2021-12-17 | End: 2023-12-19

## 2021-12-17 RX ORDER — OXYCODONE HYDROCHLORIDE 5 MG/1
5 TABLET ORAL EVERY 4 HOURS PRN
Status: DISCONTINUED | OUTPATIENT
Start: 2021-12-17 | End: 2021-12-21 | Stop reason: HOSPADM

## 2021-12-17 RX ORDER — BUPIVACAINE HYDROCHLORIDE 2.5 MG/ML
INJECTION, SOLUTION INFILTRATION; PERINEURAL PRN
Status: DISCONTINUED | OUTPATIENT
Start: 2021-12-17 | End: 2021-12-17 | Stop reason: HOSPADM

## 2021-12-17 RX ORDER — OXYCODONE HYDROCHLORIDE 5 MG/1
5 TABLET ORAL EVERY 6 HOURS PRN
Qty: 12 TABLET | Refills: 0 | Status: SHIPPED | OUTPATIENT
Start: 2021-12-17 | End: 2021-12-20

## 2021-12-17 RX ORDER — LIDOCAINE 40 MG/G
CREAM TOPICAL
Status: DISCONTINUED | OUTPATIENT
Start: 2021-12-17 | End: 2021-12-21 | Stop reason: HOSPADM

## 2021-12-17 RX ORDER — AZITHROMYCIN 250 MG/1
TABLET, FILM COATED ORAL
Qty: 6 TABLET | Refills: 0 | Status: SHIPPED | OUTPATIENT
Start: 2021-12-17 | End: 2021-12-22

## 2021-12-17 RX ORDER — FENTANYL CITRATE 50 UG/ML
25 INJECTION, SOLUTION INTRAMUSCULAR; INTRAVENOUS
Status: DISCONTINUED | OUTPATIENT
Start: 2021-12-17 | End: 2021-12-21 | Stop reason: HOSPADM

## 2021-12-17 RX ADMIN — PROPOFOL 100 MCG/KG/MIN: 10 INJECTION, EMULSION INTRAVENOUS at 10:59

## 2021-12-17 RX ADMIN — PROPOFOL 250 MG: 10 INJECTION, EMULSION INTRAVENOUS at 07:30

## 2021-12-17 RX ADMIN — FENTANYL CITRATE 25 MCG: 50 INJECTION, SOLUTION INTRAMUSCULAR; INTRAVENOUS at 13:32

## 2021-12-17 RX ADMIN — Medication 100 MCG: at 07:46

## 2021-12-17 RX ADMIN — FENTANYL CITRATE 25 MCG: 50 INJECTION, SOLUTION INTRAMUSCULAR; INTRAVENOUS at 13:49

## 2021-12-17 RX ADMIN — EPHEDRINE SULFATE 5 MG: 50 INJECTION, SOLUTION INTRAMUSCULAR; INTRAVENOUS; SUBCUTANEOUS at 07:46

## 2021-12-17 RX ADMIN — FENTANYL CITRATE 50 MCG: 50 INJECTION, SOLUTION INTRAMUSCULAR; INTRAVENOUS at 08:03

## 2021-12-17 RX ADMIN — KETAMINE HYDROCHLORIDE 26 MG: 10 INJECTION, SOLUTION INTRAMUSCULAR; INTRAVENOUS at 07:30

## 2021-12-17 RX ADMIN — ONDANSETRON 4 MG: 2 INJECTION INTRAMUSCULAR; INTRAVENOUS at 07:27

## 2021-12-17 RX ADMIN — SODIUM CHLORIDE, SODIUM LACTATE, POTASSIUM CHLORIDE, CALCIUM CHLORIDE: 600; 310; 30; 20 INJECTION, SOLUTION INTRAVENOUS at 06:31

## 2021-12-17 RX ADMIN — PROPOFOL 200 MCG/KG/MIN: 10 INJECTION, EMULSION INTRAVENOUS at 07:30

## 2021-12-17 RX ADMIN — CEFAZOLIN SODIUM 1 G: 2 SOLUTION INTRAVENOUS at 11:24

## 2021-12-17 RX ADMIN — ONDANSETRON 4 MG: 4 TABLET, ORALLY DISINTEGRATING ORAL at 14:50

## 2021-12-17 RX ADMIN — FENTANYL CITRATE 25 MCG: 50 INJECTION, SOLUTION INTRAMUSCULAR; INTRAVENOUS at 13:54

## 2021-12-17 RX ADMIN — PROPOFOL 100 MCG/KG/MIN: 10 INJECTION, EMULSION INTRAVENOUS at 08:52

## 2021-12-17 RX ADMIN — Medication 0.5 MG: at 08:08

## 2021-12-17 RX ADMIN — OXYCODONE HYDROCHLORIDE 5 MG: 5 TABLET ORAL at 13:51

## 2021-12-17 RX ADMIN — FENTANYL CITRATE 50 MCG: 50 INJECTION, SOLUTION INTRAMUSCULAR; INTRAVENOUS at 07:34

## 2021-12-17 RX ADMIN — FENTANYL CITRATE 25 MCG: 50 INJECTION, SOLUTION INTRAMUSCULAR; INTRAVENOUS at 13:41

## 2021-12-17 RX ADMIN — SODIUM CHLORIDE, SODIUM LACTATE, POTASSIUM CHLORIDE, CALCIUM CHLORIDE: 600; 310; 30; 20 INJECTION, SOLUTION INTRAVENOUS at 10:15

## 2021-12-17 RX ADMIN — KETOROLAC TROMETHAMINE 30 MG: 30 INJECTION, SOLUTION INTRAMUSCULAR; INTRAVENOUS at 11:32

## 2021-12-17 RX ADMIN — DEXAMETHASONE SODIUM PHOSPHATE 4 MG: 4 INJECTION, SOLUTION INTRA-ARTICULAR; INTRALESIONAL; INTRAMUSCULAR; INTRAVENOUS; SOFT TISSUE at 07:27

## 2021-12-17 RX ADMIN — CEFAZOLIN SODIUM 2 G: 2 SOLUTION INTRAVENOUS at 07:23

## 2021-12-17 RX ADMIN — Medication 0.5 MG: at 08:13

## 2021-12-17 RX ADMIN — GLYCOPYRROLATE 0.2 MG: 0.2 INJECTION, SOLUTION INTRAMUSCULAR; INTRAVENOUS at 07:27

## 2021-12-17 RX ADMIN — ACETAMINOPHEN 975 MG: 325 TABLET ORAL at 06:30

## 2021-12-17 RX ADMIN — APREPITANT 40 MG: 40 CAPSULE ORAL at 15:42

## 2021-12-17 RX ADMIN — LIDOCAINE HYDROCHLORIDE 100 MG: 20 INJECTION, SOLUTION INFILTRATION; PERINEURAL at 07:30

## 2021-12-17 ASSESSMENT — MIFFLIN-ST. JEOR: SCORE: 1527.87

## 2021-12-17 NOTE — DISCHARGE INSTRUCTIONS
[facility]  Home Care Following Anesthesia  For 24 hours after surgery:  1. Get plenty of rest.  A responsible adult must stay with you for at least 24 hours after you leave the surgery center.  2. Do not drive or use heavy equipment.  If you have weakness or tingling, don't drive or use heavy equipment until this feeling goes away.   3. Do not drink alcohol.   4. Avoid strenuous or risky activities.  Ask for help when climbing stairs.  5. You may feel lightheaded.  IF so, sit for a few minutes before standing.  Have someone help you get up.   6. If you have nausea (feel sick to your stomach): Drink only clear liquids such as apple juice, ginger ale, broth or 7-Up.  Rest may also help.  Be sure to drink enough fluids.  Move to a regular diet as you feel able.   7. You may have a slight fever.  Call the doctor if your fever is over 100 F (37.7 C) (taken under the tongue) or lasts longer than 24 hours.  8. You may have a dry mouth, a sore throat, muscle aches or trouble sleeping. These should go away after 24 hours.  9. Do not make important or legal decisions.   10. It is recommended to avoid smoking.               Tips for taking pain medications  To get the best pain relief possible, remember these points:    Take pain medications as directed, before pain becomes severe.    Pain medication can upset your stomach: taking it with food may help.    Constipation is a common side effect of pain medication. Drink plenty of  fluids.    Eat foods high in fiber. Take a stool softener if recommended by your doctor or pharmacist.    Do not drink alcohol, drive or operate machinery while taking pain medications.    Ask about other ways to control pain, such as with heat, ice or relaxation.    Tylenol/Acetaminophen Consumption  To help encourage the safe use of acetaminophen, the makers of TYLENOL  have lowered the maximum daily dose for single-ingredient Extra Strength TYLENOL   (acetaminophen) products sold in the [place] from 8 pills per day (4,000 mg) to 6 pills per day (3,000 mg). The dosing interval has also changed from 2 pills every 4-6 hours to 2 pills every 6 hours.    If you feel your pain relief is insufficient, you may take Tylenol/Acetaminophen in addition to your narcotic pain medication.     Be careful not to exceed 3,000 mg of Tylenol/Acetaminophen in a 24 hour period from all sources.    If you are taking extra strength Tylenol/acetaminophen (500 mg), the maximum dose is 6 tablets in 24 hours.    If you are taking regular strength acetaminophen (325 mg), the maximum dose is 9 tablets in 24 hours.    Call a doctor for any of the followin. Signs of infection (fever, growing tenderness at the surgery site, a large amount of drainage or bleeding, severe pain, foul-smelling drainage, redness, swelling).  2. It has been over 8 to 10 hours since surgery and you are still not able to urinate (pass water).  3. Headache for over 24 hours.  4. Numbness, tingling or weakness the day after surgery (if you had spinal anesthesia).  5. Signs of Covid-19 infection (temperature over 100 degrees, shortness of breath, cough, loss of taste/smell, generalized body aches, persistent headache, chills, sore throat, nausea/vomiting/diarrhea)  Your doctor is:  Dr. Lashanda Mulligan, Plastic Surgery: [phone]                    Or dial [phone] and ask for the resident on call for:  Plastics  For emergency care, call the:  [facility]:  [phone] (TTY for hearing impaired: [phone])    Caring for Your Facundo-Garcia Drain    You have been discharged with a Facundo-Garcia drainage tube. This tube drains fluid from your incision, helping prevent swelling and reducing the risk for infection. The tube is held in place by a few stitches. The drain will be removed when your doctor determines you no longer need it and when the amount of drainage decreases. The color and  amount of fluid varies. Right after surgery, the fluid may be bright red and may become clearer over time.   Dressing:    Keep the skin around the tube dry.    If you have a dressing, change it every day.   o Wash your hands.  o Remove the old bandage. Do not use scissors-you may accidentally cut the tube.  o Check for any redness, swelling, drainage, or broken stitches. (Call your doctor with any of these findings).  o Wash your hands again.  o Wet a cotton swab (Q-tip) and clean around the incision and the tube site. Use normal saline solution (salt and water) or soap and water. Start at the tube site and move outward in a circular motion.   o Pat dry.  o Put a new bandage on the incision and tube site. Make the bandage large enough to cover the whole incision area.   o Tape the bandage in place.  o Throw out old materials and wash your hands.   Home Care:    Tape the tube to the skin below the bandage. Make sure to keep some slack in the tube to keep it from pulling out.     DO NOT sleep on the same side as the tube.    Secure the tube and bulb inside your clothing with a safety pin. This helps keep the tube from being pulled out.     Keep the bulb compressed at all times, except when you empty it.    Empty your drain at least twice a day. Empty it more often if the drain is full.   o Lift the opening of the drain.  o Drain the fluid into a measuring cup.  o Record the amount of fluid each time you empty. Share the information with your doctor at your follow-up visit.   o Squeeze the bulb with your hands until you hear air coming out of the bulb.  o Close the opening.     Tape plastic wrap over the bandage and tube site when you shower.      Stripping  the tube helps keep blood clots from blocking the tube.                         ONLY DO THIS IF YOUR DOCTOR INSTRUCTED YOU TO DO SO!  o Hold the tubing where it leaves the skin with one hand. This keeps it from pulling on the skin.  o Pinch the tubing with the  thumb and first finger of your other hand.   o Slowly and firmly pull your thumb and first finger down the tube (squeezing the tube between your fingers). Keep squeezing the tube as you run your fingers towards the bulb. If the pulling hurts or feels like it is coming out of the skin, STOP. Begin again more gently.  o Let go of the tubing with both hands. If the tube is still blocked, repeat these steps three or four times. Make sure that the bulb is compressed so it creates suction.  When to call your doctor:    New or increased pain around the tube    Redness, warmth, or swelling around the incision or tube    Drainage that is foul smelling    Vomiting    Fever over 101 F degrees    Fluid leaking around the tube    Incision seems not to be healing    Stitches become loose    The tube falls out    Drainage that changes from light pick to dark red    A sudden increase or decrease in the amount of drainage (over 30 ml).  Your drainage record:  Date Time Bulb 1: Amount of drainage (ml or cc) Bulb 2: Amount of drainage (ml or cc) Notes                                                                                              ON-Q  C-bloc Continuous Nerve Block Discharge Instructions    The Nerve Block    Your anesthesiologist performed a nerve block (a procedure that blocks pain to only a specific area) by inserting a small catheter (tube) in your body.  This catheter is connected to tubing and to a pump that will help control your pain.  The Medicine/Rate______________________________________________    The pump is shaped like a balloon and is filled with     medicine that causes numbness or loss of sensation to help control your pain.  The pain pump DOES NOT contain narcotics.      The medicine in the pump may alter your ability to feel changes in temperature or pressure.  Depending on where the catheter was placed, it may affect your ability to control movement.  After the first few hours you may get some soreness  as well as movement back; this is normal.  The Pump      DO NOT SQUEEZE THE PUMP.     The pump delivers medicine at a very slow rate.    You will NOT see the medicine moving through the tubing.    As the medicine is delivered, the pump ball will slowly become smaller.    It may take a day or so before you notice a change in the size and look of the pump.    Depending on the size of your pump, it may take 2 - 5 days to give all the medicine.    The middle part of the pump may look like an apple core when empty.      Photo used with permission from [employer].    Managing Your Pain    The continuous nerve block infusion may not block all of the pain from your surgery (and the benefits of the pump can vary from patient to patient).  It is important that you take the pain medicines prescribed by your surgeon if you need them.      If you continue to have difficulty with your pain control, please page or call the anesthesiologist.  Caring for Your Pump at Home    Wear the pump on the outside of clothing - away from your skin and cold therapy (ice packs).  The delivery rate is accurate only at room temperature.    Make sure the white clamp on the tubing remains open (moves freely on the tubing).    Make sure there are no kinks in the tubing.    DO NOT tape or cover up the filter.    Protect the pump from sunlight and heat.    When sleeping:   o DO NOT place the pump underneath the bed covers where the pump may become too warm.  o DO NOT place the pump on the floor or hang the pump on a bed post as these situations may cause the tubing to get tangled and get pulled out.    Bathing/Showering:    o We recommend taking sponge baths until the pump is removed.  o Avoid getting the area where the catheter enters your body wet.  o DO NOT let water get in the filter.  o DO NOT submerge the pump in water.  The Infusion    The infusion will be started by the Surgical Center.  DO NOT turn the infusion off unless your  anesthesiologist has told you to do so.    DO NOT change the flow rate of the infusion unless your anesthesiologist told you to do so.  Changing the flow rate without your doctor s instruction may result in the wrong dose of medicine, which could cause serious injury.    If your anesthesiologist told you to change the rate of your infusion:  o Flip open the clear cover on the select-a-flow device.  o Turn the white key clockwise on the select-a-flow dial to the instructed rate.  (The rate of the infusion should line up with the black arrow at the top of the controller.)    o Listen for the click when you move the dial.  o The key may be removed from the select-a-flow dial, or the plastic cover on the device may be zip tied shut to ensure safety.      Photo used with permission from [employer].    Activity     DO NOT drive or operate heavy machinery if the nerve block affects an extremity    DO NOT bear weight on the affected extremity until sensation and motion return and directed by your physician    Elevate affected extremity; pillows work well for elevation.    Take care to avoid objects that may put pressure on or cause trauma to the limb.  Be careful when placing hot or cold objects on the numb area.    For Upper Extremity Nerve Blocks, using the non-operative extremity, you may do range of motion exercises hourly while awake unless directed otherwise.    For Knee Surgery patients with a Femoral or Adductor Canal catheter you must have an Immobilizer on at all times when up until the catheter is removed and full sensation and strength have returned.    For Lower Extremity Nerve Blocks make sure someone is with you the first time you attempt to place full weight on your operative side.  Removing the Catheter    When the pump is empty or if you have been told to stop the infusion you can remove the catheter.  Follow these steps::  o Wash your hands.  o Clamp the tubing (squeeze the white clamp until you hear  or feel a click).  o Remove the clear dressing that covers the tubing.  o Grasp the catheter close to the skin and gently pull.  If you meet resistance, STOP pulling and page or call your anesthesiologist.  o DO NOT cut or forcefully remove the catheter.  o After removal, check the end of the catheter for a dark tip.  If you DO NOT see a dark tip, page or call your anesthesiologist.  o Apply firm pressure over the site until oozing stops.  Wash the area with soap and water, dry with a clean towel and then cover with a bandage.   o The pump is not reusable or refillable.  Dispose of it in the trash and wash your hands.  Troubleshooting    Tubing Comes Out From Skin:  If the tube accidentally comes out, check the end of the tubing for a dark tip.    If you see a dark tip simply discard it and use the pain pills prescribed to you by your surgeon.    If you DON T see a dark tip, page or call the anesthesiologist.    Tubing Disconnection:  If the tubing accidentally becomes disconnected from the pump, DO NOT reconnect the pump to the tubing.  It may have been contaminated with germs.  Close the tubing clamp and immediately page or call your anesthesiologist.    Fluid Leaking:  If fluid is leaking from the site catheter insertion site, close the white clamp on the tubing and page or call your anesthesiologist.    Immediately report the following to your anesthesiologist:    Redness, warmth, swelling, or tenderness at the site the tubing was inserted    Increase in pain    Fever, chills, sweats    Bowel or bladder changes    Difficulty breathing    Dizziness, lightheadedness    Blurred vision    Ringing or buzzing in your ears    Metal taste in your mouth    Numbness and/or tingling around your mouth, fingers or toes    Drowsiness    Confusion    Trouble removing the tubing    Dark tip is not present when tubing is removed      Notifying your Anesthesiologist  o Page:  Dial [phone], then enter [phone].  You will be  prompted to enter your phone number and then the # sign.  The anesthesiologist will call you back.  o Call:  Dial [phone].  Ask to speak to the anesthesiologist on call for the [facility].

## 2021-12-17 NOTE — ANESTHESIA POSTPROCEDURE EVALUATION
Patient: Elva Aaron    Procedure: Procedure(s):  MASTECTOMY, BILATERAL, SIMPLE, WITH nipple grafts. OnQ       Diagnosis:Gender dysphoria in adolescent and adult [F64.0]  Diagnosis Additional Information: No value filed.    Anesthesia Type:  General    Note:  Disposition: Outpatient   Postop Pain Control: Uneventful            Sign Out: Well controlled pain   PONV: No   Neuro/Psych: Uneventful            Sign Out: Acceptable/Baseline neuro status   Airway/Respiratory: Uneventful            Sign Out: Acceptable/Baseline resp. status   CV/Hemodynamics: Uneventful            Sign Out: Acceptable CV status; No obvious hypovolemia; No obvious fluid overload   Other NRE: NONE   DID A NON-ROUTINE EVENT OCCUR? No           Last vitals:  Vitals Value Taken Time   /67 12/17/21 1345   Temp 36.2  C (97.1  F) 12/17/21 1345   Pulse 85 12/17/21 1356   Resp 13 12/17/21 1356   SpO2 96 % 12/17/21 1356   Vitals shown include unvalidated device data.    Electronically Signed By: Marcelo Strong MD, MD  December 17, 2021  1:57 PM

## 2021-12-17 NOTE — BRIEF OP NOTE
[facility]    Brief Operative Note    Pre-operative diagnosis: Gender dysphoria in adolescent and adult [F64.0]  Post-operative diagnosis Same as pre-operative diagnosis    Procedure: Procedure(s):  MASTECTOMY, BILATERAL, SIMPLE, WITH nipple grafts. OnQ  Surgeon: Surgeon(s) and Role:     * Lashanda Mulligan MD - Primary    Resident: Ayde Dennison PGY1 MD   Anesthesia: General   Estimated Blood Loss: 200 ml  Drains: Facundo-Garcia x2 L and R chest.   Left breast: 1217g removed  Right breast 1090g removed   900 mL UOP  1400IV fluid received     Specimens:   ID Type Source Tests Collected by Time Destination   1 : Left breast tissue Mastectomy, Simple Breast, Left SURGICAL PATHOLOGY EXAM, RESEARCH SPECIMEN FOR BIONET TESTING Lashanda Mulligan MD 12/17/2021 12:04 PM    2 : Right breast tissue Mastectomy, Simple Breast, Right SURGICAL PATHOLOGY EXAM, RESEARCH SPECIMEN FOR BIONET TESTING Lashanda Mulligan MD 12/17/2021 12:04 PM      Findings:   None.  Complications: None.  Implants: * No implants in log *     Ayde Dennison PGY1 Plastic Surgery

## 2021-12-17 NOTE — ANESTHESIA CARE TRANSFER NOTE
Patient: Elva Aaron    Procedure: Procedure(s):  MASTECTOMY, BILATERAL, SIMPLE, WITH nipple grafts. OnQ       Diagnosis: Gender dysphoria in adolescent and adult [F64.0]  Diagnosis Additional Information: No value filed.    Anesthesia Type:   General     Note:    Oropharynx: oropharynx clear of all foreign objects and spontaneously breathing  Level of Consciousness: drowsy  Oxygen Supplementation: nasal cannula  Level of Supplemental Oxygen (L/min / FiO2): 3  Independent Airway: airway patency satisfactory and stable  Dentition: dentition unchanged  Vital Signs Stable: post-procedure vital signs reviewed and stable  Report to RN Given: handoff report given  Patient transferred to: PACU    Handoff Report: Identifed the Patient, Identified the Reponsible Provider, Reviewed the pertinent medical history, Discussed the surgical course, Reviewed Intra-OP anesthesia mangement and issues during anesthesia, Set expectations for post-procedure period and Allowed opportunity for questions and acknowledgement of understanding      Vitals:  Vitals Value Taken Time   /70 12/17/21 1319   Temp 35.6  C (96  F) 12/17/21 1318   Pulse 91 12/17/21 1324   Resp 16 12/17/21 1318   SpO2 100 % 12/17/21 1324   Vitals shown include unvalidated device data.    Electronically Signed By: EMILIANO Early CRNA  December 17, 2021  1:26 PM

## 2021-12-18 ENCOUNTER — TELEPHONE (OUTPATIENT)
Dept: SURGERY | Facility: CLINIC | Age: 26
End: 2021-12-18
Payer: COMMERCIAL

## 2021-12-18 NOTE — TELEPHONE ENCOUNTER
Spoke to caretaker Chandler at [phone] regarding Zed not able to void after surgery. States since surgery around 1PM yesterday has not been able to void despite feeling urge to. Otherwise doing well without any systemic symptoms. Chest site okay. Not currently in significant discomfort due to retention.     Given presentation, this is likely just urinary retention secondary to anesthesia from surger. Recommended ambulation and continued attempt at urination at this time. If continues to not be able to void over next hour or so or with increasing discomfort, recommend presenting to urgent care or ED for straight cath for urinary retention. They demonstrated understanding of the situation and agree with plan.     Jerrod Greer MD   Surgery PGY-6  Please Page On-Call on [facility]

## 2021-12-21 LAB
PATH REPORT.COMMENTS IMP SPEC: NORMAL
PATH REPORT.COMMENTS IMP SPEC: NORMAL
PATH REPORT.FINAL DX SPEC: NORMAL
PATH REPORT.GROSS SPEC: NORMAL
PATH REPORT.MICROSCOPIC SPEC OTHER STN: NORMAL
PATH REPORT.RELEVANT HX SPEC: NORMAL
PHOTO IMAGE: NORMAL

## 2021-12-21 PROCEDURE — 88305 TISSUE EXAM BY PATHOLOGIST: CPT | Mod: 26 | Performed by: PATHOLOGY

## 2021-12-28 NOTE — OP NOTE
Procedure Date: 12/17/2021    ATTENDING SURGEON:  Lashanda Mulligan MD    RESIDENT SURGEON:  Ayde Dennison, PGY-1    ASSISTANT:  Jocelynn Chapman MS-3    PREOPERATIVE DIAGNOSIS:  Gender dysphoria.    POSTOPERATIVE DIAGNOSIS:  Gender dysphoria.    PROCEDURES PERFORMED:  Bilateral simple mastectomies with nipple graft reconstruction.  On-Q catheter placement.    ANESTHESIA:  GET.    ESTIMATED BLOOD LOSS:  200 mL.    INTRAVENOUS FLUIDS:  1400 mL.    URINE OUTPUT:  900 mL.    COUNTS:  Correct.    COMPLICATIONS:  None.    DRAINS:  MAIK x2.    SPECIMENS:  Right breast 1098 grams, left breast 1217 grams.    INDICATIONS FOR PROCEDURE:  This is a 26-year-old biological female with diagnosis of gender dysphoria.  They met [facility] and insurance criteria for gender-affirming top surgery.  Due to the patient's breast volume and ptosis, they would require double-incision approach to their mastectomy.  They desired creation of a nipple graft.    DESCRIPTION OF PROCEDURE:  The patient was seen in the preoperative waiting area.  The operative sites were marked.  This included sternal notch, sternal midline, inframammary folds with possible extension laterally for dog ears, vertical line through the nipple-areolar complex, medial and lateral borders of the breast footplate, palpable inferior border of the pectoralis major origin and a transverse line transposed from the mid humerus.  Informed consent was obtained after reviewing possible risks and complications, including but not limited to the following:  Infection, bleeding, hematoma/seroma formation, poor healing, possible partial or complete loss of nipple graft, possible dehiscence, possible spitting sutures, possible hypertrophic scarring, possible injury to surrounding neurovascular/musculoskeletal structures including intrathoracic and interaxillary structures, possible asymmetries and residual deformities, possible need for further surgery, possible altered sensation of the chest wall,  either hypo or hypersensitivity, and possible anesthetic risks such as DVT, PE and cardiopulmonary arrest.  The patient was then brought to the operating room and placed supine on the OR table.  After general anesthesia was administered and the patient was intubated, A Hendrix was placed.  The patient already had sequential compression devices on the lower extremities prior to induction.  Arms were secured to arm boards laterally at 90 degrees from the table.  Thighs and forelegs were supported on pillows and secured with padded safety straps.  A lower Shereen Hugger was in place.  The patient was then put into a sitting position and adjustments were made for symmetry.  The chest/breast area was then prepped and draped in the usual sterile fashion using ChloraPrep.     After a timeout was taken and the proper patient and procedure were identified, we made our inframammary fold incisions.  Our incisions were made with a scalpel followed by ice cautery to divide the dermis and leave approximately 2 cm of subcutaneous fat before beveling down to the pectoralis fascia.  The breast mound was elevated off the pectoral fascia superiorly towards the clavicle, medially towards the parasternal border and laterally past the lateral border of the pectoralis major muscle.  This then allowed us to displace the breast mound inferiorly and mariela where it overlapped with the IMF incision.  This was a bit lower than our original preoperative markings, so this was adjusted.  The nipple-areolar complex was then harvested sharply using a 10 blade.  These were wrapped in normal saline-soaked gauze and kept on the back table, marked per side for further processing later.  The breast mound was then amputated with the cautery in a similar fashion.  Additional thinning of the superior skin flaps was also achieved with cautery.  All breast tissue removed was ultimately weighed before sending to Pathology.  Of note, when we were contouring our  superior skin flap, the left side of the chest had very prominent costochondral junctions in 2 places.  We were able to somewhat camouflage this was with the thickness of our flap.  Once we had achieved relative symmetry with regard to contour, the incisions were temporarily skin-stapled and the patient was put into a sitting position.  This allowed us to make further adjustments with regard to our level and also shape and lateral extension of our incisions.  These were marked and then further excised.  When we were happy with contour and symmetry, the dissection pockets were irrigated with antibiotic saline solution.  Hemostasis was achieved with cautery.  A #15 round MAIK drain was introduced through a separate stab wound incision laterally and secured with 3-0 nylon suture.  Dual 10-inch On-Q catheters were percutaneously introduced from the epigastric region, draped along the superior pocket.  These were secured with benzoin and Tegaderm.  The closure was then achieved with 3-0 Vicryl deep dermal buried sutures followed by 4-0 Vicryl running subcuticular suture.  Just before we ran the running subcutaneous suture, the patient was put back into a sitting position to check for any final touchups for symmetry.  Once the incision was closed, we then turned our attention to the nipple graft reconstruction.  With the patient in sitting position, a template was made of approximately 1.5 cm in diameter.  These were used to localize the most aesthetic-appealing position for nipple grafting.  These were then de-epithelialized sharply with a 15-blade.  In the meanwhile, the nipple-areolar complexes were aggressively thinned off on the back table.  This was to facilitate graft take.  Excess areola and nipple tissue was trimmed as needed to fit our recipient site.  This was then anchored with 5-0 fast absorbing gut interrupted and running cuticular sutures.  The nipple was also anchored centrally.  The graft was pie-crusted  with an 18-gauge needle.  Bolster dressing consisting of Xeroform sheets with antibiotic-soaked cotton balls was held in place with 2-0 silk tie-overs and staples.  Exofin Dermabond was applied to the incisions.  MAIK drains were trimmed and put to bulb suction.  A dressing of ABDs for drain sponges and a couple Kerlix rolls were unfurled across the anterior chest before wrapping circumferentially with a double-long 6-inch Ace wrap for compression.  The On-Q catheters were attached to the reservoir containing 550 mL of 0.2% ropivacaine to be delivered at 2 mL per hour per catheter for the next 5 days.  The patient was extubated.  Hendrix was discontinued.  The patient was transferred to a stretcher, and taken to the recovery room in satisfactory condition, having tolerated the procedure without difficulty or complication.     Final weights were as follows:  Right breast 1098 grams, left breast 1217 grams.    Lashanda Mulligan MD        D: 2021   T: 2021   MT: [id number]    Name:     KAYLEY KINNEY  MRN:      [id number]        Account:        [id number]   :      1995           Procedure Date: 2021     Document: [id number]

## 2022-01-25 ENCOUNTER — OFFICE VISIT (OUTPATIENT)
Dept: PLASTIC SURGERY | Facility: CLINIC | Age: 27
End: 2022-01-25
Payer: COMMERCIAL

## 2022-01-25 VITALS
SYSTOLIC BLOOD PRESSURE: 118 MMHG | HEART RATE: 102 BPM | DIASTOLIC BLOOD PRESSURE: 83 MMHG | TEMPERATURE: 97.8 F | OXYGEN SATURATION: 98 %

## 2022-01-25 DIAGNOSIS — Z90.13 S/P BILATERAL MASTECTOMY: Primary | ICD-10-CM

## 2022-01-25 PROCEDURE — 99024 POSTOP FOLLOW-UP VISIT: CPT | Performed by: SURGERY

## 2022-01-25 ASSESSMENT — PAIN SCALES - GENERAL: PAINLEVEL: NO PAIN (0)

## 2022-01-25 NOTE — NURSING NOTE
Chief Complaint   Patient presents with     Surgical Followup     6 week post-op -- DOS 12/17       Vitals:    01/25/22 1158   BP: 118/83   Pulse: 102   Temp: 97.8  F (36.6  C)   TempSrc: Oral   SpO2: 98%       There is no height or weight on file to calculate BMI.          ZULMA YODER EMT

## 2022-01-25 NOTE — PROGRESS NOTES
PLASTICS POST-OP  This is a 26 year old trans male with a history of gender dysphoria who presents 6 weeks post-op after a bilateral simple mastectomy with nipple graft reconstruction on 12/17/2021. He is here today by himself. Patient does not have any concerns today. He states having some lateral tightness. ROM is 95%. He has been wondering when he would be able to help around the house by lifting more weight. Patient states he has some widening along scars. He has been waiting to start scar treatment, but would like to start Vitamin E and coconut oil. Patient reports sensation retuning. He states he had enough narcotics and stopped taking them around the third day. Patient is very happy with how he looks, and he walks around without a shirt on at home.     PE: Chest: Nice upper chest contour.   Good symmetry.   Incisions touch at midline.  Minor hypertrophy along R medial incision.    Widening along incisions L>R.  R NG > L NG and minimal scabbing.  Very tiny dog ears posteriorly.   Spitting sutures along incisions, mainly L.  Photos taken with verbal consent.     A&P: 26 year old trans male who presents 6 weeks post-op after a bilateral simple mastectomy with nipple graft conservation on 12/17/2021.     Overall Zed is healing well. He can gradually increase activity as tolerated.     We discussed scar reducing techniques, such as Mederma, silicone strips, vitamin E oil, emu oil, massage and when he may begin using these.     He will follow up 6-12 months post-op. Causes for returning sooner were discussed.     Total time = 15 minutes, spent on the date of encounter doing chart review, history and physical, dressing changes, documentation, patient education, and any further activity as noted above.     This note was prepared on behalf of Lashanda Mulligan MD by Marce Delacruz, a trained medical scribe, based on my observations and the provider's statements to me.

## 2022-01-25 NOTE — LETTER
1/25/2022       RE: Elva Aaron  [address]  [place] [address]     Dear Colleague,    Thank you for referring your patient, Elva Aaron, to the [facility] [place] at [facility]. Please see a copy of my visit note below.    PLASTICS POST-OP  This is a 26 year old trans male with a history of gender dysphoria who presents 6 weeks post-op after a bilateral simple mastectomy with nipple graft reconstruction on 12/17/2021. He is here today by himself. Patient does not have any concerns today. He states having some lateral tightness. ROM is 95%. He has been wondering when he would be able to help around the house by lifting more weight. Patient states he has some widening along scars. He has been waiting to start scar treatment, but would like to start Vitamin E and coconut oil. Patient reports sensation retuning. He states he had enough narcotics and stopped taking them around the third day. Patient is very happy with how he looks, and he walks around without a shirt on at home.     PE: Chest: Nice upper chest contour.   Good symmetry.   Incisions touch at midline.  Minor hypertrophy along R medial incision.    Widening along incisions L>R.  R NG > L NG and minimal scabbing.  Very tiny dog ears posteriorly.   Spitting sutures along incisions, mainly L.  Photos taken with verbal consent.     A&P: 26 year old trans male who presents 6 weeks post-op after a bilateral simple mastectomy with nipple graft conservation on 12/17/2021.     Overall Zed is healing well. He can gradually increase activity as tolerated.     We discussed scar reducing techniques, such as Mederma, silicone strips, vitamin E oil, emu oil, massage and when he may begin using these.     He will follow up 6-12 months post-op. Causes for returning sooner were discussed.     Total time = 15 minutes, spent on the date of encounter doing chart  review, history and physical, dressing changes, documentation, patient education, and any further activity as noted above.     This note was prepared on behalf of Lashanda Mulligan MD by Marce Delacruz, a trained medical scribe, based on my observations and the provider's statements to me.       Lashanda Mulligan MD

## 2022-01-30 ENCOUNTER — HEALTH MAINTENANCE LETTER (OUTPATIENT)
Age: 27
End: 2022-01-30

## 2022-06-01 ENCOUNTER — OFFICE VISIT (OUTPATIENT)
Dept: FAMILY MEDICINE | Facility: CLINIC | Age: 27
End: 2022-06-01
Payer: COMMERCIAL

## 2022-06-01 VITALS
OXYGEN SATURATION: 96 % | RESPIRATION RATE: 16 BRPM | TEMPERATURE: 97.9 F | BODY MASS INDEX: 29.32 KG/M2 | WEIGHT: 176 LBS | HEIGHT: 65 IN | HEART RATE: 82 BPM

## 2022-06-01 DIAGNOSIS — F64.9 GENDER DYSPHORIA: Primary | ICD-10-CM

## 2022-06-01 DIAGNOSIS — F90.0 ATTENTION DEFICIT HYPERACTIVITY DISORDER (ADHD), PREDOMINANTLY INATTENTIVE TYPE: ICD-10-CM

## 2022-06-01 DIAGNOSIS — Z23 HIGH PRIORITY FOR 2019-NCOV VACCINE: ICD-10-CM

## 2022-06-01 DIAGNOSIS — R42 DIZZINESS: ICD-10-CM

## 2022-06-01 PROCEDURE — 91305 COVID-19,PF,PFIZER (12+ YRS): CPT | Performed by: STUDENT IN AN ORGANIZED HEALTH CARE EDUCATION/TRAINING PROGRAM

## 2022-06-01 PROCEDURE — 99214 OFFICE O/P EST MOD 30 MIN: CPT | Mod: 25 | Performed by: STUDENT IN AN ORGANIZED HEALTH CARE EDUCATION/TRAINING PROGRAM

## 2022-06-01 PROCEDURE — 0054A COVID-19,PF,PFIZER (12+ YRS): CPT | Performed by: STUDENT IN AN ORGANIZED HEALTH CARE EDUCATION/TRAINING PROGRAM

## 2022-06-01 RX ORDER — NEEDLES, SAFETY 22GX1 1/2"
NEEDLE, DISPOSABLE MISCELLANEOUS
Qty: 50 EACH | Refills: 1 | Status: SHIPPED | OUTPATIENT
Start: 2022-06-01 | End: 2023-12-19

## 2022-06-01 RX ORDER — TESTOSTERONE CYPIONATE 200 MG/ML
80 INJECTION, SOLUTION INTRAMUSCULAR WEEKLY
Qty: 10 ML | Refills: 5 | Status: SHIPPED | OUTPATIENT
Start: 2022-06-01 | End: 2023-12-19

## 2022-06-01 RX ORDER — NEEDLES, DISPOSABLE 25GX5/8"
1 NEEDLE, DISPOSABLE MISCELLANEOUS WEEKLY
Qty: 50 EACH | Refills: 1 | Status: SHIPPED | OUTPATIENT
Start: 2022-06-01 | End: 2023-12-19

## 2022-06-01 RX ORDER — DEXTROAMPHETAMINE SACCHARATE, AMPHETAMINE ASPARTATE MONOHYDRATE, DEXTROAMPHETAMINE SULFATE AND AMPHETAMINE SULFATE 2.5; 2.5; 2.5; 2.5 MG/1; MG/1; MG/1; MG/1
10 CAPSULE, EXTENDED RELEASE ORAL DAILY
Qty: 30 CAPSULE | Refills: 0 | Status: SHIPPED | OUTPATIENT
Start: 2022-07-02 | End: 2022-08-01

## 2022-06-01 RX ORDER — DEXTROAMPHETAMINE SACCHARATE, AMPHETAMINE ASPARTATE MONOHYDRATE, DEXTROAMPHETAMINE SULFATE AND AMPHETAMINE SULFATE 2.5; 2.5; 2.5; 2.5 MG/1; MG/1; MG/1; MG/1
10 CAPSULE, EXTENDED RELEASE ORAL DAILY
Qty: 30 CAPSULE | Refills: 0 | Status: SHIPPED | OUTPATIENT
Start: 2022-08-02 | End: 2023-12-30

## 2022-06-01 RX ORDER — DEXTROAMPHETAMINE SACCHARATE, AMPHETAMINE ASPARTATE MONOHYDRATE, DEXTROAMPHETAMINE SULFATE AND AMPHETAMINE SULFATE 2.5; 2.5; 2.5; 2.5 MG/1; MG/1; MG/1; MG/1
10 CAPSULE, EXTENDED RELEASE ORAL DAILY
Qty: 30 CAPSULE | Refills: 0 | Status: SHIPPED | OUTPATIENT
Start: 2022-06-01 | End: 2022-07-01

## 2022-06-01 ASSESSMENT — PATIENT HEALTH QUESTIONNAIRE - PHQ9: SUM OF ALL RESPONSES TO PHQ QUESTIONS 1-9: 2

## 2022-06-01 NOTE — LETTER
[facility]  06/01/22  Patient: Elva Aaron  YOB: 1995  Medical Record Number: [id number]                                                                                  Non-Opioid Controlled Substance Agreement    This is an agreement between you and your provider regarding safe and appropriate use of controlled substances prescribed by your care team. Controlled substances are?medicines that can cause physical and mental dependence (abuse).     There are strict laws about having and using these medicines. We here at [facility] are  committed to working with you in your efforts to get better. To support you in this work, we'll help you schedule regular office appointments for medicine refills. If we must cancel or change your appointment for any reason, we'll make sure you have enough medicine to last until your next appointment.     As a Provider, I will:     Listen carefully to your concerns while treating you with respect.     Recommend a treatment plan that I believe is in your best interest and may involve therapies other than medicine.      Talk with you often about the possible benefits and the risk of harm of any medicine that we prescribe for you.    Assess the safety of this medicine and check how well it works.      Provide a plan on how to taper (discontinue or go off) using this medicine if the decision is made to stop its use.      ::  As a Patient, I understand controlled substances:       Are prescribed by my care provider to help me function or work and manage my condition(s).?    Are strong medicines and can cause serious side effects.       Need to be taken exactly as prescribed.?Combining controlled substances with certain medicines or chemicals (such as illegal drugs, alcohol, sedatives, sleeping pills, and benzodiazepines) can be dangerous or even fatal.? If I stop taking my medicines suddenly, I may have severe withdrawal symptoms.     The risks,  benefits, and side effects of these medicine(s) were explained to me. I agree that:    1. I will take part in other treatments as advised by my care team. This may be psychiatry or counseling, physical therapy, behavioral therapy, group treatment or a referral to specialist.    2. I will keep all my appointments and understand this is part of the monitoring of controlled substances.?My care team may require an office visit for EVERY controlled substance refill. If I miss appointments or don t follow instructions, my care team may stop my medicine    3. I will take my medicines as prescribed. I will not change the dose or schedule unless my care team tells me to. There will be no refills if I run out early.      4. I may be asked to come to the clinic and complete a urine drug test or complete a pill count. If I don t give a urine sample or participate in a pill count, the care team may stop my medicine.    5. I will only receive controlled substance prescriptions from this clinic. If I am treated by another provider, I will tell them that I am taking controlled substances and that I have a treatment agreement with this provider. I will inform my [facility] care team within one business day if I am given a prescription for any controlled substance by another healthcare provider. My [facility] care team can contact other providers and pharmacists about my use of any medicines.    6. It is up to me to make sure that I don't run out of my medicines on weekends or holidays.?If my care team is willing to refill my prescription without a visit, I must request refills only during office hours. Refills may take up to 3 business days to process. I will use one pharmacy to fill all my controlled substance prescriptions. I will notify the clinic about any changes to my insurance or medicine availability.    7. I am responsible for my prescriptions. If the medicine/prescription is lost, stolen or destroyed, it will  not be replaced.?I also agree not to share controlled substance medicines with anyone.     8. I am aware I should not use any illegal or recreational drugs. I agree not to drink alcohol unless my care team says I can.     9. If I enroll in the [place] Medical Cannabis program, I will tell my care team before my next refill.    10. I will tell my care team right away if I become pregnant, have a new medical problem treated outside of my regular clinic, or have a change in my medicines.     11. I understand that this medicine can affect my thinking, judgment and reaction time.? Alcohol and drugs affect the brain and body, which can affect the safety of my driving. Being under the influence of alcohol or drugs can affect my decision-making, behaviors, personal safety and the safety of others. Driving while impaired (DWI) can occur if a person is driving, operating or in physical control of a car, motorcycle, boat, snowmobile, ATV, motorbike, off-road vehicle or any other motor vehicle ([place] Statute 169A.20). I understand the risk if I choose to drive or operate any vehicle or machinery.    I understand that if I do not follow any of the conditions above, my prescriptions or treatment may be stopped or changed.   I agree that my provider, clinic care team and pharmacy may work with any city, state or federal law enforcement agency that investigates the misuse, sale or other diversion of my controlled medicine. I will allow my provider to discuss my care with, or share a copy of, this agreement with any other treating provider, pharmacy or emergency room where I receive care.     I have read this agreement and have asked questions about anything I did not understand.    ________________________________________________________  Patient Signature - Elva Aaron     ___________________                   Date     ________________________________________________________  Provider Signature - Carina Okeefe MD        ___________________                   Date     ________________________________________________________  Witness Signature (required if provider not present while patient signing)          ___________________                   Date

## 2022-06-01 NOTE — PROGRESS NOTES
Preceptor Attestation:    I discussed the patient with the resident and evaluated the patient in person. I have verified the content of the note, which accurately reflects my assessment of the patient and the plan of care.   Supervising Physician:  Sil Vegas MD.

## 2022-06-01 NOTE — LETTER
[facility]  06/01/22  Patient: Elva Aaron  YOB: 1995  Medical Record Number: [id number]                                                                                  Non-Opioid Controlled Substance Agreement    This is an agreement between you and your provider regarding safe and appropriate use of controlled substances prescribed by your care team. Controlled substances are?medicines that can cause physical and mental dependence (abuse).     There are strict laws about having and using these medicines. We here at [facility] are  committed to working with you in your efforts to get better. To support you in this work, we'll help you schedule regular office appointments for medicine refills. If we must cancel or change your appointment for any reason, we'll make sure you have enough medicine to last until your next appointment.     As a Provider, I will:     Listen carefully to your concerns while treating you with respect.     Recommend a treatment plan that I believe is in your best interest and may involve therapies other than medicine.      Talk with you often about the possible benefits and the risk of harm of any medicine that we prescribe for you.    Assess the safety of this medicine and check how well it works.      Provide a plan on how to taper (discontinue or go off) using this medicine if the decision is made to stop its use.      ::  As a Patient, I understand controlled substances:       Are prescribed by my care provider to help me function or work and manage my condition(s).?    Are strong medicines and can cause serious side effects.       Need to be taken exactly as prescribed.?Combining controlled substances with certain medicines or chemicals (such as illegal drugs, alcohol, sedatives, sleeping pills, and benzodiazepines) can be dangerous or even fatal.? If I stop taking my medicines suddenly, I may have severe withdrawal symptoms.     The risks,  benefits, and side effects of these medicine(s) were explained to me. I agree that:    1. I will take part in other treatments as advised by my care team. This may be psychiatry or counseling, physical therapy, behavioral therapy, group treatment or a referral to specialist.    2. I will keep all my appointments and understand this is part of the monitoring of controlled substances.?My care team may require an office visit for EVERY controlled substance refill. If I miss appointments or don t follow instructions, my care team may stop my medicine    3. I will take my medicines as prescribed. I will not change the dose or schedule unless my care team tells me to. There will be no refills if I run out early.      4. I may be asked to come to the clinic and complete a urine drug test or complete a pill count. If I don t give a urine sample or participate in a pill count, the care team may stop my medicine.    5. I will only receive controlled substance prescriptions from this clinic. If I am treated by another provider, I will tell them that I am taking controlled substances and that I have a treatment agreement with this provider. I will inform my [facility] care team within one business day if I am given a prescription for any controlled substance by another healthcare provider. My [facility] care team can contact other providers and pharmacists about my use of any medicines.    6. It is up to me to make sure that I don't run out of my medicines on weekends or holidays.?If my care team is willing to refill my prescription without a visit, I must request refills only during office hours. Refills may take up to 3 business days to process. I will use one pharmacy to fill all my controlled substance prescriptions. I will notify the clinic about any changes to my insurance or medicine availability.    7. I am responsible for my prescriptions. If the medicine/prescription is lost, stolen or destroyed, it will  not be replaced.?I also agree not to share controlled substance medicines with anyone.     8. I am aware I should not use any illegal or recreational drugs. I agree not to drink alcohol unless my care team says I can.     9. If I enroll in the [place] Medical Cannabis program, I will tell my care team before my next refill.    10. I will tell my care team right away if I become pregnant, have a new medical problem treated outside of my regular clinic, or have a change in my medicines.     11. I understand that this medicine can affect my thinking, judgment and reaction time.? Alcohol and drugs affect the brain and body, which can affect the safety of my driving. Being under the influence of alcohol or drugs can affect my decision-making, behaviors, personal safety and the safety of others. Driving while impaired (DWI) can occur if a person is driving, operating or in physical control of a car, motorcycle, boat, snowmobile, ATV, motorbike, off-road vehicle or any other motor vehicle ([place] Statute 169A.20). I understand the risk if I choose to drive or operate any vehicle or machinery.    I understand that if I do not follow any of the conditions above, my prescriptions or treatment may be stopped or changed.   I agree that my provider, clinic care team and pharmacy may work with any city, state or federal law enforcement agency that investigates the misuse, sale or other diversion of my controlled medicine. I will allow my provider to discuss my care with, or share a copy of, this agreement with any other treating provider, pharmacy or emergency room where I receive care.     I have read this agreement and have asked questions about anything I did not understand.    ________________________________________________________  Patient Signature - Elva Aaron     ___________________                   Date     ________________________________________________________  Provider Signature - Carina Okeefe MD        ___________________                   Date     ________________________________________________________  Witness Signature (required if provider not present while patient signing)          ___________________                   Date

## 2022-06-01 NOTE — PATIENT INSTRUCTIONS
"Here is the plan from today's visit    Please come back for a 1 month lab only visit.     1. Gender dysphoria  - Needle, Disp, (BD DISP NEEDLE) 23G X 1\" MISC; 1 Units once a week  Dispense: 50 each; Refill: 1  - Needle, Disp, (BD SAFETYGLIDE NEEDLE) 27G X 5/8\" MISC; USE ONE NEEDLE ONCE WEEKLY  Dispense: 50 each; Refill: 1  - syringe, disposable, 1 ML MISC; 1 Units once a week  Dispense: 60 each; Refill: 1  - testosterone cypionate (DEPOTESTOSTERONE) 200 MG/ML injection; Inject 0.4 mLs (80 mg) into the muscle once a week Inject 0.4mLs (80mg) into the muscle once a week.  Dispense: 10 mL; Refill: 5    2. Attention deficit hyperactivity disorder (ADHD), predominantly inattentive type  - Urine Drugs of Abuse Screen Panel 13; Future  - amphetamine-dextroamphetamine (ADDERALL XR) 10 MG 24 hr capsule; Take 1 capsule (10 mg) by mouth daily  Dispense: 30 capsule; Refill: 0  - amphetamine-dextroamphetamine (ADDERALL XR) 10 MG 24 hr capsule; Take 1 capsule (10 mg) by mouth daily  Dispense: 30 capsule; Refill: 0  - amphetamine-dextroamphetamine (ADDERALL XR) 10 MG 24 hr capsule; Take 1 capsule (10 mg) by mouth daily  Dispense: 30 capsule; Refill: 0    3. High priority for 2019-nCoV vaccine  - COVID-19,PF,PFIZER (12+ Yrs GRAY LABEL)    Please call or return to clinic if your symptoms don't go away.    Follow up plan  Return in about 3 months (around 9/1/2022) for Follow up ADHD + HRT.     Thank you for coming to [facility] today.  Lab Testing:  **If you had lab testing today and your results are reassuring or normal they will be mailed to you or sent through [facility] within 7 days. Please call us at [phone] if you do not receive your results within 2 weeks.   **If the lab tests need quick action we will call you with the results. The phone number we will call with results is # [phone] (home) . If this is not the best number please call our clinic and change the number.  Medication Refills:  If you need any refills " please call your pharmacy and they will contact us.   If you need to  your refill at a new pharmacy, please contact the new pharmacy directly. The new pharmacy will help you get your medications transferred faster.   Scheduling:  If you have any concerns about today's visit or wish to schedule another appointment please call our office during normal business hours [phone] (8-5:00 M-F)   Referrals: If you do not get a call from central scheduling, please refer to directions on your visit summary or call our office during normal business hours for assistance.    Mammogram Scheduling [phone]   XRay/CT/Ultrasound/MRI Scheduling [phone]  [facility] has ultrasound appointments available on Wednesdays. If you would like your ultrasound at [facility], please call [phone] to schedule.   Medical Concerns:  If you have urgent medical concerns please call [phone] at any time of the day.    Carina Okeefe MD

## 2022-06-01 NOTE — PROGRESS NOTES
"  Assessment & Plan     Gender dysphoria  Patient has not been able to take his testosterone for the last 5-6 months.  As such he needs refills of his testosterone as well as equipment.  Provided today.  Will restart his medication at his previous dosing level.  Plan to do a midcycle draw in 1 month.  We will follow-up as needed after that draw for appropriate medication adjustment.  However, if all looks good we will plan on follow-up in 3 months for HRT follow-up.  - Needle, Disp, (BD DISP NEEDLE) 23G X 1\" MISC  Dispense: 50 each; Refill: 1  - Needle, Disp, (BD SAFETYGLIDE NEEDLE) 27G X 5/8\" MISC  Dispense: 50 each; Refill: 1  - syringe, disposable, 1 ML MISC  Dispense: 60 each; Refill: 1  - testosterone cypionate (DEPOTESTOSTERONE) 200 MG/ML injection  Dispense: 10 mL; Refill: 5  - Lipid panel  - Hepatic panel  - Glucose  - Testosterone total  - CBC with platelets    Attention deficit hyperactivity disorder (ADHD), predominantly inattentive type  Patient has essentially run out of his ADHD medication about 5 months ago.  Has not been able to come in.  Today we did an updated drug agreement as well as ordered a UDS as he does not have one on file for the last year.  Refilled his Adderall at 10 mg, which he says works well for him, for a month supply x3.  Plan to follow-up in 3 months for ADHD along with the above HRT.  - Urine Drugs of Abuse Screen Panel 13  - amphetamine-dextroamphetamine (ADDERALL XR) 10 MG 24 hr capsule  Dispense: 30 capsule; Refill: 0  - amphetamine-dextroamphetamine (ADDERALL XR) 10 MG 24 hr capsule  Dispense: 30 capsule; Refill: 0  - amphetamine-dextroamphetamine (ADDERALL XR) 10 MG 24 hr capsule  Dispense: 30 capsule; Refill: 0    High priority for 2019-nCoV vaccine  - COVID-19,PF,PFIZER (12+ Yrs GRAY LABEL)    Dizziness  Given patient reported history of previous dizzy spells occurring while still having heavy menstrual cycles and the most recent 1 after his surgery, suspect that the " "dizziness is associated with anemia versus iron deficiency that has since recovered.  Reassured by the fact he has not had an episode in 5 months.  Orthostatics negative.  We will get a ferritin to assess if he currently needs iron supplementation.  And TSH to rule out hyperthyroidism.  Given he has not had an episode since his mastectomy, will time these labs with his testosterone lab draw.  - Ferritin  - TSH with free T4 reflex    Return in about 3 months (around 9/1/2022) for Follow up ADHD + HRT.    Carina Okeefe MD  [facility] AYANNA Friedman is a 26 year old who presents for the following health issues     HPI     HRT  - been on for a year  - feeling good on the dose that he has  - last shot was 12/2021    ADHD  - Last seen 8/2021   - Takes the meds every day  - Last took medication around 12/2021  - 10 mg extended release worked well for him    Dizzy Spells  - first started getting them in sophomore/diaz year of high school   - vision would tunnel, feel \"dizzy,\" legs would buckle; feel dazed for a second; nauseous, clamy   - would occur once or twice a month   - did have problems with anemia and low iron  - stopped for a few years  - a week after surgery had another episode while boyfriend was changing bandages   - had just had drain pulled the day before; boyfriend was changing the drain bandages   - tunnel vision, \"dizzy,\" needed to lay down immediately or else will like he need to throw up   - did not throw up   - boyfriend states he was out of a second and then was back  - Had as a child; became more frequent after recent surgery  - Mastectomy 12/17/2021  - When he stands up too quickly, will see stars; less with testosterone  - no associated heart racing or chest pain with these episodes  - currently gets spotting with testosterone even with the hiatus         Objective    Pulse 82   Temp 97.9  F (36.6  C) (Oral)   Resp 16   Ht 1.651 m (5' 5\")   Wt 79.8 kg (176 lb)   SpO2 " 96%   BMI 29.29 kg/m    Body mass index is 29.29 kg/m .   Orthostatic vitals:   BP  HR   Lay 110/74  86  Sit 111/77  80  Stand 111/80  91    Physical Exam  Vitals reviewed.   Constitutional:       Appearance: Normal appearance.   HENT:      Head: Normocephalic and atraumatic.   Eyes:      Conjunctiva/sclera: Conjunctivae normal.   Cardiovascular:      Rate and Rhythm: Normal rate.   Pulmonary:      Effort: Pulmonary effort is normal.   Skin:     General: Skin is warm.      Capillary Refill: Capillary refill takes less than 2 seconds.   Neurological:      General: No focal deficit present.      Mental Status: He is alert. Mental status is at baseline.   Psychiatric:         Mood and Affect: Mood normal.         Behavior: Behavior normal.         Thought Content: Thought content normal.         Judgment: Judgment normal.

## 2022-06-21 ENCOUNTER — OFFICE VISIT (OUTPATIENT)
Dept: PLASTIC SURGERY | Facility: CLINIC | Age: 27
End: 2022-06-21
Payer: COMMERCIAL

## 2022-06-21 VITALS
OXYGEN SATURATION: 98 % | HEART RATE: 80 BPM | DIASTOLIC BLOOD PRESSURE: 73 MMHG | BODY MASS INDEX: 29.99 KG/M2 | SYSTOLIC BLOOD PRESSURE: 121 MMHG | HEIGHT: 65 IN | TEMPERATURE: 98.3 F | WEIGHT: 180 LBS

## 2022-06-21 DIAGNOSIS — Z90.13 S/P BILATERAL MASTECTOMY: ICD-10-CM

## 2022-06-21 DIAGNOSIS — L91.0 KELOID SCAR: Primary | ICD-10-CM

## 2022-06-21 PROCEDURE — 99213 OFFICE O/P EST LOW 20 MIN: CPT | Performed by: SURGERY

## 2022-06-21 RX ORDER — LIDOCAINE/PRILOCAINE 2.5 %-2.5%
CREAM (GRAM) TOPICAL PRN
Qty: 30 G | Refills: 1 | Status: SHIPPED | OUTPATIENT
Start: 2022-06-21 | End: 2023-12-19

## 2022-06-21 ASSESSMENT — PAIN SCALES - GENERAL: PAINLEVEL: NO PAIN (0)

## 2022-06-21 NOTE — LETTER
6/21/2022       RE: Elva Aaron  [address]  [place] [address]     Dear Colleague,    Thank you for referring your patient, Elva Aaron, to the [facility] PLASTIC AND RECONSTRUCTIVE SURGERY CLINIC [place] at [facility]. Please see a copy of my visit note below.    PLASTICS POST-OP  This is a 26 year old trans male with a history of gender dysphoria who presents about 6 months post-op after a bilateral simple mastectomy with nipple graft reconstruction on 12/17/21. He is OK with his results except for his scarring. He used coconut oil for massaging them, but they have been tender and are increasing in itchiness. The scars also still feel tight, more so on the R side. For this reason, he continues to be cautious with his ROM.     PE: Chest:  Nice upper chest contour.   Good symmetry.   Scars are hypertrophic bilaterally, R>L, anterior > posterior.  Subtly touch in midline.   There is even some hypertrophic scarring of the inferior R nipple graft.   He has old striae involving the infra incisional skin over the lower thorax.   Photos taken with verbal consent.     A&P: 26 year old trans male who presents 6 months post-op after a bilateral simple mastectomy with nipple graft conservation on 12/17.     Overall Zed has good contour and symmetry but is having symptomatic hypertrophic scarring. He can gradually increase activity as tolerated and should be safe to do so, although it is possible that this will increase his hypertrophy.     We discussed scar reducing techniques, such as Mederma, silicone strips, vitamin E oil, emu oil, massage as well as steroid injections and scar revision. The steroids can help with the symptoms of tenderness and itching, and will flatten the scars but may make them more attenuated and wide. We can certainly start with steroids and still do scar excision/revision at a later time if necessary. He was given a  prescription and instructions to use EMLA cream prior to his appointment with our PA for injections. He understands the benefits and risks.     He will follow up 6 months post-op with me after his steroid injections with our PA. Causes for returning sooner were discussed.     Total time = 20 minutes, spent on the date of encounter doing chart review, history and physical, dressing changes, documentation, patient education, and any further activity as noted above.       Sincerely,    Lashanda Mulligan MD

## 2022-06-21 NOTE — NURSING NOTE
"Chief Complaint   Patient presents with     RECHECK     Zed, is being seen today for a 6 month follow up 12/17/21, scar on the right side of the chest is tender, as reported by patient.       Vitals:    06/21/22 1231   BP: 121/73   BP Location: Left arm   Patient Position: Chair   Cuff Size: Adult Regular   Pulse: 80   Temp: 98.3  F (36.8  C)   TempSrc: Oral   SpO2: 98%   Weight: 81.6 kg (180 lb)   Height: 1.651 m (5' 5\")       Body mass index is 29.95 kg/m .      Ryanne Gomez LPN    "

## 2022-06-22 NOTE — PROGRESS NOTES
Plastic Surgery Outpatient Visit    ID: Elder Aaron is a 26 year old male s/p bilateral mastectomy for gender affirmation 12/17/2021 presents today for steroid injection to hypertrophic scars.     S: complains of itching and pain to raised surgical scars. Worse on R than L.     O:  /82   Pulse 100   SpO2 98%    General: NAD  Chest: R anterior chest scar widened and raised. Smaller portion of L anterior chest scar raised under NAC. Scar pink.     A/P:  -risks and benefits of kenalog injection discussed including but not limited to blood sugar issues, hypopigmentation and thinning of skin. Patient agreeable and consent signed.   -kenalog 40 mixed 1:1 with 1% lidocaine WITHOUT epi for a 20mg/ml kenalog dilution. Chest scars cleaned with alcohol wipes. Injections applied to thickened areas of scar. 1.25cc to R and 0.25cc to L. Patient tolerated well.   RTC 6 weeks for second round of steroid injections    Fabiola Girard PA-C  Plastic and Reconstructive Surgery    25 minutes spent on the date of the encounter doing chart review, steroid injection, dressing changes, documentation and further activity as noted above.

## 2022-06-24 ENCOUNTER — OFFICE VISIT (OUTPATIENT)
Dept: PLASTIC SURGERY | Facility: CLINIC | Age: 27
End: 2022-06-24
Payer: COMMERCIAL

## 2022-06-24 VITALS — SYSTOLIC BLOOD PRESSURE: 130 MMHG | HEART RATE: 100 BPM | DIASTOLIC BLOOD PRESSURE: 82 MMHG | OXYGEN SATURATION: 98 %

## 2022-06-24 DIAGNOSIS — L91.0 KELOID SCAR: Primary | ICD-10-CM

## 2022-06-24 PROCEDURE — 11900 INJECT SKIN LESIONS </W 7: CPT | Performed by: PHYSICIAN ASSISTANT

## 2022-06-24 ASSESSMENT — PAIN SCALES - GENERAL: PAINLEVEL: NO PAIN (0)

## 2022-06-24 NOTE — NURSING NOTE
Chief Complaint   Patient presents with     RECHECK     ILK injection       Vitals:    06/24/22 1021   BP: 130/82   Pulse: 100   SpO2: 98%       There is no height or weight on file to calculate BMI.          ZULMA YODER EMT

## 2022-06-25 NOTE — PROGRESS NOTES
PLASTICS POST-OP  This is a 26 year old trans male with a history of gender dysphoria who presents about 6 months post-op after a bilateral simple mastectomy with nipple graft reconstruction on 12/17/21. He is OK with his results except for his scarring. He used coconut oil for massaging them, but they have been tender and are increasing in itchiness. The scars also still feel tight, more so on the R side. For this reason, he continues to be cautious with his ROM.     PE: Chest:  Nice upper chest contour.   Good symmetry.   Scars are hypertrophic bilaterally, R>L, anterior > posterior.  Subtly touch in midline.   There is even some hypertrophic scarring of the inferior R nipple graft.   He has old striae involving the infra incisional skin over the lower thorax.   Photos taken with verbal consent.     A&P: 26 year old trans male who presents 6 months post-op after a bilateral simple mastectomy with nipple graft conservation on 12/17.     Overall Zed has good contour and symmetry but is having symptomatic hypertrophic scarring. He can gradually increase activity as tolerated and should be safe to do so, although it is possible that this will increase his hypertrophy.     We discussed scar reducing techniques, such as Mederma, silicone strips, vitamin E oil, emu oil, massage as well as steroid injections and scar revision. The steroids can help with the symptoms of tenderness and itching, and will flatten the scars but may make them more attenuated and wide. We can certainly start with steroids and still do scar excision/revision at a later time if necessary. He was given a prescription and instructions to use EMLA cream prior to his appointment with our PA for injections. He understands the benefits and risks.     He will follow up 6 months post-op with me after his steroid injections with our PA. Causes for returning sooner were discussed.     Total time = 20 minutes, spent on the date of encounter doing chart  review, history and physical, dressing changes, documentation, patient education, and any further activity as noted above.

## 2022-07-29 NOTE — PROGRESS NOTES
Plastic Surgery Outpatient Visit    ID: Elder Aaron is a 26 year old male s/p bilateral mastectomy for gender affirmation 12/17/2021 presents today for steroid injection to hypertrophic scars. first steroid injection 6/24.     S: overall doing well. Pain and itching have decreased. It used to be constant but are now intermittent/rare. Feels that the right side has softened slightly but not flattened.     O:  /75   Pulse 88   SpO2 96%    General: NAD  Chest: chest incision-right side thickened, pink, raised and soft. Left side slightly thickened centrally and widened, scar pink. R nipple with thickness to inferior edge.     A/P:  -good results from initial steroid injection, will proceed with second round today.   -kenalog 40 mixed 1:1 with 1% lidocaine WITHOUT epi for a 20mg/ml kenalog dilution. Chest scars cleaned with alcohol wipes. Injections applied to thickened areas of scar. 1.5cc to R and 0.25cc to L. Patient tolerated well.   -Has sufficient EMLA for next visit  -RTC 6 weeks for third round of steroid injection    Fabiloa Girard PA-C  Plastic and Reconstructive Surgery    10 minutes spent on the date of the encounter doing chart review, history and physical, documentation and further activity as noted above. 5 minutes spent doing steroid injection to scar.

## 2022-08-02 ENCOUNTER — OFFICE VISIT (OUTPATIENT)
Dept: PLASTIC SURGERY | Facility: CLINIC | Age: 27
End: 2022-08-02
Payer: COMMERCIAL

## 2022-08-02 VITALS — SYSTOLIC BLOOD PRESSURE: 123 MMHG | OXYGEN SATURATION: 96 % | DIASTOLIC BLOOD PRESSURE: 75 MMHG | HEART RATE: 88 BPM

## 2022-08-02 DIAGNOSIS — L91.0 KELOID SCAR: ICD-10-CM

## 2022-08-02 PROCEDURE — 11900 INJECT SKIN LESIONS </W 7: CPT | Performed by: PHYSICIAN ASSISTANT

## 2022-08-02 ASSESSMENT — PAIN SCALES - GENERAL: PAINLEVEL: NO PAIN (0)

## 2022-08-02 NOTE — LETTER
8/2/2022       RE: Elva Aaron  [address] [place] [address]     Dear Colleague,    Thank you for referring your patient, Elva Aaron, to the [facility] PLASTIC AND RECONSTRUCTIVE SURGERY CLINIC [place] at [facility]. Please see a copy of my visit note below.    Plastic Surgery Outpatient Visit    ID: Elder Aaron is a 26 year old male s/p bilateral mastectomy for gender affirmation 12/17/2021 presents today for steroid injection to hypertrophic scars. first steroid injection 6/24.     S: overall doing well. Pain and itching have decreased. It used to be constant but are now intermittent/rare. Feels that the right side has softened slightly but not flattened.     O:  /75   Pulse 88   SpO2 96%    General: NAD  Chest: chest incision-right side thickened, pink, raised and soft. Left side slightly thickened centrally and widened, scar pink. R nipple with thickness to inferior edge.     A/P:  -good results from initial steroid injection, will proceed with second round today.   -kenalog 40 mixed 1:1 with 1% lidocaine WITHOUT epi for a 20mg/ml kenalog dilution. Chest scars cleaned with alcohol wipes. Injections applied to thickened areas of scar. 1.5cc to R and 0.25cc to L. Patient tolerated well.   -Has sufficient EMLA for next visit  -RTC 6 weeks for third round of steroid injection    Fabiola Girard PA-C  Plastic and Reconstructive Surgery    10 minutes spent on the date of the encounter doing chart review, history and physical, documentation and further activity as noted above. 5 minutes spent doing steroid injection to scar.       Drug Administration Record    Drug Name: Kenalog  Dose: 1mL of triamcinolone 40mg/mL, 40mg dose  Route administered: IM  NDC #: Kenalog-40 ([address]-0)  Amount of waste(mL): 0    LOT #: [id number]  SITE: See providers note  : [employer]  EXPIRATION DATE: 01/2024    Drug Administration Record    Drug  Name: Lidocaine  Dose: 5mL of Xylocaine 10mg/mL, 50mg dose  Route administered: IM  NDC #: Xylocaine -- [id number]  Amount of waste(mL):40mL  Reason for waste: Single use vial    LOT #: [id number]  SITE: See providers note  : [employer]  EXPIRATION DATE: 03/26    Antonino Johnson, EMT        Sincerely,    Fabiola Girard PA-C

## 2022-08-02 NOTE — PROGRESS NOTES
Drug Administration Record    Drug Name: Kenalog  Dose: 1mL of triamcinolone 40mg/mL, 40mg dose  Route administered: IM  NDC #: Kenalog-40 ([address]-1)  Amount of waste(mL): 0    LOT #: [id number]  SITE: See providers note  : [employer]  EXPIRATION DATE: 01/2024    Drug Administration Record    Drug Name: Lidocaine  Dose: 5mL of Xylocaine 10mg/mL, 50mg dose  Route administered: IM  NDC #: Xylocaine -- [id number]  Amount of waste(mL):40mL  Reason for waste: Single use vial    LOT #: [id number]  SITE: See providers note  : [employer]  EXPIRATION DATE: 03/26    MERLYN iPckett

## 2022-08-02 NOTE — NURSING NOTE
Chief Complaint   Patient presents with     RECHECK     ILK injections       Vitals:    08/02/22 1122   BP: 123/75   Pulse: 88   SpO2: 96%       There is no height or weight on file to calculate BMI.          ZULMA YODER EMT

## 2022-09-12 ENCOUNTER — TELEPHONE (OUTPATIENT)
Dept: PLASTIC SURGERY | Facility: CLINIC | Age: 27
End: 2022-09-12

## 2022-09-12 NOTE — TELEPHONE ENCOUNTER
[facility] Call Center    Phone Message    May a detailed message be left on voicemail: yes     Reason for Call: Other: Elder is calling in looking to reschedule his appointment on 9/13, as both of his roommates have tested positive for Covid. TE being sent as per guidelines. Please call back as soon as possible to discuss.     Action Taken: Message routed to:  [facility] ([facility] Care    Travel Screening: Not Applicable

## 2022-09-13 ENCOUNTER — TELEPHONE (OUTPATIENT)
Dept: FAMILY MEDICINE | Facility: CLINIC | Age: 27
End: 2022-09-13

## 2022-09-13 NOTE — TELEPHONE ENCOUNTER
Call transferred to RN    Pt states both roommates have tested positive for COVID. Last night pt developed cough, sore throat and very fatigued. covid test is negative. T 98.2.    Pt taking cough suppressant and tylenol that are effective for a little bit.    Pt would be interested in antiviral treatment if tests positive. RN relayed to do another covid test later today or tomorrow and if tests positive can schedule virtual appt for treatment    Pt denies SOB but dos have some burning chest pain with coughing that lingers. RN relayed if gets worse or unbearable or has SOB to go to ED. Pt verbalized understanding    mychart sent with home remedies. Instructed to keep resting, drink fluids and take tylenol and cough medicine PRN    Rivka Padilla RN

## 2022-09-19 ENCOUNTER — NURSE TRIAGE (OUTPATIENT)
Dept: NURSING | Facility: CLINIC | Age: 27
End: 2022-09-19

## 2022-09-19 NOTE — TELEPHONE ENCOUNTER
Nurse Triage SBAR    Is this a 2nd Level Triage? NO    Situation: COVID, chest pain    Background:   COVID symptoms started on 9/13  Tested positive on 9/15    Assessment:   Constant chest pain started last night  Burning, aching pain  Rated 5/10, reached to 7/10 in a certain position  Constant coughing. Dry cough has worsened. Has ran out of cough suppressant  Fatigue    No SOB    Protocol Recommended Disposition:   Go to ED Now    Recommendation:  ED recommended  FNA tried to phone [place] but staff referred back to FNA's decision    ED    Does the patient meet one of the following criteria for ADS visit consideration? No      Alice Mcneill RN/[place] Nurse Advisor        Reason for Disposition    SEVERE or constant chest pain or pressure  (Exception: Mild central chest pain, present only when coughing.)    Additional Information    Negative: SEVERE difficulty breathing (e.g., struggling for each breath, speaks in single words)    Negative: Difficult to awaken or acting confused (e.g., disoriented, slurred speech)    Negative: Bluish (or gray) lips or face now    Negative: Shock suspected (e.g., cold/pale/clammy skin, too weak to stand, low BP, rapid pulse)    Negative: Sounds like a life-threatening emergency to the triager    Protocols used: CORONAVIRUS (COVID-19) DIAGNOSED OR [id number]

## 2022-09-24 ENCOUNTER — HEALTH MAINTENANCE LETTER (OUTPATIENT)
Age: 27
End: 2022-09-24

## 2022-09-30 NOTE — PROGRESS NOTES
Plastic Surgery Outpatient Visit    ID: Elder Aaron is a 26 year old male s/p bilateral mastectomy for gender affirmation 12/17/2021 presents today for steroid injection to hypertrophic scars. s/p steroid injection 6/24 and 8/2    S: he is doing ok, states that he had covid ~3 weeks ago and is still having SOB symptoms. His scars are significantly less itchy and not painful. They seem to be flattening/softening somewhat as well.     O:  /73   Pulse 92   Temp 98  F (36.7  C) (Oral)   SpO2 98%    General: NAD  Chest: R chest scar light pink, widened, slightly raised and firm in areas but many areas soft. L chest scar widened and slightly raised but soft. R nipple flatter laterally with areas of hypopigmentation.     A/P:  -overall he is getting good results from steroid injection. Today will be the last round. If he has concerns about the wideness of scar he will need a scar revision consult with Dr. Mulligan. He is not bothered by this at this time.   -kenalog 40 mixed 1:1 with 1% lidocaine WITHOUT epi for a 20mg/ml kenalog dilution. Chest scars cleaned with alcohol wipes. Injections applied to thickened areas of scar. 0.5cc to R and 0.10cc to L. Patient tolerated well.   -will will plan for follow up as needed. He is ok with that and will contact us with any concerns in the future.     Fabiola Girard PA-C  Plastic and Reconstructive Surgery    15 minutes spent on the date of the encounter doing chart review, history and physical, dressing changes, documentation and further activity as noted above.

## 2022-10-03 ENCOUNTER — APPOINTMENT (OUTPATIENT)
Dept: GENERAL RADIOLOGY | Facility: CLINIC | Age: 27
End: 2022-10-03
Attending: EMERGENCY MEDICINE
Payer: COMMERCIAL

## 2022-10-03 ENCOUNTER — HOSPITAL ENCOUNTER (EMERGENCY)
Facility: CLINIC | Age: 27
Discharge: HOME | End: 2022-10-03
Attending: EMERGENCY MEDICINE | Admitting: EMERGENCY MEDICINE
Payer: COMMERCIAL

## 2022-10-03 ENCOUNTER — NURSE TRIAGE (OUTPATIENT)
Dept: NURSING | Facility: CLINIC | Age: 27
End: 2022-10-03

## 2022-10-03 VITALS
HEART RATE: 76 BPM | OXYGEN SATURATION: 98 % | RESPIRATION RATE: 16 BRPM | SYSTOLIC BLOOD PRESSURE: 110 MMHG | DIASTOLIC BLOOD PRESSURE: 74 MMHG | TEMPERATURE: 98.4 F

## 2022-10-03 DIAGNOSIS — U09.9 POST-COVID SYNDROME: ICD-10-CM

## 2022-10-03 DIAGNOSIS — R06.00 DYSPNEA, UNSPECIFIED TYPE: ICD-10-CM

## 2022-10-03 LAB
ALBUMIN SERPL-MCNC: 3.9 G/DL (ref 3.4–5)
ALP SERPL-CCNC: 85 U/L (ref 40–150)
ALT SERPL W P-5'-P-CCNC: 21 U/L (ref 0–70)
ANION GAP SERPL CALCULATED.3IONS-SCNC: 4 MMOL/L (ref 3–14)
AST SERPL W P-5'-P-CCNC: 9 U/L (ref 0–45)
BASOPHILS # BLD AUTO: 0 10E3/UL (ref 0–0.2)
BASOPHILS NFR BLD AUTO: 0 %
BILIRUB SERPL-MCNC: 0.4 MG/DL (ref 0.2–1.3)
BUN SERPL-MCNC: 7 MG/DL (ref 7–30)
CALCIUM SERPL-MCNC: 9.3 MG/DL (ref 8.5–10.1)
CHLORIDE BLD-SCNC: 109 MMOL/L (ref 94–109)
CO2 SERPL-SCNC: 27 MMOL/L (ref 20–32)
CREAT SERPL-MCNC: 0.62 MG/DL (ref 0.52–1.25)
D DIMER PPP FEU-MCNC: 0.32 UG/ML FEU (ref 0–0.5)
EOSINOPHIL # BLD AUTO: 0.1 10E3/UL (ref 0–0.7)
EOSINOPHIL NFR BLD AUTO: 1 %
ERYTHROCYTE [DISTWIDTH] IN BLOOD BY AUTOMATED COUNT: 13 % (ref 10–15)
GFR SERPL CREATININE-BSD FRML MDRD: >90 ML/MIN/1.73M2
GLUCOSE BLD-MCNC: 93 MG/DL (ref 70–99)
HCT VFR BLD AUTO: 41.5 % (ref 35–53)
HGB BLD-MCNC: 13.7 G/DL (ref 11.7–17.7)
IMM GRANULOCYTES # BLD: 0 10E3/UL
IMM GRANULOCYTES NFR BLD: 0 %
LYMPHOCYTES # BLD AUTO: 2.5 10E3/UL (ref 0.8–5.3)
LYMPHOCYTES NFR BLD AUTO: 31 %
MCH RBC QN AUTO: 29.5 PG (ref 26.5–33)
MCHC RBC AUTO-ENTMCNC: 33 G/DL (ref 31.5–36.5)
MCV RBC AUTO: 89 FL (ref 78–100)
MONOCYTES # BLD AUTO: 0.5 10E3/UL (ref 0–1.3)
MONOCYTES NFR BLD AUTO: 6 %
NEUTROPHILS # BLD AUTO: 5.1 10E3/UL (ref 1.6–8.3)
NEUTROPHILS NFR BLD AUTO: 62 %
NRBC # BLD AUTO: 0 10E3/UL
NRBC BLD AUTO-RTO: 0 /100
NT-PROBNP SERPL-MCNC: 42 PG/ML (ref 0–450)
PLATELET # BLD AUTO: 290 10E3/UL (ref 150–450)
POTASSIUM BLD-SCNC: 3.6 MMOL/L (ref 3.4–5.3)
PROT SERPL-MCNC: 8.4 G/DL (ref 6.8–8.8)
RBC # BLD AUTO: 4.65 10E6/UL (ref 3.8–5.9)
SODIUM SERPL-SCNC: 140 MMOL/L (ref 133–144)
TROPONIN I SERPL HS-MCNC: <3 NG/L
WBC # BLD AUTO: 8.2 10E3/UL (ref 4–11)

## 2022-10-03 PROCEDURE — 83880 ASSAY OF NATRIURETIC PEPTIDE: CPT | Performed by: EMERGENCY MEDICINE

## 2022-10-03 PROCEDURE — 84155 ASSAY OF PROTEIN SERUM: CPT | Performed by: EMERGENCY MEDICINE

## 2022-10-03 PROCEDURE — 85379 FIBRIN DEGRADATION QUANT: CPT | Performed by: EMERGENCY MEDICINE

## 2022-10-03 PROCEDURE — 84484 ASSAY OF TROPONIN QUANT: CPT | Performed by: EMERGENCY MEDICINE

## 2022-10-03 PROCEDURE — 36415 COLL VENOUS BLD VENIPUNCTURE: CPT | Performed by: EMERGENCY MEDICINE

## 2022-10-03 PROCEDURE — 71046 X-RAY EXAM CHEST 2 VIEWS: CPT

## 2022-10-03 PROCEDURE — 93005 ELECTROCARDIOGRAM TRACING: CPT | Performed by: EMERGENCY MEDICINE

## 2022-10-03 PROCEDURE — 93010 ELECTROCARDIOGRAM REPORT: CPT | Performed by: EMERGENCY MEDICINE

## 2022-10-03 PROCEDURE — 99285 EMERGENCY DEPT VISIT HI MDM: CPT | Mod: 25 | Performed by: EMERGENCY MEDICINE

## 2022-10-03 PROCEDURE — 85025 COMPLETE CBC W/AUTO DIFF WBC: CPT | Performed by: EMERGENCY MEDICINE

## 2022-10-03 RX ORDER — ALBUTEROL SULFATE 90 UG/1
2 AEROSOL, METERED RESPIRATORY (INHALATION) EVERY 4 HOURS PRN
Qty: 8 G | Refills: 0 | Status: SHIPPED | OUTPATIENT
Start: 2022-10-03 | End: 2023-12-19

## 2022-10-03 ASSESSMENT — ENCOUNTER SYMPTOMS
RHINORRHEA: 0
ARTHRALGIAS: 1
HEADACHES: 0
SHORTNESS OF BREATH: 1
FEVER: 0
ACTIVITY CHANGE: 1
SORE THROAT: 0
FATIGUE: 1
COUGH: 1
MYALGIAS: 1

## 2022-10-03 ASSESSMENT — ACTIVITIES OF DAILY LIVING (ADL): ADLS_ACUITY_SCORE: 35

## 2022-10-03 NOTE — TELEPHONE ENCOUNTER
Coughing and chest pain.Cough is dry.  Chest pain is about 5-6 ,   Sometimes feels like a dull ache.,and sometimes feels like a sharp pain.  Was seen in ER last week.., but states his symptoms are coming back.  Eating and drinking.ok. patient advised per protocol to go back to ER.  Parvin Lamas RN   [facility] Nurse Advisor      Reason for Disposition    MODERATE difficulty breathing (e.g., speaks in phrases, SOB even at rest, pulse 100-120)    Additional Information    Negative: SEVERE difficulty breathing (e.g., struggling for each breath, speaks in single words)    Negative: [1] SEVERE weakness (e.g., can't stand or can barely walk) AND [2] new-onset or WORSE    Negative: Difficult to awaken or acting confused (e.g., disoriented, slurred speech)    Negative: Bluish (or gray) lips or face now    Negative: Sounds like a life-threatening emergency to the triager    Negative: [1] Typical COVID-19 symptoms AND [2] lasting less than 3 weeks    Negative: [1] Chest pain, pressure, or tightness AND [2] new-onset or worsening    Negative: [1] Fever AND [2] new-onset or worsening    Negative: [1] MODERATE difficulty breathing (e.g., speaks in phrases, SOB even at rest, pulse 100-120) AND [2] new-onset or WORSE    Negative: [1] MODERATE difficulty breathing AND [2] oxygen level (e.g., pulse oximetry) 91 to 94 percent    Negative: Oxygen level (e.g., pulse oximetry) 90 percent or lower    Protocols used: CORONAVIRUS (COVID-19) PERSISTING SYMPTOMS FOLLOW-UP CALL-A-OH

## 2022-10-03 NOTE — ED PROVIDER NOTES
"ED Provider Note  [facility]      History     Chief Complaint   Patient presents with     Cough     Onset 2 weeks ago with covid, last night \"I developed increased coughing and it feels like I am breathing in some time of particles and I am winded with minimal activity,\" increased fatigue, weak and continued with body aches.     HPI  Elder Aaron is a 27 year old transgender male with a past medical history of migraine with aura and seasonal allergies who presents with two days of cough, shortness of breath, and fatigue. He tested positive for covid about 2 weeks ago, and noticed that his symptoms were mostly improved 1 week ago. However, starting 2 days ago, he began feeling fatigued with shortness of breath and coughing. He feels like his airway is irritated and like he is \"breathing in sand.\" Cough is dry and patient has not had any hemoptysis. Occasionally he coughs up clear mucus, which he attributes to post nasal drip from his allergies. He notices that the cough is worst when laying down, reclined, or with physical activity. He is able to walk up one flight of stairs without becoming short of breath. No personal or family history of asthma or heart conditions. He endorses a family history of blood clots (maternal grandmother). Has a history of testosterone therapy, but has not taken this for several months.     In addition, patient has been increasingly fatigued over the last few days. He becomes fatigued working on the computer, going to the store, and walking around the house. He also had an episode of right sided weakness yesterday, and he feels diffusely weak today. Patient has history of migraine with aura, but does not have sensory symptoms with his migraines typically.     Past Medical History  No past medical history on file.  Past Surgical History:   Procedure Laterality Date     ADENOIDECTOMY       COSMETIC PLACEMENT OF DENTAL IMPLANT(S)       HC TOOTH EXTRACTION W/FORCEP  " "     MASTECTOMY SIMPLE BILATERAL Bilateral 12/17/2021    Procedure: MASTECTOMY, BILATERAL, SIMPLE, WITH nipple grafts. OnQ;  Surgeon: Lashanda Mulligan MD;  Location: [facility] OR     TONSILLECTOMY       albuterol (PROAIR HFA) 108 (90 Base) MCG/ACT inhaler  acetaminophen (TYLENOL) 325 MG tablet  amphetamine-dextroamphetamine (ADDERALL XR) 10 MG 24 hr capsule  hydrOXYzine (ATARAX) 25 MG tablet  ibuprofen (ADVIL/MOTRIN) 200 MG capsule  lidocaine-prilocaine (EMLA) 2.5-2.5 % external cream  Needle, Disp, (BD DISP NEEDLE) 23G X 1\" MISC  Needle, Disp, (BD SAFETYGLIDE NEEDLE) 27G X 5/8\" MISC  ondansetron (ZOFRAN-ODT) 4 MG ODT tab  SUMAtriptan (IMITREX) 25 MG tablet  syringe, disposable, 1 ML MISC  testosterone cypionate (DEPOTESTOSTERONE) 200 MG/ML injection      No Known Allergies  Family History  Family History   Problem Relation Age of Onset     Clotting Disorder Maternal Grandmother      Cancer Maternal Grandfather         Colon Ca     Thyroid Disease Mother      Social History   Social History     Tobacco Use     Smoking status: Never Smoker     Smokeless tobacco: Never Used   Substance Use Topics     Alcohol use: Yes     Comment: occ.     Drug use: Never      Past medical history, past surgical history, medications, allergies, family history, and social history were reviewed with the patient. No additional pertinent items.       Review of Systems   Constitutional: Positive for activity change and fatigue. Negative for fever.   HENT: Positive for postnasal drip. Negative for congestion, rhinorrhea and sore throat.    Eyes: Negative for discharge and redness.   Respiratory: Positive for cough and shortness of breath.    Cardiovascular: Negative for chest pain and leg swelling.   Gastrointestinal: Negative for nausea and vomiting.   Musculoskeletal: Positive for arthralgias and myalgias.   Skin: Negative for color change and rash.   Allergic/Immunologic: Positive for environmental allergies. Negative for " immunocompromised state.   Neurological: Positive for weakness. Negative for headaches.       Physical Exam   BP: 110/74  Pulse: 76  Temp: 98.4  F (36.9  C)  Resp: 16  SpO2: 98 %  Physical Exam  Constitutional:       General: He is not in acute distress.     Appearance: He is not ill-appearing or toxic-appearing.   HENT:      Head: Normocephalic and atraumatic.      Mouth/Throat:      Mouth: Mucous membranes are moist.      Pharynx: No oropharyngeal exudate or posterior oropharyngeal erythema.   Eyes:      Pupils: Pupils are equal, round, and reactive to light.   Cardiovascular:      Rate and Rhythm: Normal rate and regular rhythm.      Heart sounds: No murmur heard.  Pulmonary:      Effort: Pulmonary effort is normal. No respiratory distress.      Breath sounds: Normal breath sounds. No stridor. No wheezing.   Musculoskeletal:      Right lower leg: No edema.      Left lower leg: No edema.   Lymphadenopathy:      Cervical: No cervical adenopathy.   Skin:     General: Skin is warm and dry.   Neurological:      General: No focal deficit present.      Mental Status: He is alert and oriented to person, place, and time.      Cranial Nerves: No facial asymmetry.      Sensory: Sensation is intact.      Motor: Motor function is intact. No weakness (in bilateral upper extremities and lower extremities).      Coordination: Coordination is intact. Coordination normal.      Gait: Gait normal.      Deep Tendon Reflexes:      Reflex Scores:       Brachioradialis reflexes are 2+ on the right side and 2+ on the left side.       Patellar reflexes are 2+ on the right side and 2+ on the left side.  Psychiatric:         Mood and Affect: Mood normal.         Behavior: Behavior normal.       ED Course       Results for orders placed or performed during the hospital encounter of 10/03/22   XR Chest 2 Views     Status: None    Narrative    EXAM: XR CHEST 2 VIEWS  LOCATION: [facility]  CENTER  DATE/TIME: 10/3/2022 5:41 PM    INDICATION: cough, pain  COMPARISON: 09/19/2022      Impression    IMPRESSION: Lungs are clear. No pleural effusion. Heart size and pulmonary vascularity within normal limits.   Comprehensive metabolic panel     Status: Normal   Result Value Ref Range    Sodium 140 133 - 144 mmol/L    Potassium 3.6 3.4 - 5.3 mmol/L    Chloride 109 94 - 109 mmol/L    Carbon Dioxide (CO2) 27 20 - 32 mmol/L    Anion Gap 4 3 - 14 mmol/L    Urea Nitrogen 7 7 - 30 mg/dL    Creatinine 0.62 0.52 - 1.25 mg/dL    Calcium 9.3 8.5 - 10.1 mg/dL    Glucose 93 70 - 99 mg/dL    Alkaline Phosphatase 85 40 - 150 U/L    AST 9 0 - 45 U/L    ALT 21 0 - 70 U/L    Protein Total 8.4 6.8 - 8.8 g/dL    Albumin 3.9 3.4 - 5.0 g/dL    Bilirubin Total 0.4 0.2 - 1.3 mg/dL    GFR Estimate >90 >60 mL/min/1.73m2    Narrative    The sex of this patient cannot be reliably determined based on discrepancies in demographics (legal sex, sex assigned at birth, gender identity).  Both male and female reference ranges are provided where applicable.  Careful evaluation of the patient s results as compared to the gender specific reference intervals is required in this setting.    Troponin I     Status: Normal   Result Value Ref Range    Troponin I High Sensitivity <3 <54 ng/L    Narrative    The sex of this patient cannot be reliably determined based on discrepancies in demographics (legal sex, sex assigned at birth, gender identity).  Both male and female reference ranges are provided where applicable.  Careful evaluation of the patient s results as compared to the gender specific reference intervals is required in this setting.    D dimer quantitative     Status: Normal   Result Value Ref Range    D-Dimer Quantitative 0.32 0.00 - 0.50 ug/mL FEU    Narrative    This D-dimer assay is intended for use in conjunction with a clinical pretest probability assessment model to exclude pulmonary embolism (PE) and deep venous thrombosis (DVT) in  outpatients suspected of PE or DVT. The cut-off value is 0.50 ug/mL FEU.   Nt probnp inpatient (BNP)     Status: Normal   Result Value Ref Range    N terminal Pro BNP Inpatient 42 0 - 450 pg/mL   CBC with platelets and differential     Status: None   Result Value Ref Range    WBC Count 8.2 4.0 - 11.0 10e3/uL    RBC Count 4.65 3.80 - 5.90 10e6/uL    Hemoglobin 13.7 11.7 - 17.7 g/dL    Hematocrit 41.5 35.0 - 53.0 %    MCV 89 78 - 100 fL    MCH 29.5 26.5 - 33.0 pg    MCHC 33.0 31.5 - 36.5 g/dL    RDW 13.0 10.0 - 15.0 %    Platelet Count 290 150 - 450 10e3/uL    % Neutrophils 62 %    % Lymphocytes 31 %    % Monocytes 6 %    % Eosinophils 1 %    % Basophils 0 %    % Immature Granulocytes 0 %    NRBCs per 100 WBC 0 <1 /100    Absolute Neutrophils 5.1 1.6 - 8.3 10e3/uL    Absolute Lymphocytes 2.5 0.8 - 5.3 10e3/uL    Absolute Monocytes 0.5 0.0 - 1.3 10e3/uL    Absolute Eosinophils 0.1 0.0 - 0.7 10e3/uL    Absolute Basophils 0.0 0.0 - 0.2 10e3/uL    Absolute Immature Granulocytes 0.0 <=0.4 10e3/uL    Absolute NRBCs 0.0 10e3/uL    Narrative    The sex of this patient cannot be reliably determined based on discrepancies in demographics (legal sex, sex assigned at birth, gender identity).  Both male and female reference ranges are provided where applicable.  Careful evaluation of the patient s results as compared to the gender specific reference intervals is required in this setting.    EKG 12-lead, tracing only     Status: None (Preliminary result)   Result Value Ref Range    Systolic Blood Pressure  mmHg    Diastolic Blood Pressure  mmHg    Ventricular Rate 78 BPM    Atrial Rate 78 BPM    SC Interval 122 ms    QRS Duration 92 ms     ms    QTc 419 ms    P Axis 49 degrees    R AXIS 7 degrees    T Axis 43 degrees    Interpretation ECG       Sinus rhythm  Possible Left atrial enlargement  Borderline ECG     CBC with platelets differential     Status: None    Narrative    The following orders were created for panel order  CBC with platelets differential.  Procedure                               Abnormality         Status                     ---------                               -----------         ------                     CBC with platelets and d...[[id number]]                      Final result                 Please view results for these tests on the individual orders.     Medications - No data to display     Assessments & Plan (with Medical Decision Making)   Elder Aaron is a 27 year old transgender male with a past medical history of migraine with aura who presents with two days of cough, shortness of breath, and fatigue. On exam his lungs were clear to auscultation bilaterally, he did not have lower extremity swelling, and vitals were stable. He is recently Covid positive, has a family history of blood clots, and had been using testosterone until a few months ago.     Differential diagnosis includes persistent Covid symptoms/sequelae of covid infection vs pulmonary embolism vs myocarditis vs bacterial pneumonia. Persistent Covid symptoms are most likely, given his recent positive test, clear CXR, and reassuring pulmonary exam. Pulmonary embolism is less likely but concerning given patient's risk factors for hypercoagulability (recent Covid positive, family hx blood clots, though he has not used testosterone recently). Obtained D-dimer, which was within normal limits. Myocarditis was considered given his recent Covid and worsening of symptoms while laying flat. Cardiac silhouette on CXR was not enlarged, and EKG, troponin, and BNP were within normal limits.     Given patient's reassuring exam, labs, CXR, and EKG, he was discharged to home. Patient was encouraged to try OTC cough syrup and lozenges. Also sent patient home with albuterol inhaler to use as needed.     Patient was advised to follow up with PCP within 1 week. To return to ER immediately with any new/worsening symptoms. Plan of care discussed with patient who  expresses understanding and agrees with plan of care.    I have reviewed the nursing notes. I have reviewed the findings, diagnosis, plan and need for follow up with the patient.    New Prescriptions    ALBUTEROL (PROAIR HFA) 108 (90 BASE) MCG/ACT INHALER    Inhale 2 puffs into the lungs every 4 hours as needed for shortness of breath / dyspnea       Final diagnoses:   Post-COVID syndrome   Dyspnea, unspecified type     Karis Solis, MS4    --    ED Attending Physician Attestation    I Margie Emmanuel MD, cared for this patient with the Medical Student. I performed, or re-performed, the physical exam and medical decision-making. I have verified the accuracy of all the medical student findings and documentation above, and have edited as necessary.    Summary of HPI, PE, ED Course   Patient is a 27 year old adult evaluated in the emergency department for shortness of breath. Exam notable for clear lungs. ED course notable for negative workup. After the completion of care in the emergency department, the patient was discharged.    Critical Care & Procedures  Not applicable.    Medical Decision Making  The medical record was reviewed and interpreted.  Current labs reviewed and interpreted.  Previous labs reviewed and interpreted.  Current images reviewed and interpreted:  .  EKG reviewed and interpreted:  .      Margie Emmanuel MD  Emergency Medicine     [facility] EMERGENCY DEPARTMENT  10/3/2022     Margie Emmanuel MD  10/04/22 0130

## 2022-10-04 ENCOUNTER — OFFICE VISIT (OUTPATIENT)
Dept: PLASTIC SURGERY | Facility: CLINIC | Age: 27
End: 2022-10-04
Payer: COMMERCIAL

## 2022-10-04 VITALS
HEART RATE: 92 BPM | TEMPERATURE: 98 F | OXYGEN SATURATION: 98 % | SYSTOLIC BLOOD PRESSURE: 115 MMHG | DIASTOLIC BLOOD PRESSURE: 73 MMHG

## 2022-10-04 DIAGNOSIS — L91.0 KELOID SCAR: Primary | ICD-10-CM

## 2022-10-04 DIAGNOSIS — F64.9 GENDER DYSPHORIA: ICD-10-CM

## 2022-10-04 LAB
ATRIAL RATE - MUSE: 78 BPM
DIASTOLIC BLOOD PRESSURE - MUSE: NORMAL MMHG
INTERPRETATION ECG - MUSE: NORMAL
P AXIS - MUSE: 49 DEGREES
PR INTERVAL - MUSE: 122 MS
QRS DURATION - MUSE: 92 MS
QT - MUSE: 368 MS
QTC - MUSE: 419 MS
R AXIS - MUSE: 7 DEGREES
SYSTOLIC BLOOD PRESSURE - MUSE: NORMAL MMHG
T AXIS - MUSE: 43 DEGREES
VENTRICULAR RATE- MUSE: 78 BPM

## 2022-10-04 PROCEDURE — 11900 INJECT SKIN LESIONS </W 7: CPT | Performed by: PHYSICIAN ASSISTANT

## 2022-10-04 RX ORDER — BENZONATATE 100 MG/1
100 CAPSULE ORAL
COMMUNITY
Start: 2022-09-19 | End: 2023-12-19

## 2022-10-04 RX ORDER — TRIAMCINOLONE ACETONIDE 40 MG/ML
40 INJECTION, SUSPENSION INTRA-ARTICULAR; INTRAMUSCULAR ONCE
Status: COMPLETED | OUTPATIENT
Start: 2022-10-04 | End: 2022-10-04

## 2022-10-04 RX ADMIN — TRIAMCINOLONE ACETONIDE 40 MG: 40 INJECTION, SUSPENSION INTRA-ARTICULAR; INTRAMUSCULAR at 15:01

## 2022-10-04 ASSESSMENT — PAIN SCALES - GENERAL: PAINLEVEL: NO PAIN (0)

## 2022-10-04 ASSESSMENT — ENCOUNTER SYMPTOMS
EYE REDNESS: 0
NAUSEA: 0
COLOR CHANGE: 0
WEAKNESS: 1
VOMITING: 0
EYE DISCHARGE: 0

## 2022-10-04 NOTE — LETTER
10/4/2022       RE: Elva Aaron  [address] [place] [address]     Dear Colleague,    Thank you for referring your patient, Elva Aaron, to the [facility] [place] at [facility]. Please see a copy of my visit note below.    Plastic Surgery Outpatient Visit    ID: Elder Aaron is a 26 year old male s/p bilateral mastectomy for gender affirmation 12/17/2021 presents today for steroid injection to hypertrophic scars. s/p steroid injection 6/24 and 8/2    S: he is doing ok, states that he had covid ~3 weeks ago and is still having SOB symptoms. His scars are significantly less itchy and not painful. They seem to be flattening/softening somewhat as well.     O:  /73   Pulse 92   Temp 98  F (36.7  C) (Oral)   SpO2 98%    General: NAD  Chest: R chest scar light pink, widened, slightly raised and firm in areas but many areas soft. L chest scar widened and slightly raised but soft. R nipple flatter laterally with areas of hypopigmentation.     A/P:  -overall he is getting good results from steroid injection. Today will be the last round. If he has concerns about the wideness of scar he will need a scar revision consult with Dr. Mulligan. He is not bothered by this at this time.   -kenalog 40 mixed 1:1 with 1% lidocaine WITHOUT epi for a 20mg/ml kenalog dilution. Chest scars cleaned with alcohol wipes. Injections applied to thickened areas of scar. 0.5cc to R and 0.10cc to L. Patient tolerated well.   -will will plan for follow up as needed. He is ok with that and will contact us with any concerns in the future.     Fabiola Girard PA-C  Plastic and Reconstructive Surgery    15 minutes spent on the date of the encounter doing chart review, history and physical, dressing changes, documentation and further activity as noted above.

## 2022-10-04 NOTE — ED NOTES
EKG Interpretation:      Interpreted by Margie Emmanuel MD  Time reviewed: 1854   Symptoms at time of EKG: SOB   Rhythm: normal sinus   Rate: normal  Axis: NORMAL  Ectopy: none  Conduction: normal  LAE  ST Segments/ T Waves: No ST-T wave changes  Q Waves: none  Comparison to prior: No old EKG available    Clinical Impression: normal EKG    This note is for EKG interpretation only, please see ED provider note for further details of care.       Margie Emmanuel MD  10/03/22 1928

## 2022-10-04 NOTE — DISCHARGE INSTRUCTIONS
TODAY'S VISIT:  You were seen today for shortness of breath  -   - If you had any labs or imaging/radiology tests performed today, you should also discuss these tests with your usual provider.     FOLLOW-UP:  Please make an appointment to follow up with:  - Your Primary Care Provider. If you do not have a PCP, please call the Primary Care Center (phone: [phone] for an appointment    - Have your provider review the results from today's visit with you again to make sure no further follow-up or additional testing is needed based on those results.     PRESCRIPTIONS / MEDICATIONS:  - albuterol as need for shortness of breath and cough     RETURN TO THE EMERGENCY DEPARTMENT  Return to the Emergency Department at any time for any new or worsening symptoms or any concerns.

## 2022-10-05 NOTE — PROGRESS NOTES
Drug Administration Record    Drug Name: Kenalog  Dose: 40mg/ml  Route administered: IM  NDC #: Kenalog-40 ([address]-0)  Amount of waste(mL): 0    LOT #: [id number]  SITE: See Providers Note  : [employer]  EXPIRATION DATE: 04/2024    Drug Administration Record    Drug Name: Lidocaine  Dose: 10mg/ml  Route administered: IM  NDC #: NDC 44962-682-60  Amount of waste(mL): See Providers note  Reason for waste: As per MD    LOT #: [id number]  SITE: see providers note  : [employer]  EXPIRATION DATE: 12/2025      Antonino Johnson EMT

## 2022-10-06 ENCOUNTER — OFFICE VISIT (OUTPATIENT)
Dept: FAMILY MEDICINE | Facility: CLINIC | Age: 27
End: 2022-10-06
Payer: COMMERCIAL

## 2022-10-06 VITALS
TEMPERATURE: 98 F | OXYGEN SATURATION: 97 % | RESPIRATION RATE: 16 BRPM | HEART RATE: 82 BPM | SYSTOLIC BLOOD PRESSURE: 100 MMHG | DIASTOLIC BLOOD PRESSURE: 70 MMHG

## 2022-10-06 DIAGNOSIS — U09.9 POST-ACUTE SEQUELAE OF SARS-COV-2 INFECTION: Primary | ICD-10-CM

## 2022-10-06 PROCEDURE — 99213 OFFICE O/P EST LOW 20 MIN: CPT | Mod: GC

## 2022-10-06 NOTE — PROGRESS NOTES
Preceptor Attestation:   Patient seen, evaluated and discussed with the resident. I have verified the content of the note, which accurately reflects my assessment of the patient and the plan of care.   Supervising Physician:  Kim Law MD

## 2022-10-06 NOTE — PROGRESS NOTES
"  Assessment & Plan     Post-acute sequelae of SARS-CoV-2 infection  Patient presenting for ER follow up for chest pain symptoms seemingly related to lingering COVID symptoms after his COVID infection starting on 9/14/22.  In the emergency department, CXR was negative, ruling out pneumonia. Heart border not enlarged, chest pain related to cough, and no fever, making myocarditis unlikely.  D-dimer negative and no history of blood clots (although his maternal grandmother did have a history of blood clots), making PE unlikely.  Other infection considered, however unlikely as he has had no fever symptoms are restricted to dry cough and chest pain.  As he is only 1 month out from his initial COVID infection, will hold off on referral for as long COVID, but may reconsider in the future.  -Deep breathing exercise instructions provided  -Will use NSAIDs for chest pain  -Will continue to use his cough medicine for cough  -Patient counseled that symptoms may persist for up to 6 to 12 months  -Will consider referral to long COVID clinic if symptoms persist for more than 6 weeks   -Will hold on flu shot and COVID booster today as patient is symptomatic       BMI:   Estimated body mass index is 29.95 kg/m  as calculated from the following:    Height as of 6/21/22: 1.651 m (5' 5\").    Weight as of 6/21/22: 81.6 kg (180 lb).   Weight management plan noted, stable and monitoring    No follow-ups on file.    Kasey Montero MD  [facility] AYANNA Friedman is a 27 year old, presenting for the following health issues:  Hospital F/U (Chest at [facility] at 9/19/2022 and Post Covid at [facility] on 10/03/ 22)      HPI   Patient started having COVID symptoms 9/14, had symptoms of coughing, shortness of breath, and fever (although temperature stayed below 100 degrees) as well as congestion and runny nose, sore throat and fatigue.  PCR test was positive 9/16. the symptoms persisted for about 12 days (felt feverish only for 2 " to 3 days), and symptoms improved although cough persisted.  He had 2 negative COVID tests on 9/26.  Pain started to develop, was associated with cough.  Not positional.  He tried Tylenol and ibuprofen which did not help.  Deep breathing did not exacerbate the pain.  He was seen in the emergency department on 10/3, work-up for chest pain negative including normal EKG, x-ray, BNP, D-dimer, troponin, CMP, and CBC.  No leg swelling, no color changes in fingers or toes or nailbeds.  Does not smoke.  Has not been taking testosterone in the past 6 months due to issues with his insurance.  When anybody sick.    Objective    /70   Pulse 82   Temp 98  F (36.7  C) (Oral)   Resp 16   SpO2 97%   There is no height or weight on file to calculate BMI.  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  Patellar brachial, biceps, and triceps reflex 2+.  MS: no gross musculoskeletal defects noted, no edema    Kasey Montero MD    ----- Service Performed and Documented by Resident or Fellow ------

## 2022-10-06 NOTE — PATIENT INSTRUCTIONS
Patient Education   Here is the plan from today's visit    1. Post-acute sequelae of SARS-CoV-2 infection  Your symptoms may persist for months, but please let us know if symptoms worsen or new ones develop. Please do your breathing exercises to improve lung function. Take ibuprofen or motrin for chest pain. You can use your cough medicine for cough. Please let me know if symptoms have continued for 2 additional weeks, and you would like to be referred to the [facility] clinic to further treat your symptoms. Get your flu shot in about 2 weeks!      Please call or return to clinic if your symptoms don't go away.    Follow up plan  As needed    Thank you for coming to [facility] today.  Lab Testing:  **If you had lab testing today and your results are reassuring or normal they will be mailed to you or sent through [employer] within 7 days.   **If the lab tests need quick action we will call you with the results.  **If you are having labs done on a different day, please call [phone] to schedule at [facility] or [phone] for other [facility] locations. Labs do not offer walk-in appointments.  The phone number we will call with results is # [phone] (home) . If this is not the best number please call our clinic and change the number.  Medication Refills:  If you need any refills please call your pharmacy and they will contact us.   If you need to  your refill at a new pharmacy, please contact the new pharmacy directly. The new pharmacy will help you get your medications transferred faster.   Scheduling:  If you have any concerns about today's visit or wish to schedule another appointment please call our office during normal business hours [phone] (8-5:00 M-F)  If a referral was made to an [facility] specialty provider and you do not get a call from central scheduling, please refer to directions on your visit summary or call our office during normal business hours  for assistance.   If a Mammogram was ordered for you at the [facility] call [phone] to schedule or change your appointment.  If you had an XRay/CT/Ultrasound/MRI ordered the number is [phone] to schedule or change your radiology appointment.   [facility] has limited ultrasound appointments available on Wednesdays, if you would like your ultrasound at [facility], please call [phone] to schedule.   Medical Concerns:  If you have urgent medical concerns please call [phone] at any time of the day.    Kasey Montero MD

## 2022-10-19 ENCOUNTER — MYC MEDICAL ADVICE (OUTPATIENT)
Dept: FAMILY MEDICINE | Facility: CLINIC | Age: 27
End: 2022-10-19

## 2022-10-19 DIAGNOSIS — U09.9 POST-ACUTE SEQUELAE OF SARS-COV-2 INFECTION: Primary | ICD-10-CM

## 2022-10-21 ENCOUNTER — VIRTUAL VISIT (OUTPATIENT)
Dept: PHYSICAL MEDICINE AND REHAB | Facility: CLINIC | Age: 27
End: 2022-10-21
Attending: STUDENT IN AN ORGANIZED HEALTH CARE EDUCATION/TRAINING PROGRAM
Payer: COMMERCIAL

## 2022-10-21 DIAGNOSIS — G93.32 POST-COVID CHRONIC FATIGUE: ICD-10-CM

## 2022-10-21 DIAGNOSIS — R06.09 POST-COVID CHRONIC DYSPNEA: Primary | ICD-10-CM

## 2022-10-21 DIAGNOSIS — U09.9 POST-ACUTE SEQUELAE OF SARS-COV-2 INFECTION: ICD-10-CM

## 2022-10-21 DIAGNOSIS — U09.9 POST-COVID CHRONIC FATIGUE: ICD-10-CM

## 2022-10-21 DIAGNOSIS — U09.9 POST-COVID CHRONIC DYSPNEA: Primary | ICD-10-CM

## 2022-10-21 DIAGNOSIS — M79.10 MYALGIA: ICD-10-CM

## 2022-10-21 PROCEDURE — 99205 OFFICE O/P NEW HI 60 MIN: CPT | Mod: 95 | Performed by: PHYSICIAN ASSISTANT

## 2022-10-21 ASSESSMENT — ENCOUNTER SYMPTOMS
WEIGHT GAIN: 0
SINUS CONGESTION: 1
BACK PAIN: 1
ALTERED TEMPERATURE REGULATION: 0
FEVER: 0
COUGH DISTURBING SLEEP: 1
COUGH: 1
SNORES LOUDLY: 0
MYALGIAS: 1
STIFFNESS: 0
DECREASED APPETITE: 0
INCREASED ENERGY: 1
ABDOMINAL PAIN: 0
BLOATING: 1
NECK MASS: 0
SMELL DISTURBANCE: 0
SINUS PAIN: 0
EYE PAIN: 0
SHORTNESS OF BREATH: 1
FATIGUE: 1
EYE WATERING: 0
JAUNDICE: 0
BOWEL INCONTINENCE: 0
EYE REDNESS: 0
SPUTUM PRODUCTION: 0
CHILLS: 0
TASTE DISTURBANCE: 0
WEIGHT LOSS: 0
DYSPNEA ON EXERTION: 1
HALLUCINATIONS: 0
MUSCLE WEAKNESS: 0
DOUBLE VISION: 0
TROUBLE SWALLOWING: 0
NECK PAIN: 1
HOARSE VOICE: 0
BLOOD IN STOOL: 0
HEARTBURN: 1
POLYPHAGIA: 0
HEMOPTYSIS: 0
EYE IRRITATION: 0
POLYDIPSIA: 0
NIGHT SWEATS: 0
ARTHRALGIAS: 1
POSTURAL DYSPNEA: 0
MUSCLE CRAMPS: 0
DIARRHEA: 0
RECTAL PAIN: 1
SORE THROAT: 1
WHEEZING: 0
JOINT SWELLING: 0
NAUSEA: 0
VOMITING: 0
CONSTIPATION: 1

## 2022-10-21 ASSESSMENT — ANXIETY QUESTIONNAIRES
1. FEELING NERVOUS, ANXIOUS, OR ON EDGE: NOT AT ALL
2. NOT BEING ABLE TO STOP OR CONTROL WORRYING: NOT AT ALL
GAD7 TOTAL SCORE: 0
6. BECOMING EASILY ANNOYED OR IRRITABLE: NOT AT ALL
5. BEING SO RESTLESS THAT IT IS HARD TO SIT STILL: NOT AT ALL
8. IF YOU CHECKED OFF ANY PROBLEMS, HOW DIFFICULT HAVE THESE MADE IT FOR YOU TO DO YOUR WORK, TAKE CARE OF THINGS AT HOME, OR GET ALONG WITH OTHER PEOPLE?: NOT DIFFICULT AT ALL
3. WORRYING TOO MUCH ABOUT DIFFERENT THINGS: NOT AT ALL
4. TROUBLE RELAXING: NOT AT ALL
7. FEELING AFRAID AS IF SOMETHING AWFUL MIGHT HAPPEN: NOT AT ALL
GAD7 TOTAL SCORE: 0
IF YOU CHECKED OFF ANY PROBLEMS ON THIS QUESTIONNAIRE, HOW DIFFICULT HAVE THESE PROBLEMS MADE IT FOR YOU TO DO YOUR WORK, TAKE CARE OF THINGS AT HOME, OR GET ALONG WITH OTHER PEOPLE: NOT DIFFICULT AT ALL
GAD7 TOTAL SCORE: 0
7. FEELING AFRAID AS IF SOMETHING AWFUL MIGHT HAPPEN: NOT AT ALL

## 2022-10-21 ASSESSMENT — PATIENT HEALTH QUESTIONNAIRE - PHQ9
SUM OF ALL RESPONSES TO PHQ QUESTIONS 1-9: 4
SUM OF ALL RESPONSES TO PHQ QUESTIONS 1-9: 4
10. IF YOU CHECKED OFF ANY PROBLEMS, HOW DIFFICULT HAVE THESE PROBLEMS MADE IT FOR YOU TO DO YOUR WORK, TAKE CARE OF THINGS AT HOME, OR GET ALONG WITH OTHER PEOPLE: SOMEWHAT DIFFICULT

## 2022-10-21 NOTE — PATIENT INSTRUCTIONS
Post COVID Self Care Suggestions:     Fatigue Management:       https://www.archives-pmr.org/action/showPdf?ifa=-7126%2819%9966142-6         Self Care:      [email or web address]    Recovery World Health Organization:    https://apps.who.int/iris/bitstream/handle/24065/889557/LNK-LTVQ-3355-216-79209-27222-eng.pdf    Breathing exercises:    [email or web address]

## 2022-10-21 NOTE — PROGRESS NOTES
"Elder is a 27 year old who is being evaluated via a billable video visit.      How would you like to obtain your AVS? MyChart  If the video visit is dropped, the invitation should be resent by: Text to cell phone: [phone]  Will anyone else be joining your video visit? No    Assessment/ Impression:   1. Post-COVID chronic dyspnea  Patient with lingering shortness of breath which has improved from his intial infection.  Discussed breathing exercises and encouraged to take cough medicine to help limit his cough. Discussed pathology of pleurisy and stated if cough not improved in a week should reach out to his primary care for guidance.  If dyspnea not improved at follow up will refer to pulmonology or ENT based on lingering syptoms. Discussed he may need Flonase due to post nasal drainage which can aggravate cough. He will reach out if this continues for a prescription.    2. Post-COVID chronic fatigue  Patient with fatigue after only a few tasks. Discussed energy conservation and provided information on fatigue management.  Also discussed referral to Occupational therapy for the COVID-19 program. Will also check B12 and TSH  - Vitamin B12; Future  - Occupational Therapy Referral; Future  - TSH with free T4 reflex; Future    3. Myalgia  Patient has muscle aches mostly in his upper back, neck and arms. Discussed doing occupational therapy as he develops fatigue with most use of his arms.     4. Post-acute sequelae of SARS-CoV-2 infection  Discussed COVID and Post COVID with patient.  Educational materials provided and all questions answered. Discussed receiving his booster vaccine and will discussed at next visit.  - Adult Post Covid Clinic Referral    Plan:  1. I reviewed present knowledge on long-Covid.  Education was provided and question were answered.  2. Orders/Referrals as above  3. Will advised patient on test results  4. I will follow up with Elva \"Johnd\" Agnieszka in 6 weeks. I will review progress and " "consider need for any other therapeutic interventions. If there are any questions and/or concerns he will call the clinic.      On day of encounter time spent in chart review and with patient in consultation, exam, education, coordination of care, review of outside charts/data and documentation:  60 minutes     I have attempted to proof read for major spelling errors and apologize for any minor errors I may have missed.             _________________________________  DENVER Cassidy [facility] PHYSICAL MEDICINE AND REHABILITATION CLINIC [facility]   This 27 year old adult presents to the [facility] Rehabilitation Medicine Post-COVID clinic as a new consult to evaluate continuing symptoms after COVID infection initially diagnosed 9/15/22.  Elva \"Zed\" Agnieszka presented to ED on 9/19/22 complaining of sore throat, rhinorrhea, shortness of breath, cough, chest pain, fever, chills, fatigue and weakness. Treatment was symptomatic, albuterol.  Elva \"Zed\" Agnieszka experienced complications of shortness of breath.  Continuing symptoms include fatigue, generalized aches, cough, shortness of breath and headache.  Patient has shortness of breath with exertion.  He will feel winded walking up stairs.  He does feel this is improving.   He has intermittent chest pain as well. He feels the pain mostly on the left side and  He has been taking Advil for his pain.   Patient is still coughing as well with exertion. Patient will feel tired during the day. He will either wake up feeling tired or feel tired after 1-2 activities.  Patient is having has shoulder, neck and arms.  Patient will get muscle fatigue very quickly.   Past medical history is significant for Migraines, depression and anxiety. The patient was vaccinated against COVID X3.  Previous activity was unemployed, .     History of COVID-19 infection: 9/15/22  Date of first symptoms: 9/13/22  Diagnosis: antigen  Hospitalization: " No  Treatment: symptomatic  Current Symptoms: See subjective  Goals of Care: increase energy, decrease shortness of breath, decrease coughing, decrease chest pain and improve quality of life      PHQ Assesment Total Score(s) 10/21/2022   PHQ-9 Score 4   Some recent data might be hidden     ARIK-7 Results 10/21/2022   ARIK 7 TOTAL SCORE 0 (minimal anxiety)   ARIK-7 Total Score 0   Some recent data might be hidden     PTSD Screen Score 10/21/2022   Have you ever experienced this kind of event? No   Some recent data might be hidden     PROMIS 29  Scoring Physical Function (higher score better) (range: 4 - 20) 14 (   Sum of 4 Physical Function Questions)   Scoring Anxiety (lower score better) (range: 4 - 20) 4 (   Sum of 4 Anxiety Questions)   Scoring Depression (lower score better) (range: 4 - 20) 4 (   Sum of 4 Depression Questions)   Scoring Fatigue (lower score better) (range: 4 - 20) 16 (   Sum of 4 Fatigue [place] )   Scoring Sleep Disturbance (lower score better) (range: 4 - 20) 14 (   Sum of 4 Sleep Disturbance Questions)   Scoring Satisfaction with Social Role (higher score better) (range: 4 - 20) 8 (   Sum of 4 Satisfaction with Social Role Questions)   Scoring Pain Interference (lower score better) (range: 4 - 20) 15 (   Sum of 4 Pain Interference Questions)       No past medical history on file.    Past Surgical History:   Procedure Laterality Date     ADENOIDECTOMY       COSMETIC PLACEMENT OF DENTAL IMPLANT(S)       HC TOOTH EXTRACTION W/FORCEP       MASTECTOMY SIMPLE BILATERAL Bilateral 12/17/2021    Procedure: MASTECTOMY, BILATERAL, SIMPLE, WITH nipple grafts. OnQ;  Surgeon: Lashanda Mulligan MD;  Location: [facility] OR     TONSILLECTOMY         Family History   Problem Relation Age of Onset     Clotting Disorder Maternal Grandmother      Cancer Maternal Grandfather         Colon Ca     Thyroid Disease Mother        Social History     Tobacco Use     Smoking status: Never     Smokeless tobacco: Never  "  Substance Use Topics     Alcohol use: Yes     Comment: occ.     Drug use: Never         Current Outpatient Medications:      acetaminophen (TYLENOL) 325 MG tablet, Take 325-650 mg by mouth every 6 hours as needed for mild pain, Disp: , Rfl:      albuterol (PROAIR HFA) 108 (90 Base) MCG/ACT inhaler, Inhale 2 puffs into the lungs every 4 hours as needed for shortness of breath / dyspnea, Disp: 8 g, Rfl: 0     amphetamine-dextroamphetamine (ADDERALL XR) 10 MG 24 hr capsule, Take 1 capsule (10 mg) by mouth daily (Patient not taking: No sig reported), Disp: 30 capsule, Rfl: 0     benzonatate (TESSALON) 100 MG capsule, Take 100 mg by mouth (Patient not taking: Reported on 10/6/2022), Disp: , Rfl:      hydrOXYzine (ATARAX) 25 MG tablet, Take 1 tablet (25 mg) by mouth 3 times daily as needed for itching (Patient not taking: Reported on 10/6/2022), Disp: 20 tablet, Rfl: 0     ibuprofen (ADVIL/MOTRIN) 200 MG capsule, Take 200 mg by mouth every 4 hours as needed for fever , Disp: , Rfl:      lidocaine-prilocaine (EMLA) 2.5-2.5 % external cream, Apply topically as needed for moderate pain Apply to symptomatic hypertrophic scars prior to steroid injections. Apply within 30-45 minutes prior to appointment. Cover with Tegaderm or Saran Wrap. (Patient not taking: Reported on 10/6/2022), Disp: 30 g, Rfl: 1     Needle, Disp, (BD DISP NEEDLE) 23G X 1\" MISC, 1 Units once a week (Patient not taking: Reported on 10/6/2022), Disp: 50 each, Rfl: 1     Needle, Disp, (BD SAFETYGLIDE NEEDLE) 27G X 5/8\" MISC, USE ONE NEEDLE ONCE WEEKLY (Patient not taking: Reported on 10/6/2022), Disp: 50 each, Rfl: 1     ondansetron (ZOFRAN-ODT) 4 MG ODT tab, Take 1 tablet (4 mg) by mouth every 8 hours as needed for nausea (Patient not taking: Reported on 10/6/2022), Disp: 20 tablet, Rfl: 0     SUMAtriptan (IMITREX) 25 MG tablet, Take 25 mg by mouth at onset of headache for migraine, Disp: , Rfl:      syringe, disposable, 1 ML MISC, 1 Units once a week " (Patient not taking: Reported on 10/6/2022), Disp: 60 each, Rfl: 1     testosterone cypionate (DEPOTESTOSTERONE) 200 MG/ML injection, Inject 0.4 mLs (80 mg) into the muscle once a week Inject 0.4mLs (80mg) into the muscle once a week. (Patient not taking: Reported on 10/6/2022), Disp: 10 mL, Rfl: 5      Answers to ROS/HPI submitted by patient on 10/21/22  Review of Systems   Constitutional: Positive for fatigue. Negative for chills and fever.   HENT: Positive for sore throat and tinnitus. Negative for ear discharge, ear pain, hearing loss, mouth sores, nosebleeds, sinus pain and trouble swallowing.    Eyes: Negative for pain and redness.   Respiratory: Positive for cough and shortness of breath. Negative for wheezing.    Gastrointestinal: Positive for constipation and rectal pain. Negative for abdominal pain, blood in stool, diarrhea, nausea and vomiting.   Endocrine: Negative for polydipsia and polyphagia.   Musculoskeletal: Positive for arthralgias, back pain, myalgias and neck pain. Negative for joint swelling.   Psychiatric/Behavioral: Negative for hallucinations.          Objective    Vitals:  No vitals were obtained today due to virtual visit.    Physical Exam   GENERAL: Healthy, alert and no distress  EYES: Eyes grossly normal to inspection.  No discharge or erythema, or obvious scleral/conjunctival abnormalities.  RESP: No audible wheeze, cough, or visible cyanosis.  No visible retractions or increased work of breathing.    SKIN: Visible skin clear. No significant rash, abnormal pigmentation or lesions.  NEURO: Cranial nerves grossly intact.  Mentation and speech appropriate for age.  PSYCH: Mentation appears normal, affect normal/bright, judgement and insight intact, normal speech and appearance well-groomed.    Last Renal Panel:  Sodium   Date Value Ref Range Status   10/03/2022 140 133 - 144 mmol/L Final   03/19/2021 137.4 132.6 - 141.4 mmol/L Final     Potassium   Date Value Ref Range Status   10/03/2022  3.6 3.4 - 5.3 mmol/L Final   03/19/2021 3.9 3.3 - 4.5 mmol/L Final     Chloride   Date Value Ref Range Status   10/03/2022 109 94 - 109 mmol/L Final     Comment:     0-20 years:       Female:  mmol/L  Male:    mmol/L       20 years and older:   Female:  mmol/L   Male:    mmol/L     03/19/2021 100.6 98.0 - 110.0 mmol/L Final     Carbon Dioxide   Date Value Ref Range Status   03/19/2021 27.8 20.0 - 32.0 mmol/L Final     Carbon Dioxide (CO2)   Date Value Ref Range Status   10/03/2022 27 20 - 32 mmol/L Final     Anion Gap   Date Value Ref Range Status   10/03/2022 4 3 - 14 mmol/L Final     Glucose   Date Value Ref Range Status   10/03/2022 93 70 - 99 mg/dL Final   03/19/2021 93.7 70.0 - 99.0 mg'dL Final     Urea Nitrogen   Date Value Ref Range Status   10/03/2022 7 7 - 30 mg/dL Final     Comment:     Female  0 to 15 days           3-23  15 days to 1 year      3-17  1 to 10 years          9-22  10 to 19 years         7-19  19 years and older     7-30    Male  0 to 15 days           3-23  15 days to 1 year      3-17  1 to 10 years          9-22  10 to 19 years         7-21  19 years and older     7-30   03/19/2021 5.2 (L) 7.0 - 19.0 mg/dL Final     Creatinine   Date Value Ref Range Status   10/03/2022 0.62 0.52 - 1.25 mg/dL Final     Comment:     20 y and older Female0.52-1.04 mg/dL  20 y and older Male0.66-1.25 mg/dL    Varies with the amount of muscle mass present.    GICH  Female: 0.6-1.2 mg/dL  Male: 0.7-1.3 mg/dL     03/19/2021 0.8 0.5 - 1.0 mg/dL Final     GFR Estimate   Date Value Ref Range Status   10/03/2022 >90 >60 mL/min/1.73m2 Final     Comment:     GFR not calculated when sex unspecified or nonbinary.  Effective December 21, 2021 eGFRcr in adults is calculated using the 2021 CKD-EPI creatinine equation which includes age and gender (Juanis et al., NEJM, DOI: 10.1056/OZMSnt8431260)   03/19/2021 >90 >60.0 mL/min/1.7 m2 Final     Calcium   Date Value Ref Range Status   10/03/2022 9.3 8.5 -  10.1 mg/dL Final   03/19/2021 9.0 8.5 - 10.1 mg/dL Final     Albumin   Date Value Ref Range Status   10/03/2022 3.9 3.4 - 5.0 g/dL Final      Lab Results   Component Value Date    WBC 8.2 10/03/2022     Lab Results   Component Value Date    RBC 4.65 10/03/2022     Lab Results   Component Value Date    HGB 13.7 10/03/2022    HGB 14.3 03/19/2021     Lab Results   Component Value Date    HCT 41.5 10/03/2022    HCT 42.5 07/28/2020     No components found for: MCT  Lab Results   Component Value Date    MCV 89 10/03/2022    MCV 93.2 07/28/2020     Lab Results   Component Value Date    MCH 29.5 10/03/2022    MCH 28.3 07/28/2020     Lab Results   Component Value Date    MCHC 33.0 10/03/2022    MCHC 30.4 07/28/2020     Lab Results   Component Value Date    RDW 13.0 10/03/2022     Lab Results   Component Value Date     10/03/2022      No results found for: A1C   TSH   Date Value Ref Range Status   11/12/2021 4.15 (H) 0.40 - 4.00 mU/L Final      No results found for: VITDT   No results for input(s): MAG in the last 92757 hours.  Last Comprehensive Metabolic Panel:  Sodium   Date Value Ref Range Status   10/03/2022 140 133 - 144 mmol/L Final   03/19/2021 137.4 132.6 - 141.4 mmol/L Final     Potassium   Date Value Ref Range Status   10/03/2022 3.6 3.4 - 5.3 mmol/L Final   03/19/2021 3.9 3.3 - 4.5 mmol/L Final     Chloride   Date Value Ref Range Status   10/03/2022 109 94 - 109 mmol/L Final     Comment:     0-20 years:       Female:  mmol/L  Male:    mmol/L       20 years and older:   Female:  mmol/L   Male:    mmol/L     03/19/2021 100.6 98.0 - 110.0 mmol/L Final     Carbon Dioxide   Date Value Ref Range Status   03/19/2021 27.8 20.0 - 32.0 mmol/L Final     Carbon Dioxide (CO2)   Date Value Ref Range Status   10/03/2022 27 20 - 32 mmol/L Final     Anion Gap   Date Value Ref Range Status   10/03/2022 4 3 - 14 mmol/L Final     Glucose   Date Value Ref Range Status   10/03/2022 93 70 - 99 mg/dL Final    03/19/2021 93.7 70.0 - 99.0 mg'dL Final     Urea Nitrogen   Date Value Ref Range Status   10/03/2022 7 7 - 30 mg/dL Final     Comment:     Female  0 to 15 days           3-23  15 days to 1 year      3-17  1 to 10 years          9-22  10 to 19 years         7-19  19 years and older     7-30    Male  0 to 15 days           3-23  15 days to 1 year      3-17  1 to 10 years          9-22  10 to 19 years         7-21  19 years and older     7-30   03/19/2021 5.2 (L) 7.0 - 19.0 mg/dL Final     Creatinine   Date Value Ref Range Status   10/03/2022 0.62 0.52 - 1.25 mg/dL Final     Comment:     20 y and older Female0.52-1.04 mg/dL  20 y and older Male0.66-1.25 mg/dL    Varies with the amount of muscle mass present.    GICH  Female: 0.6-1.2 mg/dL  Male: 0.7-1.3 mg/dL     03/19/2021 0.8 0.5 - 1.0 mg/dL Final     GFR Estimate   Date Value Ref Range Status   10/03/2022 >90 >60 mL/min/1.73m2 Final     Comment:     GFR not calculated when sex unspecified or nonbinary.  Effective December 21, 2021 eGFRcr in adults is calculated using the 2021 CKD-EPI creatinine equation which includes age and gender (Juanis et al., NEJM, DOI: 10.1056/HTUOpd9041352)   03/19/2021 >90 >60.0 mL/min/1.7 m2 Final     Calcium   Date Value Ref Range Status   10/03/2022 9.3 8.5 - 10.1 mg/dL Final   03/19/2021 9.0 8.5 - 10.1 mg/dL Final     Bilirubin Total   Date Value Ref Range Status   10/03/2022 0.4 0.2 - 1.3 mg/dL Final   03/19/2021 <0.4 0.2 - 1.3 mg/dL Final     Alkaline Phosphatase   Date Value Ref Range Status   10/03/2022 85 40 - 150 U/L Final     Comment:     Female:   0-3 years  110-320 U/L  4-9 years  150-420 U/L  10-11 years  130-560 U/L  12-13 years  105-420 U/L  14-15 years   U/L  16 years and older   U/L      Male:  0-3 years  110-320 U/L  4-9 years  150-420 U/L  10-15 years  130-530 U/L  16-19 years   U/L  20 years and older   U/L     03/19/2021 74.4 31.7 - 110.5 U/L Final     ALT   Date Value Ref Range Status    10/03/2022 21 0 - 70 U/L Final     Comment:     Female   All ages0-50 U/L    Male   0 - 19 years      0-50 U/L  20 years and older0-70 U/L          03/19/2021 18.5 0.0 - 45.0 U/L Final     AST   Date Value Ref Range Status   10/03/2022 9 0 - 45 U/L Final   03/19/2021 23.4 0.0 - 45.0 U/L Final     Most Recent D-dimer:  Recent Labs   Lab Test 10/03/22  1831   DD 0.32       Labs outside system ( [facility])  TROPONIN I (09/19/2022 8:06 PM CDT)  Lab Results - TROPONIN I (09/19/2022 8:06 PM CDT)  Component Value Ref Range Test Method Analysis Time Performed At [facility]   TROPONIN I <0.010 <0.034 ng/mL   09/19/2022 8:48 PM CDT [facility] LABORATORY       CBC WITH AUTO DIFFERENTIAL (09/19/2022 8:06 PM CDT)  Lab Results - CBC WITH AUTO DIFFERENTIAL (09/19/2022 8:06 PM CDT)  Component Value Ref Range Test Method Analysis Time Performed At [facility]   WHITE BLOOD COUNT  7.6 4.5 - 11.0 thou/cu mm   09/19/2022 8:20 PM CDT [facility] LABORAT[facility]     RED BLOOD COUNT  5.05 4.00 - 5.20 mil/cu mm   09/19/2022 8:20 PM CDT [facility] LABORAT[facility]     HEMOGLOBIN  14.9 12.0 - 16.0 g/dL   09/19/2022 8:20 PM CDT [facility] LAB[facility]     HEMATOCRIT  45.5 33.0 - 51.0 %   09/19/2022 8:20 PM CDT [facility] LAB[facility]     MCV  90 80 - 100 fL   09/19/2022 8:20 PM CDT [facility] LAB[facility]     MCH  29.5 26.0 - 34.0 pg   09/19/2022 8:20 PM CDT [facility]     MCHC  32.7 32.0 - 36.0 g/dL   09/19/2022 8:20 PM CDT [facility]     RDW  12.5 11.5 - 15.5 %   09/19/2022 8:20 PM CDT [facility] LAB[facility]     PLATELET COUNT  230 140 - 440 thou/cu mm   09/19/2022 8:20 PM CDT [facility]     MPV  10.4 6.5 - 11.0 fL   09/19/2022 8:20 PM CDT [facility]     NRBC  0.0 %   09/19/2022 8:20 PM CDT [facility]     ABS NRBC 0.0 thou /cu mm   09/19/2022 8:20 PM CDT [facility]     % NEUT 57.2 %   09/19/2022 8:20  PM CDT [facility] LABORATORY     % LYMPH 34.2 %   09/19/2022 8:20 PM CDT [facility] LABOR[facility]     % MONO 7.5 %   09/19/2022 8:20 PM CDT [facility]     % EOS 0.4 %   09/19/2022 8:20 PM CDT [facility] LABORAT[facility]     % BASO 0.4 %   09/19/2022 8:20 PM CDT [facility]     % IMMATURE GRAN (METAS,MYELOS,PROS) 0.3 %   09/19/2022 8:20 PM CDT [facility] LABOR[facility]     ABSOLUTE NEUTROPHILS  4.3 1.7 - 7.0 thou/cu mm   09/19/2022 8:20 PM CDT [facility] LABOR[facility]     ABSOLUTE LYMPHOCYTES  2.6 0.9 - 2.9 thou/cu mm   09/19/2022 8:20 PM CDT [facility]     ABSOLUTE MONOCYTES  0.6 <0.9 thou/cu mm   09/19/2022 8:20 PM CDT [facility]     ABSOLUTE EOSINOPHILS  0.0 <0.5 thou/cu mm   09/19/2022 8:20 PM CDT [facility]     ABSOLUTE BASOPHILS  0.0 <0.3 thou/cu mm   09/19/2022 8:20 PM CDT [facility]     ABSOLUTE IMMATURE GRANULOCYTES(METAS,MYELOS,PROS) 0.0 <0.3 thou/cu mm   09/19/2022 8:20 PM CDT [facility] LABORAT[facility]       Lab Results - CBC WITH AUTO DIFFERENTIAL (09/19/2022 8:06 PM CDT)  Specimen (Source) Anatomical Location / Laterality Collection Method / Volume Collection Time Received Time   Blood BLOOD SPECIMEN / Unknown Non-Lab Venipuncture / Unknown 09/19/2022 8:06 PM CDT 09/19/2022 8:16 PM CDT     D-DIMER,QUANTITATIVE (09/19/2022 9:44 PM CDT)  Lab Results - D-DIMER,QUANTITATIVE (09/19/2022 9:44 PM CDT)  Component Value Ref Range Test Method Analysis Time Performed At [facility]   D-DIMER,QUANTITATIVE  0.38 <0.50 FEU mcg/mL FEU mcg/mL   09/19/2022 10:06 PM CDT [facility]       Lab Results - D-DIMER,QUANTITATIVE (09/19/2022 9:44 PM CDT)  Specimen (Source) Anatomical Location / Laterality Collection Method / Volume Collection Time Received Time   Blood BLOOD SPECIMEN / Unknown Non-Lab Venipuncture / Unknown 09/19/2022 9:44 PM             [facility]  EXAM: XR CHEST 2 VIEWS  PA AND LATERAL  LOCATION: [facility] MEDICAL IMAGING  DATE/TIME: 9/19/2022 7:44 PM    INDICATION: CHEST PAIN COVID-19 POSITIVE RESULT  COMPARISON: None.    IMPRESSION:  Negative chest.       Video-Visit Details    Video visit Start time:09:35 AM    Type of service:  Video Visit    Video End Time:10:03 AM    Originating Location (pt. Location): Home    Distant Location (provider location):  HOME    Platform used for Video Visit: [facility]

## 2022-10-21 NOTE — LETTER
10/21/2022       RE: Elva Aaron  [address]  [place] [address]     Dear Colleague,    Thank you for referring your patient, Elva Aaron, to the [facility] PHYSICAL MEDICINE AND REHABILITATION CLINIC [place] [facility]. Please see a copy of my visit note below.    Assessment/ Impression:   1. Post-COVID chronic dyspnea  Patient with lingering shortness of breath which has improved from his intial infection.  Discussed breathing exercises and encouraged to take cough medicine to help limit his cough. Discussed pathology of pleurisy and stated if cough not improved in a week should reach out to his primary care for guidance.  If dyspnea not improved at follow up will refer to pulmonology or ENT based on lingering syptoms. Discussed he may need Flonase due to post nasal drainage which can aggravate cough. He will reach out if this continues for a prescription.    2. Post-COVID chronic fatigue  Patient with fatigue after only a few tasks. Discussed energy conservation and provided information on fatigue management.  Also discussed referral to Occupational therapy for the COVID-19 program. Will also check B12 and TSH  - Vitamin B12; Future  - Occupational Therapy Referral; Future  - TSH with free T4 reflex; Future    3. Myalgia  Patient has muscle aches mostly in his upper back, neck and arms. Discussed doing occupational therapy as he develops fatigue with most use of his arms.     4. Post-acute sequelae of SARS-CoV-2 infection  Discussed COVID and Post COVID with patient.  Educational materials provided and all questions answered. Discussed receiving his booster vaccine and will discussed at next visit.  - Adult Post Covid Clinic Referral    Plan:  1. I reviewed present knowledge on long-Covid.  Education was provided and question were answered.  2. Orders/Referrals as above  3. Will advised patient on test results  4. I will follow up with  "Elva \"Zed\" Agnieszka in 6 weeks. I will review progress and consider need for any other therapeutic interventions. If there are any questions and/or concerns he will call the clinic.      On day of encounter time spent in chart review and with patient in consultation, exam, education, coordination of care, review of outside charts/data and documentation:  60 minutes     I have attempted to proof read for major spelling errors and apologize for any minor errors I may have missed.             _________________________________  DENVER Cassidy [facility] PHYSICAL MEDICINE AND REHABILITATION CLINIC [facility]   This 27 year old adult presents to the [facility] Rehabilitation Medicine Post-COVID clinic as a new consult to evaluate continuing symptoms after COVID infection initially diagnosed 9/15/22.  Elva \"Zed\" Agnieszka presented to ED on 9/19/22 complaining of sore throat, rhinorrhea, shortness of breath, cough, chest pain, fever, chills, fatigue and weakness. Treatment was symptomatic, albuterol.  Elva \"Zed\" Agnieszka experienced complications of shortness of breath.  Continuing symptoms include fatigue, generalized aches, cough, shortness of breath and headache.  Patient has shortness of breath with exertion.  He will feel winded walking up stairs.  He does feel this is improving.   He has intermittent chest pain as well. He feels the pain mostly on the left side and  He has been taking Advil for his pain.   Patient is still coughing as well with exertion. Patient will feel tired during the day. He will either wake up feeling tired or feel tired after 1-2 activities.  Patient is having has shoulder, neck and arms.  Patient will get muscle fatigue very quickly.   Past medical history is significant for Migraines, depression and anxiety. The patient was vaccinated against COVID X3.  Previous activity was unemployed, .     History of COVID-19 infection: 9/15/22  Date of " first symptoms: 9/13/22  Diagnosis: antigen  Hospitalization: No  Treatment: symptomatic  Current Symptoms: See subjective  Goals of Care: increase energy, decrease shortness of breath, decrease coughing, decrease chest pain and improve quality of life      PHQ Assesment Total Score(s) 10/21/2022   PHQ-9 Score 4   Some recent data might be hidden     ARIK-7 Results 10/21/2022   ARIK 7 TOTAL SCORE 0 (minimal anxiety)   ARIK-7 Total Score 0   Some recent data might be hidden     PTSD Screen Score 10/21/2022   Have you ever experienced this kind of event? No   Some recent data might be hidden     PROMIS 29  Scoring Physical Function (higher score better) (range: 4 - 20) 14 (   Sum of 4 Physical Function Questions)   Scoring Anxiety (lower score better) (range: 4 - 20) 4 (   Sum of 4 Anxiety Questions)   Scoring Depression (lower score better) (range: 4 - 20) 4 (   Sum of 4 Depression Questions)   Scoring Fatigue (lower score better) (range: 4 - 20) 16 (   Sum of 4 Fatigue [place] )   Scoring Sleep Disturbance (lower score better) (range: 4 - 20) 14 (   Sum of 4 Sleep Disturbance Questions)   Scoring Satisfaction with Social Role (higher score better) (range: 4 - 20) 8 (   Sum of 4 Satisfaction with Social Role Questions)   Scoring Pain Interference (lower score better) (range: 4 - 20) 15 (   Sum of 4 Pain Interference Questions)       No past medical history on file.    Past Surgical History:   Procedure Laterality Date     ADENOIDECTOMY       COSMETIC PLACEMENT OF DENTAL IMPLANT(S)       HC TOOTH EXTRACTION W/FORCEP       MASTECTOMY SIMPLE BILATERAL Bilateral 12/17/2021    Procedure: MASTECTOMY, BILATERAL, SIMPLE, WITH nipple grafts. OnQ;  Surgeon: Lashanda Mulligan MD;  Location: [facility] OR     TONSILLECTOMY         Family History   Problem Relation Age of Onset     Clotting Disorder Maternal Grandmother      Cancer Maternal Grandfather         Colon Ca     Thyroid Disease Mother        Social History     Tobacco Use  "    Smoking status: Never     Smokeless tobacco: Never   Substance Use Topics     Alcohol use: Yes     Comment: occ.     Drug use: Never         Current Outpatient Medications:      acetaminophen (TYLENOL) 325 MG tablet, Take 325-650 mg by mouth every 6 hours as needed for mild pain, Disp: , Rfl:      albuterol (PROAIR HFA) 108 (90 Base) MCG/ACT inhaler, Inhale 2 puffs into the lungs every 4 hours as needed for shortness of breath / dyspnea, Disp: 8 g, Rfl: 0     amphetamine-dextroamphetamine (ADDERALL XR) 10 MG 24 hr capsule, Take 1 capsule (10 mg) by mouth daily (Patient not taking: No sig reported), Disp: 30 capsule, Rfl: 0     benzonatate (TESSALON) 100 MG capsule, Take 100 mg by mouth (Patient not taking: Reported on 10/6/2022), Disp: , Rfl:      hydrOXYzine (ATARAX) 25 MG tablet, Take 1 tablet (25 mg) by mouth 3 times daily as needed for itching (Patient not taking: Reported on 10/6/2022), Disp: 20 tablet, Rfl: 0     ibuprofen (ADVIL/MOTRIN) 200 MG capsule, Take 200 mg by mouth every 4 hours as needed for fever , Disp: , Rfl:      lidocaine-prilocaine (EMLA) 2.5-2.5 % external cream, Apply topically as needed for moderate pain Apply to symptomatic hypertrophic scars prior to steroid injections. Apply within 30-45 minutes prior to appointment. Cover with Tegaderm or Saran Wrap. (Patient not taking: Reported on 10/6/2022), Disp: 30 g, Rfl: 1     Needle, Disp, (BD DISP NEEDLE) 23G X 1\" MISC, 1 Units once a week (Patient not taking: Reported on 10/6/2022), Disp: 50 each, Rfl: 1     Needle, Disp, (BD SAFETYGLIDE NEEDLE) 27G X 5/8\" MISC, USE ONE NEEDLE ONCE WEEKLY (Patient not taking: Reported on 10/6/2022), Disp: 50 each, Rfl: 1     ondansetron (ZOFRAN-ODT) 4 MG ODT tab, Take 1 tablet (4 mg) by mouth every 8 hours as needed for nausea (Patient not taking: Reported on 10/6/2022), Disp: 20 tablet, Rfl: 0     SUMAtriptan (IMITREX) 25 MG tablet, Take 25 mg by mouth at onset of headache for migraine, Disp: , Rfl:      " syringe, disposable, 1 ML MISC, 1 Units once a week (Patient not taking: Reported on 10/6/2022), Disp: 60 each, Rfl: 1     testosterone cypionate (DEPOTESTOSTERONE) 200 MG/ML injection, Inject 0.4 mLs (80 mg) into the muscle once a week Inject 0.4mLs (80mg) into the muscle once a week. (Patient not taking: Reported on 10/6/2022), Disp: 10 mL, Rfl: 5      Answers to ROS/HPI submitted by patient on 10/21/22  Review of Systems   Constitutional: Positive for fatigue. Negative for chills and fever.   HENT: Positive for sore throat and tinnitus. Negative for ear discharge, ear pain, hearing loss, mouth sores, nosebleeds, sinus pain and trouble swallowing.    Eyes: Negative for pain and redness.   Respiratory: Positive for cough and shortness of breath. Negative for wheezing.    Gastrointestinal: Positive for constipation and rectal pain. Negative for abdominal pain, blood in stool, diarrhea, nausea and vomiting.   Endocrine: Negative for polydipsia and polyphagia.   Musculoskeletal: Positive for arthralgias, back pain, myalgias and neck pain. Negative for joint swelling.   Psychiatric/Behavioral: Negative for hallucinations.          Objective    Vitals:  No vitals were obtained today due to virtual visit.    Physical Exam   GENERAL: Healthy, alert and no distress  EYES: Eyes grossly normal to inspection.  No discharge or erythema, or obvious scleral/conjunctival abnormalities.  RESP: No audible wheeze, cough, or visible cyanosis.  No visible retractions or increased work of breathing.    SKIN: Visible skin clear. No significant rash, abnormal pigmentation or lesions.  NEURO: Cranial nerves grossly intact.  Mentation and speech appropriate for age.  PSYCH: Mentation appears normal, affect normal/bright, judgement and insight intact, normal speech and appearance well-groomed.    Last Renal Panel:  Sodium   Date Value Ref Range Status   10/03/2022 140 133 - 144 mmol/L Final   03/19/2021 137.4 132.6 - 141.4 mmol/L Final      Potassium   Date Value Ref Range Status   10/03/2022 3.6 3.4 - 5.3 mmol/L Final   03/19/2021 3.9 3.3 - 4.5 mmol/L Final     Chloride   Date Value Ref Range Status   10/03/2022 109 94 - 109 mmol/L Final     Comment:     0-20 years:       Female:  mmol/L  Male:    mmol/L       20 years and older:   Female:  mmol/L   Male:    mmol/L     03/19/2021 100.6 98.0 - 110.0 mmol/L Final     Carbon Dioxide   Date Value Ref Range Status   03/19/2021 27.8 20.0 - 32.0 mmol/L Final     Carbon Dioxide (CO2)   Date Value Ref Range Status   10/03/2022 27 20 - 32 mmol/L Final     Anion Gap   Date Value Ref Range Status   10/03/2022 4 3 - 14 mmol/L Final     Glucose   Date Value Ref Range Status   10/03/2022 93 70 - 99 mg/dL Final   03/19/2021 93.7 70.0 - 99.0 mg'dL Final     Urea Nitrogen   Date Value Ref Range Status   10/03/2022 7 7 - 30 mg/dL Final     Comment:     Female  0 to 15 days           3-23  15 days to 1 year      3-17  1 to 10 years          9-22  10 to 19 years         7-19  19 years and older     7-30    Male  0 to 15 days           3-23  15 days to 1 year      3-17  1 to 10 years          9-22  10 to 19 years         7-21  19 years and older     7-30   03/19/2021 5.2 (L) 7.0 - 19.0 mg/dL Final     Creatinine   Date Value Ref Range Status   10/03/2022 0.62 0.52 - 1.25 mg/dL Final     Comment:     20 y and older Female0.52-1.04 mg/dL  20 y and older Male0.66-1.25 mg/dL    Varies with the amount of muscle mass present.    GICH  Female: 0.6-1.2 mg/dL  Male: 0.7-1.3 mg/dL     03/19/2021 0.8 0.5 - 1.0 mg/dL Final     GFR Estimate   Date Value Ref Range Status   10/03/2022 >90 >60 mL/min/1.73m2 Final     Comment:     GFR not calculated when sex unspecified or nonbinary.  Effective December 21, 2021 eGFRcr in adults is calculated using the 2021 CKD-EPI creatinine equation which includes age and gender (Juanis et al., NEJM, DOI: 10.1056/GUWSlr2455128)   03/19/2021 >90 >60.0 mL/min/1.7 m2 Final      Calcium   Date Value Ref Range Status   10/03/2022 9.3 8.5 - 10.1 mg/dL Final   03/19/2021 9.0 8.5 - 10.1 mg/dL Final     Albumin   Date Value Ref Range Status   10/03/2022 3.9 3.4 - 5.0 g/dL Final      Lab Results   Component Value Date    WBC 8.2 10/03/2022     Lab Results   Component Value Date    RBC 4.65 10/03/2022     Lab Results   Component Value Date    HGB 13.7 10/03/2022    HGB 14.3 03/19/2021     Lab Results   Component Value Date    HCT 41.5 10/03/2022    HCT 42.5 07/28/2020     No components found for: MCT  Lab Results   Component Value Date    MCV 89 10/03/2022    MCV 93.2 07/28/2020     Lab Results   Component Value Date    MCH 29.5 10/03/2022    MCH 28.3 07/28/2020     Lab Results   Component Value Date    MCHC 33.0 10/03/2022    MCHC 30.4 07/28/2020     Lab Results   Component Value Date    RDW 13.0 10/03/2022     Lab Results   Component Value Date     10/03/2022      No results found for: A1C   TSH   Date Value Ref Range Status   11/12/2021 4.15 (H) 0.40 - 4.00 mU/L Final      No results found for: VITDT   No results for input(s): MAG in the last 60590 hours.  Last Comprehensive Metabolic Panel:  Sodium   Date Value Ref Range Status   10/03/2022 140 133 - 144 mmol/L Final   03/19/2021 137.4 132.6 - 141.4 mmol/L Final     Potassium   Date Value Ref Range Status   10/03/2022 3.6 3.4 - 5.3 mmol/L Final   03/19/2021 3.9 3.3 - 4.5 mmol/L Final     Chloride   Date Value Ref Range Status   10/03/2022 109 94 - 109 mmol/L Final     Comment:     0-20 years:       Female:  mmol/L  Male:    mmol/L       20 years and older:   Female:  mmol/L   Male:    mmol/L     03/19/2021 100.6 98.0 - 110.0 mmol/L Final     Carbon Dioxide   Date Value Ref Range Status   03/19/2021 27.8 20.0 - 32.0 mmol/L Final     Carbon Dioxide (CO2)   Date Value Ref Range Status   10/03/2022 27 20 - 32 mmol/L Final     Anion Gap   Date Value Ref Range Status   10/03/2022 4 3 - 14 mmol/L Final     Glucose    Date Value Ref Range Status   10/03/2022 93 70 - 99 mg/dL Final   03/19/2021 93.7 70.0 - 99.0 mg'dL Final     Urea Nitrogen   Date Value Ref Range Status   10/03/2022 7 7 - 30 mg/dL Final     Comment:     Female  0 to 15 days           3-23  15 days to 1 year      3-17  1 to 10 years          9-22  10 to 19 years         7-19  19 years and older     7-30    Male  0 to 15 days           3-23  15 days to 1 year      3-17  1 to 10 years          9-22  10 to 19 years         7-21  19 years and older     7-30   03/19/2021 5.2 (L) 7.0 - 19.0 mg/dL Final     Creatinine   Date Value Ref Range Status   10/03/2022 0.62 0.52 - 1.25 mg/dL Final     Comment:     20 y and older Female0.52-1.04 mg/dL  20 y and older Male0.66-1.25 mg/dL    Varies with the amount of muscle mass present.    GICH  Female: 0.6-1.2 mg/dL  Male: 0.7-1.3 mg/dL     03/19/2021 0.8 0.5 - 1.0 mg/dL Final     GFR Estimate   Date Value Ref Range Status   10/03/2022 >90 >60 mL/min/1.73m2 Final     Comment:     GFR not calculated when sex unspecified or nonbinary.  Effective December 21, 2021 eGFRcr in adults is calculated using the 2021 CKD-EPI creatinine equation which includes age and gender (Juanis et al., NEJ, DOI: 10.1056/KQKAnn8707296)   03/19/2021 >90 >60.0 mL/min/1.7 m2 Final     Calcium   Date Value Ref Range Status   10/03/2022 9.3 8.5 - 10.1 mg/dL Final   03/19/2021 9.0 8.5 - 10.1 mg/dL Final     Bilirubin Total   Date Value Ref Range Status   10/03/2022 0.4 0.2 - 1.3 mg/dL Final   03/19/2021 <0.4 0.2 - 1.3 mg/dL Final     Alkaline Phosphatase   Date Value Ref Range Status   10/03/2022 85 40 - 150 U/L Final     Comment:     Female:   0-3 years  110-320 U/L  4-9 years  150-420 U/L  10-11 years  130-560 U/L  12-13 years  105-420 U/L  14-15 years   U/L  16 years and older   U/L      Male:  0-3 years  110-320 U/L  4-9 years  150-420 U/L  10-15 years  130-530 U/L  16-19 years   U/L  20 years and older   U/L     03/19/2021 74.4  31.7 - 110.5 U/L Final     ALT   Date Value Ref Range Status   10/03/2022 21 0 - 70 U/L Final     Comment:     Female   All ages0-50 U/L    Male   0 - 19 years      0-50 U/L  20 years and older0-70 U/L          03/19/2021 18.5 0.0 - 45.0 U/L Final     AST   Date Value Ref Range Status   10/03/2022 9 0 - 45 U/L Final   03/19/2021 23.4 0.0 - 45.0 U/L Final     Most Recent D-dimer:  Recent Labs   Lab Test 10/03/22  1831   DD 0.32       Labs outside system ( [facility])  TROPONIN I (09/19/2022 8:06 PM CDT)  Lab Results - TROPONIN I (09/19/2022 8:06 PM CDT)  Component Value Ref Range Test Method Analysis Time Performed At [facility]   TROPONIN I <0.010 <0.034 ng/mL   09/19/2022 8:48 PM CDT [facility] LABORATORY       CBC WITH AUTO DIFFERENTIAL (09/19/2022 8:06 PM CDT)  Lab Results - CBC WITH AUTO DIFFERENTIAL (09/19/2022 8:06 PM CDT)  Component Value Ref Range Test Method Analysis Time Performed At [facility]   WHITE BLOOD COUNT  7.6 4.5 - 11.0 thou/cu mm   09/19/2022 8:20 PM CDT [facility] LABORATORY     RED BLOOD COUNT  5.05 4.00 - 5.20 mil/cu mm   09/19/2022 8:20 PM CDT [facility] LABORATORY     HEMOGLOBIN  14.9 12.0 - 16.0 g/dL   09/19/2022 8:20 PM CDT [facility] LABORATORY     HEMATOCRIT  45.5 33.0 - 51.0 %   09/19/2022 8:20 PM CDT [facility] LABORATORY     MCV  90 80 - 100 fL   09/19/2022 8:20 PM CDT [facility] LABORATORY     MCH  29.5 26.0 - 34.0 pg   09/19/2022 8:20 PM CDT [facility] LABORATORY     MCHC  32.7 32.0 - 36.0 g/dL   09/19/2022 8:20 PM CDT [facility] LABORAT[facility]     RDW  12.5 11.5 - 15.5 %   09/19/2022 8:20 PM CDT [facility] LABORATORY     PLATELET COUNT  230 140 - 440 thou/cu mm   09/19/2022 8:20 PM CDT [facility] LABORATORY     MPV  10.4 6.5 - 11.0 fL   09/19/2022 8:20 PM CDT [facility] LABORATORY     NRBC  0.0 %   09/19/2022 8:20 PM CDT [facility] LABORATORY     ABS NRBC 0.0 thou /cu mm   09/19/2022 8:20 PM CDT  [facility] LABORATORY     % NEUT 57.2 %   09/19/2022 8:20 PM CDT [facility] LABORAT[facility]     % LYMPH 34.2 %   09/19/2022 8:20 PM CDT [facility] LABORATORY     % MONO 7.5 %   09/19/2022 8:20 PM CDT [facility] LABORAT[facility]     % EOS 0.4 %   09/19/2022 8:20 PM CDT [facility] LABORATORY     % BASO 0.4 %   09/19/2022 8:20 PM CDT [facility] LABORAT[facility]     % IMMATURE GRAN (METAS,MYELOS,PROS) 0.3 %   09/19/2022 8:20 PM CDT [facility] LABORATORY     ABSOLUTE NEUTROPHILS  4.3 1.7 - 7.0 thou/cu mm   09/19/2022 8:20 PM CDT [facility] LABORATORY     ABSOLUTE LYMPHOCYTES  2.6 0.9 - 2.9 thou/cu mm   09/19/2022 8:20 PM CDT [facility] LABORATORY     ABSOLUTE MONOCYTES  0.6 <0.9 thou/cu mm   09/19/2022 8:20 PM CDT [facility] LABORAT[facility]     ABSOLUTE EOSINOPHILS  0.0 <0.5 thou/cu mm   09/19/2022 8:20 PM CDT [facility] LABORATORY     ABSOLUTE BASOPHILS  0.0 <0.3 thou/cu mm   09/19/2022 8:20 PM CDT [facility] LABORAT[facility]     ABSOLUTE IMMATURE GRANULOCYTES(METAS,MYELOS,PROS) 0.0 <0.3 thou/cu mm   09/19/2022 8:20 PM CDT [facility] LABORATORY       Lab Results - CBC WITH AUTO DIFFERENTIAL (09/19/2022 8:06 PM CDT)  Specimen (Source) Anatomical Location / Laterality Collection Method / Volume Collection Time Received Time   Blood BLOOD SPECIMEN / Unknown Non-Lab Venipuncture / Unknown 09/19/2022 8:06 PM CDT 09/19/2022 8:16 PM CDT     D-DIMER,QUANTITATIVE (09/19/2022 9:44 PM CDT)  Lab Results - D-DIMER,QUANTITATIVE (09/19/2022 9:44 PM CDT)  Component Value Ref Range Test Method Analysis Time Performed At [facility]   D-DIMER,QUANTITATIVE  0.38 <0.50 FEU mcg/mL FEU mcg/mL   09/19/2022 10:06 PM CDT [facility] LABORATORY       Lab Results - D-DIMER,QUANTITATIVE (09/19/2022 9:44 PM CDT)  Specimen (Source) Anatomical Location / Laterality Collection Method / Volume Collection Time Received Time   Blood BLOOD SPECIMEN / Unknown Non-Lab Venipuncture / Unknown 09/19/2022 9:44  PM             [facility]  EXAM: XR CHEST 2 VIEWS PA AND LATERAL  LOCATION: [facility] MEDICAL IMAGING  DATE/TIME: 9/19/2022 7:44 PM    INDICATION: CHEST PAIN COVID-19 POSITIVE RESULT  COMPARISON: None.    IMPRESSION:  Negative chest.             Sincerely,    Myla Crook PA-C

## 2022-12-05 ENCOUNTER — VIRTUAL VISIT (OUTPATIENT)
Dept: PHYSICAL MEDICINE AND REHAB | Facility: CLINIC | Age: 27
End: 2022-12-05
Payer: COMMERCIAL

## 2022-12-05 DIAGNOSIS — R09.82 POST-NASAL DRAINAGE: Primary | ICD-10-CM

## 2022-12-05 DIAGNOSIS — Z86.16 POST-COVID SYNDROME RESOLVED: ICD-10-CM

## 2022-12-05 PROCEDURE — 99214 OFFICE O/P EST MOD 30 MIN: CPT | Mod: GT | Performed by: PHYSICIAN ASSISTANT

## 2022-12-05 RX ORDER — FLUTICASONE PROPIONATE 50 MCG
1 SPRAY, SUSPENSION (ML) NASAL DAILY
Qty: 9.9 ML | Refills: 0 | Status: SHIPPED | OUTPATIENT
Start: 2022-12-05

## 2022-12-05 ASSESSMENT — ANXIETY QUESTIONNAIRES
2. NOT BEING ABLE TO STOP OR CONTROL WORRYING: NOT AT ALL
GAD7 TOTAL SCORE: 2
6. BECOMING EASILY ANNOYED OR IRRITABLE: SEVERAL DAYS
5. BEING SO RESTLESS THAT IT IS HARD TO SIT STILL: NOT AT ALL
1. FEELING NERVOUS, ANXIOUS, OR ON EDGE: NOT AT ALL
7. FEELING AFRAID AS IF SOMETHING AWFUL MIGHT HAPPEN: NOT AT ALL
3. WORRYING TOO MUCH ABOUT DIFFERENT THINGS: NOT AT ALL
4. TROUBLE RELAXING: SEVERAL DAYS
7. FEELING AFRAID AS IF SOMETHING AWFUL MIGHT HAPPEN: NOT AT ALL
GAD7 TOTAL SCORE: 2
GAD7 TOTAL SCORE: 2
8. IF YOU CHECKED OFF ANY PROBLEMS, HOW DIFFICULT HAVE THESE MADE IT FOR YOU TO DO YOUR WORK, TAKE CARE OF THINGS AT HOME, OR GET ALONG WITH OTHER PEOPLE?: SOMEWHAT DIFFICULT
IF YOU CHECKED OFF ANY PROBLEMS ON THIS QUESTIONNAIRE, HOW DIFFICULT HAVE THESE PROBLEMS MADE IT FOR YOU TO DO YOUR WORK, TAKE CARE OF THINGS AT HOME, OR GET ALONG WITH OTHER PEOPLE: SOMEWHAT DIFFICULT

## 2022-12-05 ASSESSMENT — PATIENT HEALTH QUESTIONNAIRE - PHQ9
SUM OF ALL RESPONSES TO PHQ QUESTIONS 1-9: 1
SUM OF ALL RESPONSES TO PHQ QUESTIONS 1-9: 1
10. IF YOU CHECKED OFF ANY PROBLEMS, HOW DIFFICULT HAVE THESE PROBLEMS MADE IT FOR YOU TO DO YOUR WORK, TAKE CARE OF THINGS AT HOME, OR GET ALONG WITH OTHER PEOPLE: SOMEWHAT DIFFICULT

## 2022-12-05 ASSESSMENT — ENCOUNTER SYMPTOMS
POSTURAL DYSPNEA: 0
SINUS PAIN: 1
SHORTNESS OF BREATH: 0
WHEEZING: 1
SINUS CONGESTION: 1
COUGH DISTURBING SLEEP: 1
TROUBLE SWALLOWING: 0
TASTE DISTURBANCE: 0
COUGH: 1
HEMOPTYSIS: 0
SMELL DISTURBANCE: 0
DYSPNEA ON EXERTION: 0
SPUTUM PRODUCTION: 0
SNORES LOUDLY: 0
HOARSE VOICE: 0
SORE THROAT: 0
NECK MASS: 0

## 2022-12-05 NOTE — PROGRESS NOTES
Elder is a 27 year old who is being evaluated via a billable telephone visit.      Patient denies any changes since echeck-in regarding medication and allergies and states all information entered during echeck-in remains accurate.    What phone number would you like to be contacted at? [phone]  How would you like to obtain your AVS? [place]      Assessment/Impression   1. Post-nasal drainage  Patient still with post nasal drainage and a history of allergies. Discussed this is likely more the factor behind his cough as it is intermittent and mostly when heating turns on.  Discussed Flonase and side effects.  Encouraged to try for 14 days and if not improved to reach out to PCP.   - fluticasone (FLONASE) 50 MCG/ACT nasal spray; Spray 1 spray into both nostrils daily  Dispense: 9.9 mL; Refill: 0    2. Post-COVID syndrome resolved  Patient with recovered dyspnea, fatigue and improving cough.  Advised to reach out if he symptoms worsened again.       Plan:  1. I reviewed present knowledge on long-Covid.  Education was provided and question were answered.  2. Orders/Referrals as above  3. I will advised patient on test results  4. I will follow up with Elva CORLEY. I will review progress and consider need for any other therapeutic interventions. If there are any questions and/or concerns he will call the clinic.      On day of encounter time spent in chart review and with patient in consultation, exam, education,coordination of care,  review of outside charts/documentation and documentation:  30 minutes     I have attempted to proof read for major spelling errors and apologize for any minor errors I may have missed.      _________________________________  Myla Crook PA-C  [facility] PHYSICAL MEDICINE AND REHABILITATION CLINIC [place]    Subjective   Previously: 10/21/22   This 27 year old adult presents to the [facility] Post-COVID clinic as a new consult  "to evaluate continuing symptoms after COVID infection initially diagnosed 9/15/22.  Elva \"Zed\" Agnieszka presented to ED on 9/19/22 complaining of sore throat, rhinorrhea, shortness of breath, cough, chest pain, fever, chills, fatigue and weakness. Treatment was symptomatic, albuterol.  Elva \"Zed\" Agnieszka experienced complications of shortness of breath.  Continuing symptoms include fatigue, generalized aches, cough, shortness of breath and headache.  Patient has shortness of breath with exertion.  He will feel winded walking up stairs.  He does feel this is improving.   He has intermittent chest pain as well. He feels the pain mostly on the left side and  He has been taking Advil for his pain.   Patient is still coughing as well with exertion. Patient will feel tired during the day. He will either wake up feeling tired or feel tired after 1-2 activities.  Patient is having has shoulder, neck and arms.  Patient will get muscle fatigue very quickly.   Past medical history is significant for Migraines, depression and anxiety. The patient was vaccinated against COVID X3.  Previous activity was unemployed    Today:   Patient returns for a follow up. Patient still has a lingering cough that cough medicine is helping him. Taking medicine 2-3x since last visit.  He noticed when his heat kicks on will have a cough.   Dry and slightly wheezy per patient.  He is still having post nasal drainage.  Patient wish to try Flonase as he is having lingering nasal drainage.  He does feel his fatigue has improved as well.     Current Symptoms:  Cough: improving  Fatigue (functional assessment based on ADLS): improved    Goals of Care: continue to improve cough    Current concerns: No    PHQ Assesment Total Score(s) 12/5/2022   PHQ-9 Score 1   Some recent data might be hidden     ARIK-7 Results 12/5/2022   ARIK 7 TOTAL SCORE 2 (minimal anxiety)   ARIK-7 Total Score 2   Some recent data might be hidden     PTSD Screen Score 12/5/2022 "   Have you ever experienced this kind of event? No   Some recent data might be hidden      PROMIS 29                                                12/5/22                                                10/21/22  Scoring Physical Function (higher score better) (range: 4 - 20) 20 (   Sum of 4 Physical Function Questions) 14 (   Sum of 4 Physical Function Questions)   Scoring Anxiety (lower score better) (range: 4 - 20) 4 (   Sum of 4 Anxiety Questions) 4 (   Sum of 4 Anxiety Questions)   Scoring Depression (lower score better) (range: 4 - 20) 4 (   Sum of 4 Depression Questions) 4 (   Sum of 4 Depression Questions)   Scoring Fatigue (lower score better) (range: 4 - 20) 10 (   Sum of 4 Fatigue Questons ) 16 (   Sum of 4 Fatigue Questons )   Scoring Sleep Disturbance (lower score better) (range: 4 - 20) 9 (   Sum of 4 Sleep Disturbance Questions) 14 (   Sum of 4 Sleep Disturbance Questions)   Scoring Satisfaction with Social Role (higher score better) (range: 4 - 20) 12 (   Sum of 4 Satisfaction with Social Role Questions) 8 (   Sum of 4 Satisfaction with Social Role Questions)   Scoring Pain Interference (lower score better) (range: 4 - 20) 5 (   Sum of 4 Pain Interference Questions) 15 (   Sum of 4 Pain Interference Questions)     No past medical history on file.    Past Surgical History:   Procedure Laterality Date     ADENOIDECTOMY       COSMETIC PLACEMENT OF DENTAL IMPLANT(S)       HC TOOTH EXTRACTION W/FORCEP       MASTECTOMY SIMPLE BILATERAL Bilateral 12/17/2021    Procedure: MASTECTOMY, BILATERAL, SIMPLE, WITH nipple grafts. OnQ;  Surgeon: Lashanda Mulligan MD;  Location: [facility] OR     TONSILLECTOMY         Family History   Problem Relation Age of Onset     Clotting Disorder Maternal Grandmother      Cancer Maternal Grandfather         Colon Ca     Thyroid Disease Mother        Social History     Tobacco Use     Smoking status: Never     Smokeless tobacco: Never   Substance Use Topics     Alcohol use: Yes     " Comment: occ.     Drug use: Never         Current Outpatient Medications:      acetaminophen (TYLENOL) 325 MG tablet, Take 325-650 mg by mouth every 6 hours as needed for mild pain, Disp: , Rfl:      albuterol (PROAIR HFA) 108 (90 Base) MCG/ACT inhaler, Inhale 2 puffs into the lungs every 4 hours as needed for shortness of breath / dyspnea, Disp: 8 g, Rfl: 0     amphetamine-dextroamphetamine (ADDERALL XR) 10 MG 24 hr capsule, Take 1 capsule (10 mg) by mouth daily (Patient not taking: Reported on 10/4/2022), Disp: 30 capsule, Rfl: 0     benzonatate (TESSALON) 100 MG capsule, Take 100 mg by mouth (Patient not taking: Reported on 10/6/2022), Disp: , Rfl:      hydrOXYzine (ATARAX) 25 MG tablet, Take 1 tablet (25 mg) by mouth 3 times daily as needed for itching (Patient not taking: Reported on 10/6/2022), Disp: 20 tablet, Rfl: 0     ibuprofen (ADVIL/MOTRIN) 200 MG capsule, Take 200 mg by mouth every 4 hours as needed for fever , Disp: , Rfl:      lidocaine-prilocaine (EMLA) 2.5-2.5 % external cream, Apply topically as needed for moderate pain Apply to symptomatic hypertrophic scars prior to steroid injections. Apply within 30-45 minutes prior to appointment. Cover with Tegaderm or Saran Wrap. (Patient not taking: Reported on 10/6/2022), Disp: 30 g, Rfl: 1     Needle, Disp, (BD DISP NEEDLE) 23G X 1\" MISC, 1 Units once a week (Patient not taking: Reported on 10/6/2022), Disp: 50 each, Rfl: 1     Needle, Disp, (BD SAFETYGLIDE NEEDLE) 27G X 5/8\" MISC, USE ONE NEEDLE ONCE WEEKLY (Patient not taking: Reported on 10/6/2022), Disp: 50 each, Rfl: 1     ondansetron (ZOFRAN-ODT) 4 MG ODT tab, Take 1 tablet (4 mg) by mouth every 8 hours as needed for nausea (Patient not taking: Reported on 10/6/2022), Disp: 20 tablet, Rfl: 0     SUMAtriptan (IMITREX) 25 MG tablet, Take 25 mg by mouth at onset of headache for migraine, Disp: , Rfl:      syringe, disposable, 1 ML MISC, 1 Units once a week (Patient not taking: Reported on 10/6/2022), " Disp: 60 each, Rfl: 1     testosterone cypionate (DEPOTESTOSTERONE) 200 MG/ML injection, Inject 0.4 mLs (80 mg) into the muscle once a week Inject 0.4mLs (80mg) into the muscle once a week. (Patient not taking: Reported on 10/6/2022), Disp: 10 mL, Rfl: 5    Answers to ROS/HPI submitted by patient on 12/5/22  Review of Systems   HENT: Positive for sinus pain. Negative for ear discharge, ear pain, hearing loss, mouth sores, nosebleeds, sore throat, tinnitus and trouble swallowing.    Respiratory: Positive for cough and wheezing. Negative for shortness of breath.           Objective         Vitals:  No vitals were obtained today due to virtual visit.    Physical Exam   GENERAL: Healthy, alert and no distress  RESP: No audible wheeze, or cough, able to speak in full sentences  PSYCH: Mentation appears normal, affect normal/bright, judgement and insight intact, normal speech  .  Remainder of exam unable to be completed due to telephone visit        Last Renal Panel:  Sodium   Date Value Ref Range Status   10/03/2022 140 133 - 144 mmol/L Final   03/19/2021 137.4 132.6 - 141.4 mmol/L Final     Potassium   Date Value Ref Range Status   10/03/2022 3.6 3.4 - 5.3 mmol/L Final   03/19/2021 3.9 3.3 - 4.5 mmol/L Final     Chloride   Date Value Ref Range Status   10/03/2022 109 94 - 109 mmol/L Final     Comment:     0-20 years:       Female:  mmol/L  Male:    mmol/L       20 years and older:   Female:  mmol/L   Male:    mmol/L     03/19/2021 100.6 98.0 - 110.0 mmol/L Final     Carbon Dioxide   Date Value Ref Range Status   03/19/2021 27.8 20.0 - 32.0 mmol/L Final     Carbon Dioxide (CO2)   Date Value Ref Range Status   10/03/2022 27 20 - 32 mmol/L Final     Anion Gap   Date Value Ref Range Status   10/03/2022 4 3 - 14 mmol/L Final     Glucose   Date Value Ref Range Status   10/03/2022 93 70 - 99 mg/dL Final   03/19/2021 93.7 70.0 - 99.0 mg'dL Final     Urea Nitrogen   Date Value Ref Range Status    10/03/2022 7 7 - 30 mg/dL Final     Comment:     Female  0 to 15 days           3-23  15 days to 1 year      3-17  1 to 10 years          9-22  10 to 19 years         7-19  19 years and older     7-30    Male  0 to 15 days           3-23  15 days to 1 year      3-17  1 to 10 years          9-22  10 to 19 years         7-21  19 years and older     7-30   03/19/2021 5.2 (L) 7.0 - 19.0 mg/dL Final     Creatinine   Date Value Ref Range Status   10/03/2022 0.62 0.52 - 1.25 mg/dL Final     Comment:     20 y and older Female0.52-1.04 mg/dL  20 y and older Male0.66-1.25 mg/dL    Varies with the amount of muscle mass present.    GICH  Female: 0.6-1.2 mg/dL  Male: 0.7-1.3 mg/dL     03/19/2021 0.8 0.5 - 1.0 mg/dL Final     GFR Estimate   Date Value Ref Range Status   10/03/2022 >90 >60 mL/min/1.73m2 Final     Comment:     GFR not calculated when sex unspecified or nonbinary.  Effective December 21, 2021 eGFRcr in adults is calculated using the 2021 CKD-EPI creatinine equation which includes age and gender (Juanis et al., NEJ, DOI: 10.1056/OPQXmh2084507)   03/19/2021 >90 >60.0 mL/min/1.7 m2 Final     Calcium   Date Value Ref Range Status   10/03/2022 9.3 8.5 - 10.1 mg/dL Final   03/19/2021 9.0 8.5 - 10.1 mg/dL Final     Albumin   Date Value Ref Range Status   10/03/2022 3.9 3.4 - 5.0 g/dL Final      Lab Results   Component Value Date    WBC 8.2 10/03/2022     Lab Results   Component Value Date    RBC 4.65 10/03/2022     Lab Results   Component Value Date    HGB 13.7 10/03/2022    HGB 14.3 03/19/2021     Lab Results   Component Value Date    HCT 41.5 10/03/2022    HCT 42.5 07/28/2020     No components found for: MCT  Lab Results   Component Value Date    MCV 89 10/03/2022    MCV 93.2 07/28/2020     Lab Results   Component Value Date    MCH 29.5 10/03/2022    MCH 28.3 07/28/2020     Lab Results   Component Value Date    MCHC 33.0 10/03/2022    MCHC 30.4 07/28/2020     Lab Results   Component Value Date    RDW 13.0 10/03/2022      Lab Results   Component Value Date     10/03/2022      No results found for: A1C   TSH   Date Value Ref Range Status   11/12/2021 4.15 (H) 0.40 - 4.00 mU/L Final      No results found for: VITDT   No results for input(s): MAG in the last 16455 hours.  Last Comprehensive Metabolic Panel:  Sodium   Date Value Ref Range Status   10/03/2022 140 133 - 144 mmol/L Final   03/19/2021 137.4 132.6 - 141.4 mmol/L Final     Potassium   Date Value Ref Range Status   10/03/2022 3.6 3.4 - 5.3 mmol/L Final   03/19/2021 3.9 3.3 - 4.5 mmol/L Final     Chloride   Date Value Ref Range Status   10/03/2022 109 94 - 109 mmol/L Final     Comment:     0-20 years:       Female:  mmol/L  Male:    mmol/L       20 years and older:   Female:  mmol/L   Male:    mmol/L     03/19/2021 100.6 98.0 - 110.0 mmol/L Final     Carbon Dioxide   Date Value Ref Range Status   03/19/2021 27.8 20.0 - 32.0 mmol/L Final     Carbon Dioxide (CO2)   Date Value Ref Range Status   10/03/2022 27 20 - 32 mmol/L Final     Anion Gap   Date Value Ref Range Status   10/03/2022 4 3 - 14 mmol/L Final     Glucose   Date Value Ref Range Status   10/03/2022 93 70 - 99 mg/dL Final   03/19/2021 93.7 70.0 - 99.0 mg'dL Final     Urea Nitrogen   Date Value Ref Range Status   10/03/2022 7 7 - 30 mg/dL Final     Comment:     Female  0 to 15 days           3-23  15 days to 1 year      3-17  1 to 10 years          9-22  10 to 19 years         7-19  19 years and older     7-30    Male  0 to 15 days           3-23  15 days to 1 year      3-17  1 to 10 years          9-22  10 to 19 years         7-21  19 years and older     7-30   03/19/2021 5.2 (L) 7.0 - 19.0 mg/dL Final     Creatinine   Date Value Ref Range Status   10/03/2022 0.62 0.52 - 1.25 mg/dL Final     Comment:     20 y and older Female0.52-1.04 mg/dL  20 y and older Male0.66-1.25 mg/dL    Varies with the amount of muscle mass present.    GICH  Female: 0.6-1.2 mg/dL  Male: 0.7-1.3 mg/dL      03/19/2021 0.8 0.5 - 1.0 mg/dL Final     GFR Estimate   Date Value Ref Range Status   10/03/2022 >90 >60 mL/min/1.73m2 Final     Comment:     GFR not calculated when sex unspecified or nonbinary.  Effective December 21, 2021 eGFRcr in adults is calculated using the 2021 CKD-EPI creatinine equation which includes age and gender (Juanis et al., NE, DOI: 10.[facility]6/IJYMle6145513)   03/19/2021 >90 >60.0 mL/min/1.7 m2 Final     Calcium   Date Value Ref Range Status   10/03/2022 9.3 8.5 - 10.1 mg/dL Final   03/19/2021 9.0 8.5 - 10.1 mg/dL Final     Bilirubin Total   Date Value Ref Range Status   10/03/2022 0.4 0.2 - 1.3 mg/dL Final   03/19/2021 <0.4 0.2 - 1.3 mg/dL Final     Alkaline Phosphatase   Date Value Ref Range Status   10/03/2022 85 40 - 150 U/L Final     Comment:     Female:   0-3 years  110-320 U/L  4-9 years  150-420 U/L  10-11 years  130-560 U/L  12-13 years  105-420 U/L  14-15 years   U/L  16 years and older   U/L      Male:  0-3 years  110-320 U/L  4-9 years  150-420 U/L  10-15 years  130-530 U/L  16-19 years   U/L  20 years and older   U/L     03/19/2021 74.4 31.7 - 110.5 U/L Final     ALT   Date Value Ref Range Status   10/03/2022 21 0 - 70 U/L Final     Comment:     Female   All ages0-50 U/L    Male   0 - 19 years      0-50 U/L  20 years and older0-70 U/L          03/19/2021 18.5 0.0 - 45.0 U/L Final     AST   Date Value Ref Range Status   10/03/2022 9 0 - 45 U/L Final   03/19/2021 23.4 0.0 - 45.0 U/L Final     Most Recent D-dimer:  Recent Labs   Lab Test 10/03/22  1831   DD 0.32       Admission on 10/03/2022, Discharged on 10/03/2022   Component Date Value Ref Range Status     Sodium 10/03/2022 140  133 - 144 mmol/L Final     Potassium 10/03/2022 3.6  3.4 - 5.3 mmol/L Final     Chloride 10/03/2022 109  94 - 109 mmol/L Final    0-20 years:       Female:  mmol/L  Male:    mmol/L       20 years and older:   Female:  mmol/L   Male:    mmol/L       Carbon  Dioxide (CO2) 10/03/2022 27  20 - 32 mmol/L Final     Anion Gap 10/03/2022 4  3 - 14 mmol/L Final     Urea Nitrogen 10/03/2022 7  7 - 30 mg/dL Final    Female  0 to 15 days           3-23  15 days to 1 year      3-17  1 to 10 years          9-22  10 to 19 years         7-19  19 years and older     7-30    Male  0 to 15 days           3-23  15 days to 1 year      3-17  1 to 10 years          9-22  10 to 19 years         7-21  19 years and older     7-30     Creatinine 10/03/2022 0.62  0.52 - 1.25 mg/dL Final    20 y and older Female 0.52-1.04 mg/dL  20 y and older Male 0.66-1.25 mg/dL    Varies with the amount of muscle mass present.    GICH  Female: 0.6-1.2 mg/dL  Male: 0.7-1.3 mg/dL       Calcium 10/03/2022 9.3  8.5 - 10.1 mg/dL Final     Glucose 10/03/2022 93  70 - 99 mg/dL Final     Alkaline Phosphatase 10/03/2022 85  40 - 150 U/L Final    Female:    0-3 years  110-320 U/L  4-9 years  150-420 U/L  10-11 years  130-560 U/L  12-13 years  105-420 U/L  14-15 years   U/L  16 years and older   U/L      Male:  0-3 years  110-320 U/L  4-9 years  150-420 U/L  10-15 years  130-530 U/L  16-19 years   U/L  20 years and older   U/L       AST 10/03/2022 9  0 - 45 U/L Final     ALT 10/03/2022 21  0 - 70 U/L Final    Female    All ages 0-50 U/L    Male    0 - 19 years       0-50 U/L  20 years and older 0-70 U/L            Protein Total 10/03/2022 8.4  6.8 - 8.8 g/dL Final     Albumin 10/03/2022 3.9  3.4 - 5.0 g/dL Final     Bilirubin Total 10/03/2022 0.4  0.2 - 1.3 mg/dL Final     GFR Estimate 10/03/2022 >90  >60 mL/min/1.73m2 Final    GFR not calculated when sex unspecified or nonbinary.  Effective December 21, 2021 eGFRcr in adults is calculated using the 2021 CKD-EPI creatinine equation which includes age and gender (Juanis et al., NEJM, DOI: 10.1056/ECPBvf3340026)     Troponin I High Sensitivity 10/03/2022 <3  <54 ng/L Final    Sex Specific Reference Ranges:   Female  <54  ng/L  Male      <79   ng/L  This Troponin-I result was obtained using a Siemens Dimension Vista High Sensitivity Troponin-I assay (TNIH). Effective 11/23/21, nine labs/sites in the [facility] switched from a Siemens Cope Contemporary Troponin I assay (CTNI) to a Siemens Cope High-Sensitivity Troponin I assay (TNIH).     D-Dimer Quantitative 10/03/2022 0.32  0.00 - 0.50 ug/mL FEU Final     Ventricular Rate 10/03/2022 78  BPM Final     Atrial Rate 10/03/2022 78  BPM Final     OR Interval 10/03/2022 122  ms Final     QRS Duration 10/03/2022 92  ms Final     QT 10/03/2022 368  ms Final     QTc 10/03/2022 419  ms Final     P Axis 10/03/2022 49  degrees Final     R AXIS 10/03/2022 7  degrees Final     T Axis 10/03/2022 43  degrees Final     Interpretation ECG 10/03/2022    Final                    Value:Sinus rhythm  Possible Left atrial enlargement  Borderline ECG  Unconfirmed report - interpretation of this ECG is computer generated - see medical record for final interpretation  Confirmed by - EMERGENCY ROOM, PHYSICIAN ([id number]),  MITCHELL DEVLIN ([id number]) on 10/4/2022 8:57:57 AM       N terminal Pro BNP Inpatient 10/03/2022 42  0 - 450 pg/mL Final    Reference range shown and results flagged as abnormal are suggested inpatient cut points for confirming diagnosis if CHF in an acute setting. Establishing a baseline value for each individual patient is useful for follow-up. An inpatient or emergency department NT-proPBNP <300 pg/mL effectively rules out acute CHF, with 99% negative predictive value.    The outpatient non-acute reference range for ruling out CHF is:  0-125 pg/mL (age 18 to less than 75)  0-450 pg/mL (age 75 yrs and older)      WBC Count 10/03/2022 8.2  4.0 - 11.0 10e3/uL Final     RBC Count 10/03/2022 4.65  3.80 - 5.90 10e6/uL Final    Reference Range:                                                     Female 3.80-5.20 10e6/uL                                      Male 4.40-5.90 10e6u/L     Hemoglobin 10/03/2022 13.7   11.7 - 17.7 g/dL Final    Reference Range:                                                     Female 11.7-15.7 g/dL                                      Male 13.3-17.7 g/dL     Hematocrit 10/03/2022 41.5  35.0 - 53.0 % Final    Reference Range:                                                     Female 35.0-47.0 %                                            Male 40.0-54.0 %     MCV 10/03/2022 89  78 - 100 fL Final     MCH 10/03/2022 29.5  26.5 - 33.0 pg Final     MCHC 10/03/2022 33.0  31.5 - 36.5 g/dL Final     RDW 10/03/2022 13.0  10.0 - 15.0 % Final     Platelet Count 10/03/2022 290  150 - 450 10e3/uL Final     % Neutrophils 10/03/2022 62  % Final     % Lymphocytes 10/03/2022 31  % Final     % Monocytes 10/03/2022 6  % Final     % Eosinophils 10/03/2022 1  % Final     % Basophils 10/03/2022 0  % Final     % Immature Granulocytes 10/03/2022 0  % Final     NRBCs per 100 WBC 10/03/2022 0  <1 /100 Final     Absolute Neutrophils 10/03/2022 5.1  1.6 - 8.3 10e3/uL Final     Absolute Lymphocytes 10/03/2022 2.5  0.8 - 5.3 10e3/uL Final     Absolute Monocytes 10/03/2022 0.5  0.0 - 1.3 10e3/uL Final     Absolute Eosinophils 10/03/2022 0.1  0.0 - 0.7 10e3/uL Final     Absolute Basophils 10/03/2022 0.0  0.0 - 0.2 10e3/uL Final     Absolute Immature Granulocytes 10/03/2022 0.0  <=0.4 10e3/uL Final     Absolute NRBCs 10/03/2022 0.0  10e3/uL Final       Telephone- visit details:  Phone call duration: 11 minutes

## 2022-12-05 NOTE — LETTER
12/5/2022       RE: Elva Aaron  [address]  [place] [address]     Dear Colleague,    Thank you for referring your patient, Elva Aaron, to the [facility] PHYSICAL MEDICINE [facility] REHABIL[facility]ATION [facility]IN[facility] [place] at [facility]. Please see a copy of my visit note below.      Assessment/Impression   1. Post-nasal drainage  Patient still with post nasal drainage and a history of allergies. Discussed this is likely more the factor behind his cough as it is intermittent and mostly when heating turns on.  Discussed Flonase and side effects.  Encouraged to try for 14 days and if not improved to reach out to PCP.   - fluticasone (FLONASE) 50 MCG/ACT nasal spray; Spray 1 spray into both nostrils daily  Dispense: 9.9 mL; Refill: 0    2. Post-COVID syndrome resolved  Patient with recovered dyspnea, fatigue and improving cough.  Advised to reach out if he symptoms worsened again.       Plan:  1. I reviewed present knowledge on long-Covid.  Education was provided and question were answered.  2. Orders/Referrals as above  3. I will advised patient on test results  4. I will follow up with Elva Aaron PRN. I will review progress and consider need for any other therapeutic interventions. If there are any questions and/or concerns he will call the clinic.      On day of encounter time spent in chart review and with patient in consultation, exam, education,coordination of care,  review of outside charts/documentation and documentation:  30 minutes     I have attempted to proof read for major spelling errors and apologize for any minor errors I may have missed.      _________________________________  Myla Crook PA-C  [facility] PHYSICAL MEDICINE AND REHABILITATION CLINIC [place]    Subjective   Previously: 10/21/22   This 27 year old adult presents to the [facility] Post-COVID clinic as a new consult to  "evaluate continuing symptoms after COVID infection initially diagnosed 9/15/22.  Elva \"Zed\" Agnieszka presented to ED on 9/19/22 complaining of sore throat, rhinorrhea, shortness of breath, cough, chest pain, fever, chills, fatigue and weakness. Treatment was symptomatic, albuterol.  Elva \"Zed\" Agnieszka experienced complications of shortness of breath.  Continuing symptoms include fatigue, generalized aches, cough, shortness of breath and headache.  Patient has shortness of breath with exertion.  He will feel winded walking up stairs.  He does feel this is improving.   He has intermittent chest pain as well. He feels the pain mostly on the left side and  He has been taking Advil for his pain.   Patient is still coughing as well with exertion. Patient will feel tired during the day. He will either wake up feeling tired or feel tired after 1-2 activities.  Patient is having has shoulder, neck and arms.  Patient will get muscle fatigue very quickly.   Past medical history is significant for Migraines, depression and anxiety. The patient was vaccinated against COVID X3.  Previous activity was unemployed    Today:   Patient returns for a follow up. Patient still has a lingering cough that cough medicine is helping him. Taking medicine 2-3x since last visit.  He noticed when his heat kicks on will have a cough.   Dry and slightly wheezy per patient.  He is still having post nasal drainage.  Patient wish to try Flonase as he is having lingering nasal drainage.  He does feel his fatigue has improved as well.     Current Symptoms:  Cough: improving  Fatigue (functional assessment based on ADLS): improved    Goals of Care: continue to improve cough    Current concerns: No    PHQ Assesment Total Score(s) 12/5/2022   PHQ-9 Score 1   Some recent data might be hidden     ARIK-7 Results 12/5/2022   ARIK 7 TOTAL SCORE 2 (minimal anxiety)   ARIK-7 Total Score 2   Some recent data might be hidden     PTSD Screen Score 12/5/2022 "   Have you ever experienced this kind of event? No   Some recent data might be hidden      PROMIS 29                                                12/5/22                                                10/21/22  Scoring Physical Function (higher score better) (range: 4 - 20) 20 (   Sum of 4 Physical Function Questions) 14 (   Sum of 4 Physical Function Questions)   Scoring Anxiety (lower score better) (range: 4 - 20) 4 (   Sum of 4 Anxiety Questions) 4 (   Sum of 4 Anxiety Questions)   Scoring Depression (lower score better) (range: 4 - 20) 4 (   Sum of 4 Depression Questions) 4 (   Sum of 4 Depression Questions)   Scoring Fatigue (lower score better) (range: 4 - 20) 10 (   Sum of 4 Fatigue Questons ) 16 (   Sum of 4 Fatigue Questons )   Scoring Sleep Disturbance (lower score better) (range: 4 - 20) 9 (   Sum of 4 Sleep Disturbance Questions) 14 (   Sum of 4 Sleep Disturbance Questions)   Scoring Satisfaction with Social Role (higher score better) (range: 4 - 20) 12 (   Sum of 4 Satisfaction with Social Role Questions) 8 (   Sum of 4 Satisfaction with Social Role Questions)   Scoring Pain Interference (lower score better) (range: 4 - 20) 5 (   Sum of 4 Pain Interference Questions) 15 (   Sum of 4 Pain Interference Questions)     No past medical history on file.    Past Surgical History:   Procedure Laterality Date     ADENOIDECTOMY       COSMETIC PLACEMENT OF DENTAL IMPLANT(S)       HC TOOTH EXTRACTION W/FORCEP       MASTECTOMY SIMPLE BILATERAL Bilateral 12/17/2021    Procedure: MASTECTOMY, BILATERAL, SIMPLE, WITH nipple grafts. OnQ;  Surgeon: Lashanda Mulligan MD;  Location: [facility] OR     TONSILLECTOMY         Family History   Problem Relation Age of Onset     Clotting Disorder Maternal Grandmother      Cancer Maternal Grandfather         Colon Ca     Thyroid Disease Mother        Social History     Tobacco Use     Smoking status: Never     Smokeless tobacco: Never   Substance Use Topics     Alcohol use: Yes     " Comment: occ.     Drug use: Never         Current Outpatient Medications:      acetaminophen (TYLENOL) 325 MG tablet, Take 325-650 mg by mouth every 6 hours as needed for mild pain, Disp: , Rfl:      albuterol (PROAIR HFA) 108 (90 Base) MCG/ACT inhaler, Inhale 2 puffs into the lungs every 4 hours as needed for shortness of breath / dyspnea, Disp: 8 g, Rfl: 0     amphetamine-dextroamphetamine (ADDERALL XR) 10 MG 24 hr capsule, Take 1 capsule (10 mg) by mouth daily (Patient not taking: Reported on 10/4/2022), Disp: 30 capsule, Rfl: 0     benzonatate (TESSALON) 100 MG capsule, Take 100 mg by mouth (Patient not taking: Reported on 10/6/2022), Disp: , Rfl:      hydrOXYzine (ATARAX) 25 MG tablet, Take 1 tablet (25 mg) by mouth 3 times daily as needed for itching (Patient not taking: Reported on 10/6/2022), Disp: 20 tablet, Rfl: 0     ibuprofen (ADVIL/MOTRIN) 200 MG capsule, Take 200 mg by mouth every 4 hours as needed for fever , Disp: , Rfl:      lidocaine-prilocaine (EMLA) 2.5-2.5 % external cream, Apply topically as needed for moderate pain Apply to symptomatic hypertrophic scars prior to steroid injections. Apply within 30-45 minutes prior to appointment. Cover with Tegaderm or Saran Wrap. (Patient not taking: Reported on 10/6/2022), Disp: 30 g, Rfl: 1     Needle, Disp, (BD DISP NEEDLE) 23G X 1\" MISC, 1 Units once a week (Patient not taking: Reported on 10/6/2022), Disp: 50 each, Rfl: 1     Needle, Disp, (BD SAFETYGLIDE NEEDLE) 27G X 5/8\" MISC, USE ONE NEEDLE ONCE WEEKLY (Patient not taking: Reported on 10/6/2022), Disp: 50 each, Rfl: 1     ondansetron (ZOFRAN-ODT) 4 MG ODT tab, Take 1 tablet (4 mg) by mouth every 8 hours as needed for nausea (Patient not taking: Reported on 10/6/2022), Disp: 20 tablet, Rfl: 0     SUMAtriptan (IMITREX) 25 MG tablet, Take 25 mg by mouth at onset of headache for migraine, Disp: , Rfl:      syringe, disposable, 1 ML MISC, 1 Units once a week (Patient not taking: Reported on 10/6/2022), " Disp: 60 each, Rfl: 1     testosterone cypionate (DEPOTESTOSTERONE) 200 MG/ML injection, Inject 0.4 mLs (80 mg) into the muscle once a week Inject 0.4mLs (80mg) into the muscle once a week. (Patient not taking: Reported on 10/6/2022), Disp: 10 mL, Rfl: 5    Answers to ROS/HPI submitted by patient on 12/5/22  Review of Systems   HENT: Positive for sinus pain. Negative for ear discharge, ear pain, hearing loss, mouth sores, nosebleeds, sore throat, tinnitus and trouble swallowing.    Respiratory: Positive for cough and wheezing. Negative for shortness of breath.           Objective         Vitals:  No vitals were obtained today due to virtual visit.    Physical Exam   GENERAL: Healthy, alert and no distress  RESP: No audible wheeze, or cough, able to speak in full sentences  PSYCH: Mentation appears normal, affect normal/bright, judgement and insight intact, normal speech  .  Remainder of exam unable to be completed due to telephone visit        Last Renal Panel:  Sodium   Date Value Ref Range Status   10/03/2022 140 133 - 144 mmol/L Final   03/19/2021 137.4 132.6 - 141.4 mmol/L Final     Potassium   Date Value Ref Range Status   10/03/2022 3.6 3.4 - 5.3 mmol/L Final   03/19/2021 3.9 3.3 - 4.5 mmol/L Final     Chloride   Date Value Ref Range Status   10/03/2022 109 94 - 109 mmol/L Final     Comment:     0-20 years:       Female:  mmol/L  Male:    mmol/L       20 years and older:   Female:  mmol/L   Male:    mmol/L     03/19/2021 100.6 98.0 - 110.0 mmol/L Final     Carbon Dioxide   Date Value Ref Range Status   03/19/2021 27.8 20.0 - 32.0 mmol/L Final     Carbon Dioxide (CO2)   Date Value Ref Range Status   10/03/2022 27 20 - 32 mmol/L Final     Anion Gap   Date Value Ref Range Status   10/03/2022 4 3 - 14 mmol/L Final     Glucose   Date Value Ref Range Status   10/03/2022 93 70 - 99 mg/dL Final   03/19/2021 93.7 70.0 - 99.0 mg'dL Final     Urea Nitrogen   Date Value Ref Range Status    10/03/2022 7 7 - 30 mg/dL Final     Comment:     Female  0 to 15 days           3-23  15 days to 1 year      3-17  1 to 10 years          9-22  10 to 19 years         7-19  19 years and older     7-30    Male  0 to 15 days           3-23  15 days to 1 year      3-17  1 to 10 years          9-22  10 to 19 years         7-21  19 years and older     7-30   03/19/2021 5.2 (L) 7.0 - 19.0 mg/dL Final     Creatinine   Date Value Ref Range Status   10/03/2022 0.62 0.52 - 1.25 mg/dL Final     Comment:     20 y and older Female0.52-1.04 mg/dL  20 y and older Male0.66-1.25 mg/dL    Varies with the amount of muscle mass present.    GICH  Female: 0.6-1.2 mg/dL  Male: 0.7-1.3 mg/dL     03/19/2021 0.8 0.5 - 1.0 mg/dL Final     GFR Estimate   Date Value Ref Range Status   10/03/2022 >90 >60 mL/min/1.73m2 Final     Comment:     GFR not calculated when sex unspecified or nonbinary.  Effective December 21, 2021 eGFRcr in adults is calculated using the 2021 CKD-EPI creatinine equation which includes age and gender (Juanis et al., NEJ, DOI: 10.1056/PUZXjk5054114)   03/19/2021 >90 >60.0 mL/min/1.7 m2 Final     Calcium   Date Value Ref Range Status   10/03/2022 9.3 8.5 - 10.1 mg/dL Final   03/19/2021 9.0 8.5 - 10.1 mg/dL Final     Albumin   Date Value Ref Range Status   10/03/2022 3.9 3.4 - 5.0 g/dL Final      Lab Results   Component Value Date    WBC 8.2 10/03/2022     Lab Results   Component Value Date    RBC 4.65 10/03/2022     Lab Results   Component Value Date    HGB 13.7 10/03/2022    HGB 14.3 03/19/2021     Lab Results   Component Value Date    HCT 41.5 10/03/2022    HCT 42.5 07/28/2020     No components found for: MCT  Lab Results   Component Value Date    MCV 89 10/03/2022    MCV 93.2 07/28/2020     Lab Results   Component Value Date    MCH 29.5 10/03/2022    MCH 28.3 07/28/2020     Lab Results   Component Value Date    MCHC 33.0 10/03/2022    MCHC 30.4 07/28/2020     Lab Results   Component Value Date    RDW 13.0 10/03/2022      Lab Results   Component Value Date     10/03/2022      No results found for: A1C   TSH   Date Value Ref Range Status   11/12/2021 4.15 (H) 0.40 - 4.00 mU/L Final      No results found for: VITDT   No results for input(s): MAG in the last 96931 hours.  Last Comprehensive Metabolic Panel:  Sodium   Date Value Ref Range Status   10/03/2022 140 133 - 144 mmol/L Final   03/19/2021 137.4 132.6 - 141.4 mmol/L Final     Potassium   Date Value Ref Range Status   10/03/2022 3.6 3.4 - 5.3 mmol/L Final   03/19/2021 3.9 3.3 - 4.5 mmol/L Final     Chloride   Date Value Ref Range Status   10/03/2022 109 94 - 109 mmol/L Final     Comment:     0-20 years:       Female:  mmol/L  Male:    mmol/L       20 years and older:   Female:  mmol/L   Male:    mmol/L     03/19/2021 100.6 98.0 - 110.0 mmol/L Final     Carbon Dioxide   Date Value Ref Range Status   03/19/2021 27.8 20.0 - 32.0 mmol/L Final     Carbon Dioxide (CO2)   Date Value Ref Range Status   10/03/2022 27 20 - 32 mmol/L Final     Anion Gap   Date Value Ref Range Status   10/03/2022 4 3 - 14 mmol/L Final     Glucose   Date Value Ref Range Status   10/03/2022 93 70 - 99 mg/dL Final   03/19/2021 93.7 70.0 - 99.0 mg'dL Final     Urea Nitrogen   Date Value Ref Range Status   10/03/2022 7 7 - 30 mg/dL Final     Comment:     Female  0 to 15 days           3-23  15 days to 1 year      3-17  1 to 10 years          9-22  10 to 19 years         7-19  19 years and older     7-30    Male  0 to 15 days           3-23  15 days to 1 year      3-17  1 to 10 years          9-22  10 to 19 years         7-21  19 years and older     7-30   03/19/2021 5.2 (L) 7.0 - 19.0 mg/dL Final     Creatinine   Date Value Ref Range Status   10/03/2022 0.62 0.52 - 1.25 mg/dL Final     Comment:     20 y and older Female0.52-1.04 mg/dL  20 y and older Male0.66-1.25 mg/dL    Varies with the amount of muscle mass present.    GICH  Female: 0.6-1.2 mg/dL  Male: 0.7-1.3 mg/dL      03/19/2021 0.8 0.5 - 1.0 mg/dL Final     GFR Estimate   Date Value Ref Range Status   10/03/2022 >90 >60 mL/min/1.73m2 Final     Comment:     GFR not calculated when sex unspecified or nonbinary.  Effective December 21, 2021 eGFRcr in adults is calculated using the 2021 CKD-EPI creatinine equation which includes age and gender (Juanis et al., NE, DOI: 10.[facility]6/XSDRhn3700003)   03/19/2021 >90 >60.0 mL/min/1.7 m2 Final     Calcium   Date Value Ref Range Status   10/03/2022 9.3 8.5 - 10.1 mg/dL Final   03/19/2021 9.0 8.5 - 10.1 mg/dL Final     Bilirubin Total   Date Value Ref Range Status   10/03/2022 0.4 0.2 - 1.3 mg/dL Final   03/19/2021 <0.4 0.2 - 1.3 mg/dL Final     Alkaline Phosphatase   Date Value Ref Range Status   10/03/2022 85 40 - 150 U/L Final     Comment:     Female:   0-3 years  110-320 U/L  4-9 years  150-420 U/L  10-11 years  130-560 U/L  12-13 years  105-420 U/L  14-15 years   U/L  16 years and older   U/L      Male:  0-3 years  110-320 U/L  4-9 years  150-420 U/L  10-15 years  130-530 U/L  16-19 years   U/L  20 years and older   U/L     03/19/2021 74.4 31.7 - 110.5 U/L Final     ALT   Date Value Ref Range Status   10/03/2022 21 0 - 70 U/L Final     Comment:     Female   All ages0-50 U/L    Male   0 - 19 years      0-50 U/L  20 years and older0-70 U/L          03/19/2021 18.5 0.0 - 45.0 U/L Final     AST   Date Value Ref Range Status   10/03/2022 9 0 - 45 U/L Final   03/19/2021 23.4 0.0 - 45.0 U/L Final     Most Recent D-dimer:  Recent Labs   Lab Test 10/03/22  1831   DD 0.32       Admission on 10/03/2022, Discharged on 10/03/2022   Component Date Value Ref Range Status     Sodium 10/03/2022 140  133 - 144 mmol/L Final     Potassium 10/03/2022 3.6  3.4 - 5.3 mmol/L Final     Chloride 10/03/2022 109  94 - 109 mmol/L Final    0-20 years:       Female:  mmol/L  Male:    mmol/L       20 years and older:   Female:  mmol/L   Male:    mmol/L       Carbon  Dioxide (CO2) 10/03/2022 27  20 - 32 mmol/L Final     Anion Gap 10/03/2022 4  3 - 14 mmol/L Final     Urea Nitrogen 10/03/2022 7  7 - 30 mg/dL Final    Female  0 to 15 days           3-23  15 days to 1 year      3-17  1 to 10 years          9-22  10 to 19 years         7-19  19 years and older     7-30    Male  0 to 15 days           3-23  15 days to 1 year      3-17  1 to 10 years          9-22  10 to 19 years         7-21  19 years and older     7-30     Creatinine 10/03/2022 0.62  0.52 - 1.25 mg/dL Final    20 y and older Female 0.52-1.04 mg/dL  20 y and older Male 0.66-1.25 mg/dL    Varies with the amount of muscle mass present.    GICH  Female: 0.6-1.2 mg/dL  Male: 0.7-1.3 mg/dL       Calcium 10/03/2022 9.3  8.5 - 10.1 mg/dL Final     Glucose 10/03/2022 93  70 - 99 mg/dL Final     Alkaline Phosphatase 10/03/2022 85  40 - 150 U/L Final    Female:    0-3 years  110-320 U/L  4-9 years  150-420 U/L  10-11 years  130-560 U/L  12-13 years  105-420 U/L  14-15 years   U/L  16 years and older   U/L      Male:  0-3 years  110-320 U/L  4-9 years  150-420 U/L  10-15 years  130-530 U/L  16-19 years   U/L  20 years and older   U/L       AST 10/03/2022 9  0 - 45 U/L Final     ALT 10/03/2022 21  0 - 70 U/L Final    Female    All ages 0-50 U/L    Male    0 - 19 years       0-50 U/L  20 years and older 0-70 U/L            Protein Total 10/03/2022 8.4  6.8 - 8.8 g/dL Final     Albumin 10/03/2022 3.9  3.4 - 5.0 g/dL Final     Bilirubin Total 10/03/2022 0.4  0.2 - 1.3 mg/dL Final     GFR Estimate 10/03/2022 >90  >60 mL/min/1.73m2 Final    GFR not calculated when sex unspecified or nonbinary.  Effective December 21, 2021 eGFRcr in adults is calculated using the 2021 CKD-EPI creatinine equation which includes age and gender (Juanis et al., NEJM, DOI: 10.1056/RZQTrs2137985)     Troponin I High Sensitivity 10/03/2022 <3  <54 ng/L Final    Sex Specific Reference Ranges:   Female  <54  ng/L  Male      <79   ng/L  This Troponin-I result was obtained using a Siemens Dimension Vista High Sensitivity Troponin-I assay (TNIH). Effective 11/23/21, nine labs/sites in the [facility] switched from a Siemens [place] Contemporary Troponin I assay (CTNI) to a Siemens [place] High-Sensitivity Troponin I assay (TNIH).     D-Dimer Quantitative 10/03/2022 0.32  0.00 - 0.50 ug/mL FEU Final     Ventricular Rate 10/03/2022 78  BPM Final     Atrial Rate 10/03/2022 78  BPM Final     IN Interval 10/03/2022 122  ms Final     QRS Duration 10/03/2022 92  ms Final     QT 10/03/2022 368  ms Final     QTc 10/03/2022 419  ms Final     P Axis 10/03/2022 49  degrees Final     R AXIS 10/03/2022 7  degrees Final     T Axis 10/03/2022 43  degrees Final     Interpretation ECG 10/03/2022    Final                    Value:Sinus rhythm  Possible Left atrial enlargement  Borderline ECG  Unconfirmed report - interpretation of this ECG is computer generated - see medical record for final interpretation  Confirmed by - EMERGENCY ROOM, PHYSICIAN ([id number]),  MITCHELL DEVLIN ([id number]) on 10/4/2022 8:57:57 AM       N terminal Pro BNP Inpatient 10/03/2022 42  0 - 450 pg/mL Final    Reference range shown and results flagged as abnormal are suggested inpatient cut points for confirming diagnosis if CHF in an acute setting. Establishing a baseline value for each individual patient is useful for follow-up. An inpatient or emergency department NT-proPBNP <300 pg/mL effectively rules out acute CHF, with 99% negative predictive value.    The outpatient non-acute reference range for ruling out CHF is:  0-125 pg/mL (age 18 to less than 75)  0-450 pg/mL (age 75 yrs and older)      WBC Count 10/03/2022 8.2  4.0 - 11.0 10e3/uL Final     RBC Count 10/03/2022 4.65  3.80 - 5.90 10e6/uL Final    Reference Range:                                                     Female 3.80-5.20 10e6/uL                                      Male 4.40-5.90 10e6u/L     Hemoglobin 10/03/2022 13.7   11.7 - 17.7 g/dL Final    Reference Range:                                                     Female 11.7-15.7 g/dL                                      Male 13.3-17.7 g/dL     Hematocrit 10/03/2022 41.5  35.0 - 53.0 % Final    Reference Range:                                                     Female 35.0-47.0 %                                            Male 40.0-54.0 %     MCV 10/03/2022 89  78 - 100 fL Final     MCH 10/03/2022 29.5  26.5 - 33.0 pg Final     MCHC 10/03/2022 33.0  31.5 - 36.5 g/dL Final     RDW 10/03/2022 13.0  10.0 - 15.0 % Final     Platelet Count 10/03/2022 290  150 - 450 10e3/uL Final     % Neutrophils 10/03/2022 62  % Final     % Lymphocytes 10/03/2022 31  % Final     % Monocytes 10/03/2022 6  % Final     % Eosinophils 10/03/2022 1  % Final     % Basophils 10/03/2022 0  % Final     % Immature Granulocytes 10/03/2022 0  % Final     NRBCs per 100 WBC 10/03/2022 0  <1 /100 Final     Absolute Neutrophils 10/03/2022 5.1  1.6 - 8.3 10e3/uL Final     Absolute Lymphocytes 10/03/2022 2.5  0.8 - 5.3 10e3/uL Final     Absolute Monocytes 10/03/2022 0.5  0.0 - 1.3 10e3/uL Final     Absolute Eosinophils 10/03/2022 0.1  0.0 - 0.7 10e3/uL Final     Absolute Basophils 10/03/2022 0.0  0.0 - 0.2 10e3/uL Final     Absolute Immature Granulocytes 10/03/2022 0.0  <=0.4 10e3/uL Final     Absolute NRBCs 10/03/2022 0.0  10e3/uL Final       Telephone- visit details:  Phone call duration: 11 minutes        Sincerely,    Myla Crook PA-C

## 2023-05-08 ENCOUNTER — HEALTH MAINTENANCE LETTER (OUTPATIENT)
Age: 28
End: 2023-05-08

## 2023-12-07 ENCOUNTER — OFFICE VISIT (OUTPATIENT)
Dept: URGENT CARE | Facility: URGENT CARE | Age: 28
End: 2023-12-07
Payer: COMMERCIAL

## 2023-12-07 DIAGNOSIS — Z53.9 DIAGNOSIS NOT YET DEFINED: Primary | ICD-10-CM

## 2023-12-13 NOTE — COMMUNITY RESOURCES LIST (ENGLISH)
12/13/2023   [facility]  N/A  For questions about this resource list or additional care needs, please contact your primary care clinic or care manager.  Phone: [phone]   Email: N/A   Address: [address] [place] [address]   Hours: N/A        Food and Nutrition       Food pantry  1  [employer] - Emergency Food Shelf Distance: 0.33 miles      Pickup   [address] [place] [address]  Language: English, Namibian  Hours: Mon 9:30 AM - 11:30 AM Appt. Only  Fees: Free   Phone: (431) 595-7027 Email: ally@[employer] Website: http://www.[employer].org/     2  [facility]  [facility] Distance: 0.78 miles      Pickup   [address] [place] [address]  Language: English  Hours: Mon - Fri 12:00 PM - 1:00 PM  Fees: Free   Phone: (416) 995-8273 Email: info@[employer].org Website: https://www.[facility].org/locations/_[place]_ymca     SNAP application assistance  3  Community Action Partnership ([facility] Distance: 0.3 miles      Phone/Virtual   [address] [address]  Language: English  Hours: Mon - Fri 8:00 AM - 4:30 PM  Fees: Free   Phone: (839) 802-9729 Email: info@caprw.org Website: http://www.caprw.org/     4  [facility] Extension - SNAP Ed - SNAP Ed - SNAP application assistance Distance: 2.26 miles      In-Person   [address] [place] [address]  Language: English, Hmong, Oromo, [place]ian, Namibian  Hours: Mon - Fri 9:00 AM - 5:00 PM Appt. Only  Fees: Free   Phone: (676) 393-5962 Email: kang@[facility] Website: https://extension.[facility]/nutrition-education/snap-ed-educational-offerings     Soup kitchen or free meals  5  [employer] [place] Distance: 0.47 miles      Pickup   [address] [place] [address]  Language: English  Hours: Mon 12:00 PM - 2:00 PM , Wed 12:00 PM - 2:00 PM  Fees: Free   Phone: (278) 124-1626 Ext.4 Email: info@[employer].org  Website: http://www.openChenghai Technologyidway.org     6  [facility] [place] [facility] - Free Summer Meals Distance: 0.7 miles      In-Person   [address] [place] [address]  Language: English  Hours: Mon - Fri 12:30 PM - 1:30 PM , Mon - Fri 4:00 PM - 5:00 PM  Fees: Free   Phone: (166) 664-8779 Email: ana@.[facility]. Website: https://www.[facility]/facilities/chblboy-udbtdewxqy-baszbk          Transportation       Free or low-cost transportation  7  [facility] Distance: 1.92 miles      In-Person   [address] [place] [address]  Language: English, Tunisian  Hours: Mon 9:00 AM - 5:00 PM , Tue 9:30 AM - 7:00 PM , Wed 9:00 AM - 5:00 PM , Thu 9:30 AM - 7:00 PM , Fri 9:00 AM - 5:00 PM  Fees: Free   Phone: (463) 940-2951 Email: contactus@[employer] Website: http://www.[employer]     8  [facility] Distance: 5.1 miles      In-Person   [address] [place] [address]  Language: English  Hours: Mon - Fri 8:00 AM - 3:00 PM  Fees: Free   Phone: (849) 562-9674 Ext.14 Email: lucina@[facility].[employer]5 Website: http://www.[facility].UNI5     Transportation to medical appointments  9  [facility] - Non-Emergency Medical Transportation Distance: 2.73 miles      In-Person   [address] [place] [address]  Language: English  Hours: Mon - Fri 8:00 AM - 4:00 PM Appt. Only  Fees: Self Pay   Phone: (648) 467-7882 Website: http://www.[facility].org/Medical-Services/Emergency-medical-services/Non-emergency-transportation/     10  Discover Ride Distance: 4.58 miles      In-Person   [address] [address]  Language: English  Hours: Mon - Thu 6:00 AM - 6:00 PM , Fri 6:00 AM - 5:00 PM  Fees: Insurance, Self Pay   Phone: (754) 212-4130 Email: office@[employer] Website: https://www.[employer]/          Important Numbers & Websites       Emergency Services   911  [facility] Services   311  Poison Control   (879)  990-9619  Suicide Prevention Lifeline   (823) 591-4438 (TALK)  Child Abuse Hotline   (482) 168-3529 (4-A-Child)  Sexual Assault Hotline   (958) 993-1146 (HOPE)  National Runaway Safeline   (606) 950-6399 (RUNAWAY)  All-Options Talkline   (632) 922-8079  Substance Abuse Referral   (723) 845-4777 (HELP)

## 2023-12-19 ENCOUNTER — OFFICE VISIT (OUTPATIENT)
Dept: FAMILY MEDICINE | Facility: CLINIC | Age: 28
End: 2023-12-19
Payer: COMMERCIAL

## 2023-12-19 VITALS
OXYGEN SATURATION: 98 % | HEART RATE: 89 BPM | WEIGHT: 208 LBS | HEIGHT: 65 IN | DIASTOLIC BLOOD PRESSURE: 81 MMHG | BODY MASS INDEX: 34.66 KG/M2 | RESPIRATION RATE: 18 BRPM | TEMPERATURE: 97.9 F | SYSTOLIC BLOOD PRESSURE: 115 MMHG

## 2023-12-19 DIAGNOSIS — J01.10 ACUTE NON-RECURRENT FRONTAL SINUSITIS: Primary | ICD-10-CM

## 2023-12-19 DIAGNOSIS — R09.82 POST-NASAL DRIP: ICD-10-CM

## 2023-12-19 PROCEDURE — 99214 OFFICE O/P EST MOD 30 MIN: CPT | Performed by: NURSE PRACTITIONER

## 2023-12-19 RX ORDER — LORATADINE 10 MG/1
10 TABLET ORAL DAILY
Qty: 30 TABLET | Refills: 0 | Status: SHIPPED | OUTPATIENT
Start: 2023-12-19

## 2023-12-19 RX ORDER — DOXYCYCLINE 100 MG/1
100 CAPSULE ORAL 2 TIMES DAILY
Qty: 20 CAPSULE | Refills: 0 | Status: SHIPPED | OUTPATIENT
Start: 2023-12-19 | End: 2023-12-29

## 2023-12-19 RX ORDER — NEEDLES, DISPOSABLE 25GX5/8"
1 NEEDLE, DISPOSABLE MISCELLANEOUS WEEKLY
Qty: 50 EACH | Refills: 1 | Status: CANCELLED | OUTPATIENT
Start: 2023-12-19

## 2023-12-19 RX ORDER — NEEDLES, SAFETY 22GX1 1/2"
NEEDLE, DISPOSABLE MISCELLANEOUS
Qty: 50 EACH | Refills: 1 | Status: CANCELLED | OUTPATIENT
Start: 2023-12-19

## 2023-12-19 RX ORDER — DEXTROAMPHETAMINE SACCHARATE, AMPHETAMINE ASPARTATE MONOHYDRATE, DEXTROAMPHETAMINE SULFATE AND AMPHETAMINE SULFATE 2.5; 2.5; 2.5; 2.5 MG/1; MG/1; MG/1; MG/1
10 CAPSULE, EXTENDED RELEASE ORAL DAILY
Qty: 30 CAPSULE | Refills: 0 | Status: CANCELLED | OUTPATIENT
Start: 2023-12-19

## 2023-12-19 RX ORDER — TESTOSTERONE CYPIONATE 200 MG/ML
80 INJECTION, SOLUTION INTRAMUSCULAR WEEKLY
Qty: 10 ML | Refills: 5 | Status: CANCELLED | OUTPATIENT
Start: 2023-12-19

## 2023-12-19 ASSESSMENT — PATIENT HEALTH QUESTIONNAIRE - PHQ9
SUM OF ALL RESPONSES TO PHQ QUESTIONS 1-9: 8
10. IF YOU CHECKED OFF ANY PROBLEMS, HOW DIFFICULT HAVE THESE PROBLEMS MADE IT FOR YOU TO DO YOUR WORK, TAKE CARE OF THINGS AT HOME, OR GET ALONG WITH OTHER PEOPLE: SOMEWHAT DIFFICULT
SUM OF ALL RESPONSES TO PHQ QUESTIONS 1-9: 8

## 2023-12-19 ASSESSMENT — PAIN SCALES - GENERAL: PAINLEVEL: MODERATE PAIN (4)

## 2023-12-19 NOTE — PROGRESS NOTES
"  Assessment & Plan     Acute non-recurrent frontal sinusitis  Physical exam is consistent with tenderness over frontal and maxillary sinuses. Will treat with second round of antibiotics. If persistent symptoms despite treatment, then notify provider and could consider CT scan and referral to ENT.   - doxycycline hyclate (VIBRAMYCIN) 100 MG capsule  Dispense: 20 capsule; Refill: 0  -start sinus rinses (provided with printed instructions)    Post-nasal drip  Reports chronic issue- intermittent since childhood and possible related to cat or environmental allergies. As patient is having sinus symptoms could also be related to sinus infection.   -Continue flonase   - budesonide (RHINOCORT ALLERGY) 32 MCG/ACT nasal spray  Dispense: 8.43 mL; Refill: 1  - loratadine (CLARITIN) 10 MG tablet  Dispense: 30 tablet; Refill: 0    Patient is also due for preventive visit- which I recommended to schedule with PCP or myself.              BMI:   Estimated body mass index is 34.61 kg/m  as calculated from the following:    Height as of this encounter: 1.651 m (5' 5\").    Weight as of this encounter: 94.3 kg (208 lb).           EMILIANO [facility]    Subjective   Elder is a 28 year old, presenting for the following health issues:  Recheck Medication, Musculoskeletal Problem (Problems with what appears to be reoccurring musculoskeletal pain ), Headache, and Sinus Problem (Sinus pressure/Post nasal dip/Sense of smell has declined)        12/19/2023    10:19 AM   Additional Questions   Roomed by lewis STEPHENSON  Elder presents with ongoing post- nasal drip, sinus pressure, and headaches. Symptoms started about 4 weeks ago. Has had migraines in the past, but this type of headache is more pressure and different than migraines.   About 2 weeks ago, went to urgent care and was prescribed Augmentin- finished course of abx 5 days ago. Has also been using flonase spray.  Continues to have pain/pressure " "on forehead and maxillary sinus, also having post nasal drip.  Sometimes gets sinus pain with with chewing -   No longer having ear pain or pressure  Has not had recurrent sinus infections in the past year but has had recurrent post-nasal drip. Has had post-nasal drip intermitently since childhood   No fever, chills, cough, shortness of breath, or chest pain.    History of Present Illness       Headaches:   Since the patient's last clinic visit, headaches are: worsened  The patient is getting headaches:  Multiple days of the week, sinus discomfort more frequent  He is not able to do normal daily activities when he has a migraine.  The patient is taking the following rescue/relief medications:  Ibuprofen (Advil, Motrin), Tylenol and sumatriptan (Imitrex)   Patient states \"The relief is inconsistent\" from the rescue/relief medications.   The patient is taking the following medications to prevent migraines:  Other  In the past 4 weeks, the patient has gone to an Urgent Care or Emergency Room 1 time times due to headaches.    Reason for visit:  New symptoms, medication renewal  Symptom onset:  More than a month  Symptoms include:  Headache, facial pain & pressure, musculoskeletal fatigue & pain with no fever  Symptom intensity:  Moderate  Symptom progression:  Worsening  Had these symptoms before:  Yes  Has tried/received treatment for these symptoms:  Yes  Previous treatment was successful:  No  What makes it worse:  Strenuous activity, sitting in certain positions, standing for too long  What makes it better:  Laying down    He eats 0-1 servings of fruits and vegetables daily.He consumes 1 sweetened beverage(s) daily.He exercises with enough effort to increase his heart rate 9 or less minutes per day.  He exercises with enough effort to increase his heart rate 3 or less days per week.   He is taking medications regularly.                 Review of Systems   See HPI      Objective    /81 (BP Location: Left arm, " "Patient Position: Sitting, Cuff Size: Adult Large)   Pulse 89   Temp 97.9  F (36.6  C)   Resp 18   Ht 1.651 m (5' 5\")   Wt 94.3 kg (208 lb)   LMP  (LMP Unknown)   SpO2 98%   BMI 34.61 kg/m    Body mass index is 34.61 kg/m .  Physical Exam   GENERAL: healthy, alert and no distress  EYES: Eyes grossly normal to inspection, PERRL and conjunctivae and sclerae normal  HENT: ear canals and TM's normal, nose and mouth without ulcers or lesions. TTP over frontal and maxillary sinuses.   NECK: no adenopathy, no asymmetry, masses, or scars and thyroid normal to palpation  RESP: lungs clear to auscultation - no rales, rhonchi or wheezes  CV: regular rate and rhythm, normal S1 S2, no S3 or S4, no murmur, click or rub, no peripheral edema and peripheral pulses strong  ABDOMEN: soft, nontender, no hepatosplenomegaly, no masses and bowel sounds normal  MS: no gross musculoskeletal defects noted, no edema                      "

## 2023-12-19 NOTE — COMMUNITY RESOURCES LIST (ENGLISH)
12/19/2023   [facility]  N/A  For questions about this resource list or additional care needs, please contact your primary care clinic or care manager.  Phone: [phone]   Email: N/A   Address: [address] [place] [address]   Hours: N/A        Food and Nutrition       Food pantry  1  [employer] - Emergency Food Shelf Distance: 0.33 miles      Pickup   [address] [place] [address]  Language: English, Kyrgyz  Hours: Mon 9:30 AM - 11:30 AM Appt. Only  Fees: Free   Phone: (398) 640-3664 Email: ally@[employer] Website: http://www.[employer]occ[employer].org/     2  [facility]  the [facility] Distance: 0.78 miles      Pickup   [address] [place] [address]  Language: English  Hours: Mon - Fri 12:00 PM - 1:00 PM  Fees: Free   Phone: (472) 555-2464 Email: info@[employer].org Website: https://www.[facility].org/locations/_[place]_ymca     SNAP application assistance  3  Community Action Partnership ([facility]) [facility] Distance: 0.3 miles      Phone/Virtual   [address] [address]  Language: English  Hours: Mon - Fri 8:00 AM - 4:30 PM  Fees: Free   Phone: (552) 463-6562 Email: info@caprw.org Website: http://www.caprw.org/     4  [facility] Extension - SNAP Ed - SNAP Ed - SNAP application assistance Distance: 2.26 miles      In-Person   [address] [place] [address]  Language: English, Hmong, Oromo, Northern Irish, Kyrgyz  Hours: Mon - Fri 9:00 AM - 5:00 PM Appt. Only  Fees: Free   Phone: (991) 805-3626 Email: kang@[facility] Website: https://extension.[facility]/nutrition-education/snap-ed-educational-offerings     Soup kitchen or free meals  5  [employer] [place] Distance: 0.47 miles      Pickup   [address] [place] [address]  Language: English  Hours: Mon 12:00 PM - 2:00 PM , Wed 12:00 PM - 2:00 PM  Fees: Free   Phone: (143) 440-5707 Ext.4 Email: info@[employer].org  Website: http://www.openSchoooools.comidway.org     6  [place] [facility] [place] - [facility] - Free Summer Meals Distance: 0.7 miles      In-Person   [address] [place] [address]  Language: English  Hours: Mon - Fri 12:30 PM - 1:30 PM , Mon - Fri 4:00 PM - 5:00 PM  Fees: Free   Phone: (476) 195-9115 Email: Valerio@.[facility]. Website: https://www.[facility]/facilities/mhqzobt-hlrcnuydoq-yxtuqd          Transportation       Free or low-cost transportation  7  [facility] Distance: 1.92 miles      In-Person   [address] [place] [address]  Language: English, Latvian  Hours: Mon 9:00 AM - 5:00 PM , Tue 9:30 AM - 7:00 PM , Wed 9:00 AM - 5:00 PM , Thu 9:30 AM - 7:00 PM , Fri 9:00 AM - 5:00 PM  Fees: Free   Phone: (544) 258-7863 Email: contactus@"[employer] Website: http://www."[employer]     8  [employer] Distance: 5.1 miles      In-Person   [address] [place] [address]  Language: English  Hours: Mon - Fri 8:00 AM - 3:00 PM  Fees: Free   Phone: (995) 194-2650 Ext.14 Email: neighborhood@[facility].[employer] Website: http://www.[facility].[employer]     Transportation to medical appointments  9  [facility] - Non-Emergency Medical Transportation Distance: 2.73 miles      In-Person   [address] [place] [address]  Language: English  Hours: Mon - Fri 8:00 AM - 4:00 PM Appt. Only  Fees: Self Pay   Phone: (166) 573-3632 Website: http://www.all[facility]stockhealth.org/Medical-Services/Emergency-medical-services/Non-emergency-transportation/     10  Discover Ride Distance: 4.58 miles      In-Person   [address] [address]  Language: English  Hours: Mon - Thu 6:00 AM - 6:00 PM , Fri 6:00 AM - 5:00 PM  Fees: Insurance, Self Pay   Phone: (551) 506-4450 Email: office@[employer] Website: https://www.[employer]/          Important Numbers & Websites       Emergency Services   [address]  [place] [address]  Poison Control    (353) 767-4930  Suicide Prevention Lifeline   (758) 311-8890 (TALK)  Child Abuse Hotline   (127) 212-7308 (4-A-Child)  Sexual Assault Hotline   (112) 589-5026 (HOPE)  National Runaway Safeline   (294) 594-6942 (RUNAWAY)  All-Options Talkline   (378) 293-1955  Substance Abuse Referral   (465) 824-3840 (HELP)

## 2023-12-19 NOTE — COMMUNITY RESOURCES LIST (ENGLISH)
12/19/2023   [facility]  N/A  For additional resource needs, please contact your health insurance member services or your primary care team.  Phone: [phone]   Email: N/A   Address: [address] [place] [address]   Hours: N/A        Food and Nutrition       Food pantry  1  [employer] - Emergency Food Shelf Distance: 0.33 miles      Pickup   [address] [place] [address]  Language: English, Kinyarwanda  Hours: Mon 9:30 AM - 11:30 AM Appt. Only  Fees: Free   Phone: (288) 383-4859 Email: ally@[employer] Website: http://www.[employer].org/     2  [facility]  the [facility] Distance: 0.78 miles      Pickup   [address] [place] [address]  Language: English  Hours: Mon - Fri 12:00 PM - 1:00 PM  Fees: Free   Phone: (582) 485-9290 Email: info@[employer].org Website: https://www.[facility].org/locations/_[place]_ymca     SNAP application assistance  3  [employer] [place] Distance: 2.69 miles      Phone/Virtual   [address] [facility] [address] [place] [address]  Language: English, Hmong, Mexican, Citizen of [place], Kinyarwanda  Hours: Mon - Fri 8:30 AM - 4:30 PM  Fees: Free   Phone: (362) 377-8833 Email: helpline@hungersolutions.org Website: https://www.hungersolutions.org/programs/[place]-food-helpline/     4  Community Action Partnership (CAP) [facility] Distance: 0.3 miles      Phone/Virtual   [address] [place] [address]  Language: English  Hours: Mon - Fri 8:00 AM - 4:30 PM  Fees: Free   Phone: (572) 929-4236 Email: info@caprw.org Website: http://www.caprw.org/     Soup kitchen or free meals  5  [employer] [place] Distance: 0.47 miles      Pickup   [address] [place] [address]  Language: English  Hours: Mon 12:00 PM - 2:00 PM , Wed 12:00 PM - 2:00 PM  Fees: Free   Phone: (408) 746-1692 Ext.4 Email: info@[employer] Website: http://www.[employer]     6  [facility]  [facility] - Free Summer Meals Distance: 0.7 miles      In-Person   [address] [place] [address]  Language: English  Hours: Mon - Fri 12:30 PM - 1:30 PM , Mon - Fri 4:00 PM - 5:00 PM  Fees: Free   Phone: (753) 685-5903 Email: Valerio@.[facility]. Website: https://www.[facility]/facilities/bvtoypq-dwtpdpnhdq-empzvm          Transportation       Free or low-cost transportation  7  [employer] Distance: 1.92 miles      In-Person   [address] [place] [address]  Language: English, Setswana  Hours: Mon 9:00 AM - 5:00 PM , Tue 9:30 AM - 7:00 PM , Wed 9:00 AM - 5:00 PM , Thu 9:30 AM - 7:00 PM , Fri 9:00 AM - 5:00 PM  Fees: Free   Phone: (604) 792-6542 Email: contactus@[employer] Website: http://www.[employer]idy     8  [facility] Distance: 5.1 miles      In-Person   [address] [place] [address]  Language: English  Hours: Mon - Fri 8:00 AM - 3:00 PM  Fees: Free   Phone: (574) 703-9171 Ext.14 Email: lucina@[employer].[facility].[employer] Website: http://www.[facility].Intelligroup     Transportation to medical appointments  9  [facility] - Non-Emergency Medical Transportation Distance: 2.73 miles      In-Person   [address] [place] [address]  Language: English  Hours: Mon - Fri 8:00 AM - 4:00 PM Appt. Only  Fees: Self Pay   Phone: (315) 384-3100 Website: http://www.[facility].org/Medical-Services/Emergency-medical-services/Non-emergency-transportation/     10  Discover Ride Distance: 4.58 miles      In-Person   [address] [address]  Language: English  Hours: Mon - Thu 6:00 AM - 6:00 PM , Fri 6:00 AM - 5:00 PM  Fees: Insurance, Self Pay   Phone: (208) 756-5210 Email: office@[employer] Website: https://www.[employer]/          Important Numbers & Websites       [address].[facility]  Poison Control   (841) 529-5798 [facility].org  Suicide and Crisis Lifeline   886  988lifeline.org  Childhelp [place] Child Abuse Hotline   764.831.6204 Childhelphotline.org  National Sexual Assault Hotline   (547) 102-7404 (HOPE) Rainn.org  National Runaway Safeline   (246) 160-2606 (RUNAWAY) 1800runaway.org  Pregnancy & Postpartum Support [place]   Call/text 929-885-5725 Ppsupportmn.org  Substance Abuse National Helpline ([facility]   211-241-HELP (4363) Findtreatment.gov  Emergency Services   913

## 2023-12-21 ENCOUNTER — TELEPHONE (OUTPATIENT)
Dept: NURSING | Facility: CLINIC | Age: 28
End: 2023-12-21
Payer: COMMERCIAL

## 2023-12-21 ENCOUNTER — TELEPHONE (OUTPATIENT)
Dept: FAMILY MEDICINE | Facility: CLINIC | Age: 28
End: 2023-12-21
Payer: COMMERCIAL

## 2023-12-21 DIAGNOSIS — J01.10 ACUTE FRONTAL SINUSITIS, RECURRENCE NOT SPECIFIED: Primary | ICD-10-CM

## 2023-12-21 RX ORDER — AZITHROMYCIN 250 MG/1
TABLET, FILM COATED ORAL
Qty: 6 TABLET | Refills: 0 | Status: SHIPPED | OUTPATIENT
Start: 2023-12-21 | End: 2023-12-26

## 2023-12-21 NOTE — TELEPHONE ENCOUNTER
Spoke with patient and relayed message from PCP.     Reviewed when to switch antibiotics and to try taking doxycyline with a meal to reduce GI upset. Reviewed medication instructions and if the alternative antibiotic is picked up to stop doxycyline and just take Azithromycin.     Patient advised to return call if no symptom improvement or there is worsening.     No further questions at time of call.         Unfortunately, not sure if changing the antibiotic will make a difference on GI side effects. Specially because he also had GI upset with Augmentin as well.  Before changing the antibiotic, I would recommended taking the mediation with meals- hopefully that will decrease the GI effects.  If continues to vomit, then stop doxycycline and I sent another antibiotic- Azithromycin to take instead (I sent to preferred pharmacy but  only if really not tolerating doxy).    Common (and usually mild) side effects of taking antibiotics include:  Diarrhea.  Nausea and vomiting.  Dizziness.  Rash.  Yeast infections.    Thank you    Rhonda Fischer, CNP

## 2023-12-21 NOTE — TELEPHONE ENCOUNTER
Telephone call  Patient calling she was put on doxycycline  3 days ago for sinusitis.   Today when taking  doxycycline within  a hour he vomited.  He has been taking it with  a pouch of applesauce. He wants to know what he should do  if he should continue taking it or should he switch to a different antibiotic.  He is still feeling a little nauseated.  Routing to Provider    Kathryn Negron RN   [facility] Nurse Advisor  11:28 AM 12/21/2023

## 2023-12-30 DIAGNOSIS — F90.0 ATTENTION DEFICIT HYPERACTIVITY DISORDER (ADHD), PREDOMINANTLY INATTENTIVE TYPE: ICD-10-CM

## 2023-12-30 DIAGNOSIS — F64.9 GENDER DYSPHORIA: ICD-10-CM

## 2023-12-30 RX ORDER — DEXTROAMPHETAMINE SACCHARATE, AMPHETAMINE ASPARTATE MONOHYDRATE, DEXTROAMPHETAMINE SULFATE AND AMPHETAMINE SULFATE 2.5; 2.5; 2.5; 2.5 MG/1; MG/1; MG/1; MG/1
10 CAPSULE, EXTENDED RELEASE ORAL DAILY
Qty: 30 CAPSULE | Refills: 0 | Status: SHIPPED | OUTPATIENT
Start: 2023-12-30 | End: 2024-03-02

## 2023-12-30 RX ORDER — TESTOSTERONE CYPIONATE 200 MG/ML
INJECTION, SOLUTION INTRAMUSCULAR
Qty: 10 ML | Refills: 0 | Status: SHIPPED | OUTPATIENT
Start: 2023-12-30 | End: 2024-03-02

## 2024-01-03 DIAGNOSIS — F64.9 GENDER DYSPHORIA: Primary | ICD-10-CM

## 2024-01-03 RX ORDER — NEEDLES, SAFETY 18GX1 1/2"
0.4 NEEDLE, DISPOSABLE MISCELLANEOUS WEEKLY
Qty: 10 EACH | Refills: 0 | Status: SHIPPED | OUTPATIENT
Start: 2024-01-03 | End: 2024-03-02

## 2024-01-17 ENCOUNTER — OFFICE VISIT (OUTPATIENT)
Dept: FAMILY MEDICINE | Facility: CLINIC | Age: 29
End: 2024-01-17
Payer: COMMERCIAL

## 2024-01-17 VITALS
SYSTOLIC BLOOD PRESSURE: 128 MMHG | BODY MASS INDEX: 35.1 KG/M2 | WEIGHT: 210.7 LBS | DIASTOLIC BLOOD PRESSURE: 83 MMHG | HEIGHT: 65 IN | HEART RATE: 98 BPM | TEMPERATURE: 99.4 F | RESPIRATION RATE: 19 BRPM | OXYGEN SATURATION: 97 %

## 2024-01-17 DIAGNOSIS — Z13.228 SCREENING FOR ENDOCRINE, NUTRITIONAL, METABOLIC AND IMMUNITY DISORDER: ICD-10-CM

## 2024-01-17 DIAGNOSIS — F63.3 TRICHOTILLOMANIA IN ADULT: ICD-10-CM

## 2024-01-17 DIAGNOSIS — Z13.29 SCREENING FOR ENDOCRINE, NUTRITIONAL, METABOLIC AND IMMUNITY DISORDER: ICD-10-CM

## 2024-01-17 DIAGNOSIS — R29.898 RIGHT HAND WEAKNESS: ICD-10-CM

## 2024-01-17 DIAGNOSIS — R20.0 NUMBNESS AND TINGLING IN RIGHT HAND: ICD-10-CM

## 2024-01-17 DIAGNOSIS — Z13.21 SCREENING FOR ENDOCRINE, NUTRITIONAL, METABOLIC AND IMMUNITY DISORDER: ICD-10-CM

## 2024-01-17 DIAGNOSIS — Z00.00 ROUTINE GENERAL MEDICAL EXAMINATION AT A HEALTH CARE FACILITY: Primary | ICD-10-CM

## 2024-01-17 DIAGNOSIS — Z13.0 SCREENING FOR ENDOCRINE, NUTRITIONAL, METABOLIC AND IMMUNITY DISORDER: ICD-10-CM

## 2024-01-17 DIAGNOSIS — Z12.4 CERVICAL CANCER SCREENING: ICD-10-CM

## 2024-01-17 DIAGNOSIS — R20.2 NUMBNESS AND TINGLING IN RIGHT HAND: ICD-10-CM

## 2024-01-17 PROCEDURE — 91320 SARSCV2 VAC 30MCG TRS-SUC IM: CPT | Performed by: FAMILY MEDICINE

## 2024-01-17 PROCEDURE — 90471 IMMUNIZATION ADMIN: CPT | Performed by: FAMILY MEDICINE

## 2024-01-17 PROCEDURE — 90632 HEPA VACCINE ADULT IM: CPT | Performed by: FAMILY MEDICINE

## 2024-01-17 PROCEDURE — 99213 OFFICE O/P EST LOW 20 MIN: CPT | Mod: 25 | Performed by: FAMILY MEDICINE

## 2024-01-17 PROCEDURE — 99395 PREV VISIT EST AGE 18-39: CPT | Mod: 25 | Performed by: FAMILY MEDICINE

## 2024-01-17 PROCEDURE — G0145 SCR C/V CYTO,THINLAYER,RESCR: HCPCS | Performed by: FAMILY MEDICINE

## 2024-01-17 PROCEDURE — 90472 IMMUNIZATION ADMIN EACH ADD: CPT | Performed by: FAMILY MEDICINE

## 2024-01-17 PROCEDURE — 90480 ADMN SARSCOV2 VAC 1/ONLY CMP: CPT | Performed by: FAMILY MEDICINE

## 2024-01-17 PROCEDURE — 90686 IIV4 VACC NO PRSV 0.5 ML IM: CPT | Performed by: FAMILY MEDICINE

## 2024-01-17 ASSESSMENT — ENCOUNTER SYMPTOMS
HEMATOCHEZIA: 0
PARESTHESIAS: 0
FREQUENCY: 0
CONSTIPATION: 0
HEMATURIA: 0
HEARTBURN: 1
HEADACHES: 1
SORE THROAT: 0
DYSURIA: 0
PALPITATIONS: 0
JOINT SWELLING: 0
SHORTNESS OF BREATH: 0
ABDOMINAL PAIN: 0
DIARRHEA: 0
NERVOUS/ANXIOUS: 0
MYALGIAS: 1
WEAKNESS: 0
CHILLS: 0
ARTHRALGIAS: 1
COUGH: 0
EYE PAIN: 1
NAUSEA: 0
DIZZINESS: 0
FEVER: 0
BREAST MASS: 0

## 2024-01-17 NOTE — PROGRESS NOTES
Preventive Care Visit  [facility]  YULIA LAO MD, Family Medicine  Jan 17, 2024       SUBJECTIVE:   Elder is a 28 year old, presenting for the following:  Physical (28 year-old physical)        1/17/2024     1:18 PM   Additional Questions   Roomed by Angy   Accompanied by N/A         1/17/2024     1:18 PM   Patient Reported Additional Medications   Patient reports taking the following new medications Vitamin D     He was treated with testosterone for years but had to quit due to finanaices. Hoping to fill in the facial hair.  Not out to his parents. They are okay with pollard, they don't get it.  Has Nexplanon due to heavy, infrequent periods. Monogamous with a man, they do not use the vagina.  Had his first testosterone shot in a year and a half 5 days ago.   Does not want kids. No interest in saving eggs. Wants a gender affirming hysterectomy. Does not want ovaries. Undecided regarding the extent of bottom surgery yet.  Has had top surgery. Had counseling before.   Pain the right elbow for a month. The  strength of the ring and little finger is less now and sometimes gets pins and needles in those fingers when asleep or when on his phone.    Healthy Habits:     Getting at least 3 servings of Calcium per day:  Yes    Bi-annual eye exam:  Yes    Dental care twice a year:  NO    Sleep apnea or symptoms of sleep apnea:  Daytime drowsiness    Diet:  Regular (no restrictions)    Frequency of exercise:  1 day/week    Duration of exercise:  15-30 minutes    Taking medications regularly:  Yes    Medication side effects:  Other    Additional concerns today:  Yes  Have you ever done Advance Care Planning? (For example, a Health Directive, POLST, or a discussion with a medical provider or your loved ones about your wishes): Yes, advance care planning is on file.    Social History     Tobacco Use    Smoking status: Never    Smokeless tobacco: Never   Substance Use Topics    Alcohol use: Yes     Comment:  occ.             1/17/2024     1:02 AM   Alcohol Use   Prescreen: >3 drinks/day or >7 drinks/week? No   Reviewed orders with patient.  Reviewed health maintenance and updated orders accordingly - Yes  BP Readings from Last 3 Encounters:   01/17/24 128/83   12/19/23 115/81   10/06/22 100/70    Wt Readings from Last 3 Encounters:   01/17/24 95.6 kg (210 lb 11.2 oz)   12/19/23 94.3 kg (208 lb)   06/21/22 81.6 kg (180 lb)                  Patient Active Problem List   Diagnosis    ADHD (attention deficit hyperactivity disorder)    Anxiety    Major depressive disorder, recurrent, in full remission (H24)    Gender dysphoria    Migraine with aura and without status migrainosus, not intractable    Acid reflux     Past Surgical History:   Procedure Laterality Date    ADENOIDECTOMY      COSMETIC PLACEMENT OF DENTAL IMPLANT(S)      HC TOOTH EXTRACTION W/FORCEP      MASTECTOMY SIMPLE BILATERAL Bilateral 12/17/2021    Procedure: MASTECTOMY, BILATERAL, SIMPLE, WITH nipple grafts. OnQ;  Surgeon: Lashanda Mulligan MD;  Location: [facility] OR    TONSILLECTOMY         Social History     Tobacco Use    Smoking status: Never    Smokeless tobacco: Never   Substance Use Topics    Alcohol use: Yes     Comment: Never more than one, and goes weeks without.     Family History   Problem Relation Age of Onset    Thyroid Disease Mother     Impaired Fasting Glucose Father     No Known Problems Brother     Clotting Disorder Maternal Grandmother     Lung Cancer Maternal Grandmother     Cancer Maternal Grandfather         Colon Ca    Chronic Obstructive Pulmonary Disease Maternal Grandfather     Chronic Obstructive Pulmonary Disease Paternal Grandfather          Current Outpatient Medications   Medication Sig Dispense Refill    acetaminophen (TYLENOL) 325 MG tablet Take 325-650 mg by mouth every 6 hours as needed for mild pain      amphetamine-dextroamphetamine (ADDERALL XR) 10 MG 24 hr capsule Take 1 capsule (10 mg) by mouth daily 30 capsule 0     "fluticasone (FLONASE) 50 MCG/ACT nasal spray Spray 1 spray into both nostrils daily 9.9 mL 0    ibuprofen (ADVIL/MOTRIN) 200 MG capsule Take 200 mg by mouth every 4 hours as needed for fever       loratadine (CLARITIN) 10 MG tablet Take 1 tablet (10 mg) by mouth daily 30 tablet 0    SUMAtriptan (IMITREX) 25 MG tablet Take 25 mg by mouth at onset of headache for migraine      syringe/needle, sisp, (BD LUER-CLAYTON SYRINGE) 25G X 5/8\" 1 ML MISC Inject 0.4 mLs Subcutaneous once a week 10 each 0    testosterone cypionate (DEPOTESTOSTERONE) 200 MG/ML injection Inject 0.4mLs (80mg) into the muscle once a week. 10 mL 0    budesonide (RHINOCORT ALLERGY) 32 MCG/ACT nasal spray Spray 1 spray into both nostrils daily (Patient not taking: Reported on 1/17/2024) 8.43 mL 1     No Known Allergies    Breast Cancer Screening:    FHS-7:       1/17/2024     1:04 AM   Breast CA Risk Assessment (FHS-7)   Did any of your first-degree relatives have breast or ovarian cancer? No   Did any of your relatives have bilateral breast cancer? No   Did any man in your family have breast cancer? No   Did any woman in your family have breast and ovarian cancer? No   Did any woman in your family have breast cancer before age 50 y? No   Do you have 2 or more relatives with breast and/or ovarian cancer? No   Do you have 2 or more relatives with breast and/or bowel cancer? Unknown   History of abnormal Pap smear: NO - age 21-29 PAP every 3 years recommended. This will be his first.     Reviewed and updated as needed this visit by clinical staff   Tobacco  Allergies  Meds            Reviewed and updated as needed this visit by Provider                  OBJECTIVE:   /83 (BP Location: Left arm, Patient Position: Sitting, Cuff Size: Adult Regular)   Pulse 98   Temp 99.4  F (37.4  C) (Tympanic)   Resp 19   Ht 1.651 m (5' 5\")   Wt 95.6 kg (210 lb 11.2 oz)   LMP 01/01/2024 (Approximate)   SpO2 97%   Breastfeeding No   BMI 35.06 kg/m     Estimated " "body mass index is 35.06 kg/m  as calculated from the following:    Height as of this encounter: 1.651 m (5' 5\").    Weight as of this encounter: 95.6 kg (210 lb 11.2 oz).  Review of Systems   Constitutional:  Negative for chills and fever.   HENT:  Positive for ear pain. Negative for congestion, hearing loss and sore throat.    Eyes:  Positive for pain. Negative for visual disturbance.   Respiratory:  Negative for cough and shortness of breath.    Cardiovascular:  Negative for chest pain and palpitations.   Gastrointestinal:  Negative for abdominal pain, constipation, diarrhea and nausea.   Genitourinary:  Negative for dysuria, frequency, genital sores, hematuria, pelvic pain, penile discharge, urgency, vaginal bleeding and vaginal discharge.   Musculoskeletal:  Positive for arthralgias and myalgias. Negative for joint swelling.   Skin:  Negative for rash.   Neurological:  Positive for headaches. Negative for dizziness and weakness.   Psychiatric/Behavioral:  The patient is not nervous/anxious.        Physical Exam  GENERAL: alert and no distress  EYES: Eyes grossly normal to inspection, PERRL and conjunctivae and sclerae normal  HENT: ear canals and TM's normal, nose and mouth without ulcers or lesions  NECK: no adenopathy, no asymmetry, masses, or scars  RESP: lungs clear to auscultation - no rales, rhonchi or wheezes  CV: regular rate and rhythm, normal S1 S2, no S3 or S4, no murmur, click or rub, no peripheral edema  ABDOMEN: soft, nontender, no hepatosplenomegaly, no masses and bowel sounds normal   (female): normal female external genitalia, normal urethral meatus, vaginal mucosa pink, moist, well rugated  MS: no gross musculoskeletal defects noted, no edema  SKIN: no suspicious lesions or rashes  NEURO: Normal strength and tone, mentation intact and speech normal  PSYCH: mentation appears normal, affect normal/bright  LYMPH: no cervical, supraclavicular, axillary, or inguinal adenopathy    Diagnostic Test " "Results:  Labs reviewed in Epic  ASSESSMENT/PLAN:   Routine general medical examination  Cervical cancer screening  - Pap Screen reflex to HPV if ASCUS - recommend age 25 - 29  - HPV Hold (Lab Only)    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    Numbness and tingling in right hand  - WRIST SPLINT  - EMG    Right hand weakness  - WRIST SPLINT  - EMG    Screening for endocrine, nutritional, metabolic and immunity disorder  - Hepatitis B Surface Antibody  - Comprehensive metabolic panel  - CBC with Platelets & Differential  - TSH with free T4 reflex  - Testosterone total  - Albumin level  - Sex Hormone Binding Globulin  - Lipid Panel (BFP)    Counseling  Reviewed preventive health counseling, as reflected in patient instructions       Regular exercise       Healthy diet/nutrition       Contraception       Safe sex practices/STD prevention       PrEP retrovirus therapy for HIV prevention   BMI  Estimated body mass index is 35.06 kg/m  as calculated from the following:    Height as of this encounter: 1.651 m (5' 5\").    Weight as of this encounter: 95.6 kg (210 lb 11.2 oz).   He reports that he has never smoked. He has never used smokeless tobacco.      Signed Electronically by: YULIA LAO MD  Answers submitted by the patient for this visit:  Annual Preventive Visit (Submitted on 1/17/2024)  Chief Complaint: Annual Exam:  Blood in stool: No  heartburn: Yes  peripheral edema: No  mood changes: No  Skin sensation changes: No  tenderness: No  breast mass: No  breast discharge: No  impotence: No    "

## 2024-01-17 NOTE — COMMUNITY RESOURCES LIST (ENGLISH)
01/17/2024   [facility]  N/A  For questions about this resource list or additional care needs, please contact your primary care clinic or care manager.  Phone: [phone]   Email: N/A   Address: [address] [place] [address]   Hours: N/A        Food and Nutrition       Food pantry  1  [employer] - Emergency Food Shelf Distance: 0.33 miles      Pickup   [address] [place] [address]  Language: English, Solomon Islander  Hours: Mon 9:30 AM - 11:30 AM Appt. Only  Fees: Free   Phone: (597) 717-1297 Email: ally@[employer] Website: http://www.[employer].org/     2  [employer]  [facility] Distance: 0.78 miles      Pickup   [address] [place] [address]  Language: English  Hours: Mon - Fri 12:00 PM - 1:00 PM  Fees: Free   Phone: (482) 574-3740 Email: info@[employer].org Website: https://www.[facility].org/locations/_[place]_[employer]     SNAP application assistance  3  [employer] ([facility]) [facility] Distance: 0.3 miles      Phone/Virtual   [address] [address]  Language: English  Hours: Mon - Fri 8:00 AM - 4:30 PM  Fees: Free   Phone: (329) 106-1066 Email: info@[employer].org Website: http://www.caprw.org/     4  [facility] - SNAP Ed - SNAP Ed - SNAP application assistance Distance: 2.26 miles      In-Person   [address] [place] [address]  Language: English, Hmong, Oromo, Chinese, Solomon Islander  Hours: Mon - Fri 9:00 AM - 5:00 PM Appt. Only  Fees: Free   Phone: (802) 619-7192 Email: kang@[facility] Website: https://extension.[facility]/nutrition-education/snap-ed-educational-offerings     Soup kitchen or free meals  5  [employer] [facility] Distance: 0.47 miles      Pickup   [address] [place] [address]  Language: English  Hours: Mon 12:00 PM - 2:00 PM , Wed 12:00 PM - 2:00 PM  Fees: Free   Phone: (355) 215-6423 Ext.4 Email: info@[employer].org  Website: http://www.[employer].org     6  [facility] [place] [facility] - Free Summer Meals Distance: 0.81 miles      In-Person   [address] [place] [address]  Language: English  Hours: Mon - Fri 12:00 PM - 1:00 PM , Mon - Fri 3:00 PM - 4:00 PM  Fees: Free   Phone: (991) 408-6212 Email: meka@.[facility]. Website: https://www.[facility]/departments/cutler-recreation/[facility]          Transportation       Free or low-cost transportation  7  [facility] Distance: 1.92 miles      In-Person   [address] [place] [address]  Language: English, Faroese  Hours: Mon 9:00 AM - 5:00 PM , Tue 9:30 AM - 7:00 PM , Wed 9:00 AM - 5:00 PM , Thu 9:30 AM - 7:00 PM , Fri 9:00 AM - 5:00 PM  Fees: Free   Phone: (327) 119-9626 Email: contactus@[employer] Website: http://www.[employer]     8  [facility] Distance: 5.1 miles      In-Person   [address] [place] [address]  Language: English  Hours: Mon - Fri 8:00 AM - 3:00 PM  Fees: Free   Phone: (111) 196-3866 Ext.14 Email: neighborhood@[facility].[facility].[employer] Website: http://www.[employer].[facility].[employer]     Transportation to medical appointments  9  [facility] - Non-Emergency Medical Transportation Distance: 2.73 miles      In-Person   [address] [place] [address]  Language: English  Hours: Mon - Fri 8:00 AM - 4:00 PM Appt. Only  Fees: Self Pay   Phone: (491) 707-1451 Website: http://www.[facility].org/Medical-Services/Emergency-medical-services/Non-emergency-transportation/     10  [employer] Distance: 4.58 miles      In-Person   [address] [address]  Language: English  Hours: Mon - Thu 6:00 AM - 6:00 PM , Fri 6:00 AM - 5:00 PM  Fees: Insurance, Self Pay   Phone: (618) 256-1336 Email: office@[employer] Website: https://www.[employer]/          Important Numbers & Websites       Emergency Services   [address] [place] [address]    554  Poison Control   (526) 775-6065  Suicide Prevention Lifeline   (183) 445-7964 (TALK)  Child Abuse Hotline   (280) 319-7645 (4-A-Child)  Sexual Assault Hotline   (897) 217-3975 (HOPE)  National Runaway Safeline   (282) 313-9587 (RUNAWAY)  All-Options Talkline   (886) 849-3494  Substance Abuse Referral   (658) 937-1088 (HELP)

## 2024-01-17 NOTE — PATIENT INSTRUCTIONS
For the nerve test: [facility] will call you to coordinate your care as prescribed by your provider. If you don't hear from a representative within 2 business days, please call [phone].     Get the labs 5 days before my next appointment.

## 2024-01-20 PROBLEM — E66.812 CLASS 2 SEVERE OBESITY DUE TO EXCESS CALORIES WITH SERIOUS COMORBIDITY IN ADULT (H): Status: ACTIVE | Noted: 2024-01-20

## 2024-01-20 PROBLEM — E66.01 CLASS 2 SEVERE OBESITY DUE TO EXCESS CALORIES WITH SERIOUS COMORBIDITY IN ADULT (H): Status: ACTIVE | Noted: 2024-01-20

## 2024-01-22 LAB
BKR LAB AP GYN ADEQUACY: NORMAL
BKR LAB AP GYN INTERPRETATION: NORMAL
BKR LAB AP HPV REFLEX: NORMAL
BKR LAB AP LMP: NORMAL
BKR LAB AP PREVIOUS ABNORMAL: NORMAL
PATH REPORT.COMMENTS IMP SPEC: NORMAL
PATH REPORT.COMMENTS IMP SPEC: NORMAL
PATH REPORT.RELEVANT HX SPEC: NORMAL

## 2024-03-02 ENCOUNTER — MYC REFILL (OUTPATIENT)
Dept: INTERNAL MEDICINE | Facility: CLINIC | Age: 29
End: 2024-03-02
Payer: COMMERCIAL

## 2024-03-02 DIAGNOSIS — F90.0 ATTENTION DEFICIT HYPERACTIVITY DISORDER (ADHD), PREDOMINANTLY INATTENTIVE TYPE: ICD-10-CM

## 2024-03-02 DIAGNOSIS — F64.9 GENDER DYSPHORIA: ICD-10-CM

## 2024-03-05 RX ORDER — NEEDLES, SAFETY 18GX1 1/2"
0.4 NEEDLE, DISPOSABLE MISCELLANEOUS WEEKLY
Qty: 10 EACH | Refills: 5 | Status: SHIPPED | OUTPATIENT
Start: 2024-03-05

## 2024-03-05 RX ORDER — TESTOSTERONE CYPIONATE 200 MG/ML
INJECTION, SOLUTION INTRAMUSCULAR
Qty: 2 ML | Refills: 0 | Status: SHIPPED | OUTPATIENT
Start: 2024-03-05 | End: 2024-03-25

## 2024-03-05 RX ORDER — DEXTROAMPHETAMINE SACCHARATE, AMPHETAMINE ASPARTATE MONOHYDRATE, DEXTROAMPHETAMINE SULFATE AND AMPHETAMINE SULFATE 2.5; 2.5; 2.5; 2.5 MG/1; MG/1; MG/1; MG/1
10 CAPSULE, EXTENDED RELEASE ORAL DAILY
Qty: 30 CAPSULE | Refills: 0 | Status: SHIPPED | OUTPATIENT
Start: 2024-03-05 | End: 2024-04-25

## 2024-03-05 NOTE — TELEPHONE ENCOUNTER
03/05 I called and left message that we scheduled an appt for March 25th Monday arrive at 2:45 for a 3:00 appt, if unable to make it please call to reschedule

## 2024-03-18 ENCOUNTER — LAB (OUTPATIENT)
Dept: LAB | Facility: CLINIC | Age: 29
End: 2024-03-18
Payer: COMMERCIAL

## 2024-03-18 DIAGNOSIS — Z13.228 SCREENING FOR ENDOCRINE, NUTRITIONAL, METABOLIC AND IMMUNITY DISORDER: ICD-10-CM

## 2024-03-18 DIAGNOSIS — Z13.29 SCREENING FOR ENDOCRINE, NUTRITIONAL, METABOLIC AND IMMUNITY DISORDER: ICD-10-CM

## 2024-03-18 DIAGNOSIS — Z13.0 SCREENING FOR ENDOCRINE, NUTRITIONAL, METABOLIC AND IMMUNITY DISORDER: ICD-10-CM

## 2024-03-18 DIAGNOSIS — Z13.21 SCREENING FOR ENDOCRINE, NUTRITIONAL, METABOLIC AND IMMUNITY DISORDER: ICD-10-CM

## 2024-03-18 PROCEDURE — 84403 ASSAY OF TOTAL TESTOSTERONE: CPT

## 2024-03-18 PROCEDURE — 36415 COLL VENOUS BLD VENIPUNCTURE: CPT

## 2024-03-20 LAB — TESTOST SERPL-MCNC: 149 NG/DL (ref 8–950)

## 2024-03-25 ENCOUNTER — OFFICE VISIT (OUTPATIENT)
Dept: FAMILY MEDICINE | Facility: CLINIC | Age: 29
End: 2024-03-25
Payer: COMMERCIAL

## 2024-03-25 VITALS
RESPIRATION RATE: 16 BRPM | DIASTOLIC BLOOD PRESSURE: 68 MMHG | WEIGHT: 207 LBS | HEART RATE: 87 BPM | OXYGEN SATURATION: 96 % | SYSTOLIC BLOOD PRESSURE: 107 MMHG | BODY MASS INDEX: 35.34 KG/M2 | TEMPERATURE: 97.9 F | HEIGHT: 64 IN

## 2024-03-25 DIAGNOSIS — Z79.899 TRANSGENDER MAN ON HORMONE THERAPY: Primary | ICD-10-CM

## 2024-03-25 DIAGNOSIS — Z13.29 SCREENING FOR ENDOCRINE, NUTRITIONAL, METABOLIC AND IMMUNITY DISORDER: ICD-10-CM

## 2024-03-25 DIAGNOSIS — Z13.21 SCREENING FOR ENDOCRINE, NUTRITIONAL, METABOLIC AND IMMUNITY DISORDER: ICD-10-CM

## 2024-03-25 DIAGNOSIS — Z13.0 SCREENING FOR ENDOCRINE, NUTRITIONAL, METABOLIC AND IMMUNITY DISORDER: ICD-10-CM

## 2024-03-25 DIAGNOSIS — F64.0 TRANSGENDER MAN ON HORMONE THERAPY: Primary | ICD-10-CM

## 2024-03-25 DIAGNOSIS — Z13.228 SCREENING FOR ENDOCRINE, NUTRITIONAL, METABOLIC AND IMMUNITY DISORDER: ICD-10-CM

## 2024-03-25 LAB
BASOPHILS # BLD AUTO: 0 10E3/UL (ref 0–0.2)
BASOPHILS NFR BLD AUTO: 0 %
EOSINOPHIL # BLD AUTO: 0 10E3/UL (ref 0–0.7)
EOSINOPHIL NFR BLD AUTO: 0 %
ERYTHROCYTE [DISTWIDTH] IN BLOOD BY AUTOMATED COUNT: 12.9 % (ref 10–15)
HBV SURFACE AB SERPL IA-ACNC: <3.5 M[IU]/ML
HBV SURFACE AB SERPL IA-ACNC: NONREACTIVE M[IU]/ML
HCT VFR BLD AUTO: 44 % (ref 35–53)
HGB BLD-MCNC: 13.6 G/DL (ref 11.7–17.7)
IMM GRANULOCYTES # BLD: 0 10E3/UL
IMM GRANULOCYTES NFR BLD: 0 %
LYMPHOCYTES # BLD AUTO: 3 10E3/UL (ref 0.8–5.3)
LYMPHOCYTES NFR BLD AUTO: 35 %
MCH RBC QN AUTO: 27.9 PG (ref 26.5–33)
MCHC RBC AUTO-ENTMCNC: 30.9 G/DL (ref 31.5–36.5)
MCV RBC AUTO: 90 FL (ref 78–100)
MONOCYTES # BLD AUTO: 0.6 10E3/UL (ref 0–1.3)
MONOCYTES NFR BLD AUTO: 7 %
NEUTROPHILS # BLD AUTO: 5.1 10E3/UL (ref 1.6–8.3)
NEUTROPHILS NFR BLD AUTO: 58 %
PLATELET # BLD AUTO: 276 10E3/UL (ref 150–450)
RBC # BLD AUTO: 4.88 10E6/UL (ref 3.8–5.9)
SHBG SERPL-SCNC: 19 NMOL/L (ref 11–135)
WBC # BLD AUTO: 8.8 10E3/UL (ref 4–11)

## 2024-03-25 PROCEDURE — 99213 OFFICE O/P EST LOW 20 MIN: CPT | Performed by: FAMILY MEDICINE

## 2024-03-25 PROCEDURE — 84443 ASSAY THYROID STIM HORMONE: CPT | Performed by: FAMILY MEDICINE

## 2024-03-25 PROCEDURE — 84270 ASSAY OF SEX HORMONE GLOBUL: CPT | Performed by: FAMILY MEDICINE

## 2024-03-25 PROCEDURE — 36415 COLL VENOUS BLD VENIPUNCTURE: CPT | Performed by: FAMILY MEDICINE

## 2024-03-25 PROCEDURE — 85025 COMPLETE CBC W/AUTO DIFF WBC: CPT | Performed by: FAMILY MEDICINE

## 2024-03-25 PROCEDURE — 80053 COMPREHEN METABOLIC PANEL: CPT | Performed by: FAMILY MEDICINE

## 2024-03-25 PROCEDURE — 80061 LIPID PANEL: CPT | Performed by: FAMILY MEDICINE

## 2024-03-25 PROCEDURE — 86706 HEP B SURFACE ANTIBODY: CPT | Performed by: FAMILY MEDICINE

## 2024-03-25 ASSESSMENT — PATIENT HEALTH QUESTIONNAIRE - PHQ9
10. IF YOU CHECKED OFF ANY PROBLEMS, HOW DIFFICULT HAVE THESE PROBLEMS MADE IT FOR YOU TO DO YOUR WORK, TAKE CARE OF THINGS AT HOME, OR GET ALONG WITH OTHER PEOPLE: SOMEWHAT DIFFICULT
SUM OF ALL RESPONSES TO PHQ QUESTIONS 1-9: 5
SUM OF ALL RESPONSES TO PHQ QUESTIONS 1-9: 5

## 2024-03-25 ASSESSMENT — PAIN SCALES - GENERAL: PAINLEVEL: NO PAIN (0)

## 2024-03-25 NOTE — PROGRESS NOTES
"Assessment & Plan   Transgender man on hormone therapy  Increasing the dose from 0.4 to 0.5 ml (50 mg)  - testosterone cypionate (DEPOTESTOSTERONE) 200 MG/ML injection  Dispense: 3 mL; Refill: 3  Last test was a trough. Will get a peak at 3-4 days in three months.    Screening for endocrine, nutritional, metabolic and immunity disorder  - Lipid Profile  - Hepatitis B Surface Antibody  - Comprehensive metabolic panel  - CBC with Platelets & Differential  - TSH with free T4 reflex  - Sex Hormone Binding Globulin  BMI  Estimated body mass index is 35.53 kg/m  as calculated from the following:    Height as of this encounter: 1.626 m (5' 4\").    Weight as of this encounter: 93.9 kg (207 lb).   Cinda Friedman is a 28 year old, presenting for the following health issues:  office visit and Recheck Medication (Pt reports that they are here for follow up per Provider.)      3/25/2024     3:00 PM   Additional Questions   Roomed by isaiah   Accompanied by alone         3/25/2024     3:00 PM   Patient Reported Additional Medications   Patient reports taking the following new medications none     The testosterone shot was administered on the Friday prior to my lab appointment, which I believe was the 15th. I think my current dose is working okay and I am feeling good, though I would like to discuss methods to increase facial hair growth if possible, and also exercises to build core strength and arm muscle.  Only mental change is crying less.  Acne did not recur. Had some with starting testosterone-- which was mainly on the back and neck. He knows his skin is coarser.    History of Present Illness       Reason for visit:  Follow up for testosterone    He eats 2-3 servings of fruits and vegetables daily.He consumes 1 sweetened beverage(s) daily.He exercises with enough effort to increase his heart rate 9 or less minutes per day.  He exercises with enough effort to increase his heart rate 3 or less days per week.   He is taking " "medications regularly.       Objective    /68 (BP Location: Left arm, Patient Position: Sitting, Cuff Size: Adult Large)   Pulse 87   Temp 97.9  F (36.6  C) (Tympanic)   Resp 16   Ht 1.626 m (5' 4\")   Wt 93.9 kg (207 lb)   LMP  (LMP Unknown)   SpO2 96%   Breastfeeding No   BMI 35.53 kg/m    Body mass index is 35.53 kg/m .  Physical Exam   GENERAL: healthy, alert and no distress. Male appearance.  EYES: grossly normal to inspection, and conjunctivae and sclerae normal  RESP: breathing unlabored, no cough or throat clearing.  MS: no gross musculoskeletal defects noted.  SKIN: no suspicious lesions or rashes visible  NEURO: Normal strength and tone, mentation intact and speech normal  PSYCH: mentation appears normal, affect normal/bright     Lab on 03/18/2024   Component Date Value Ref Range Status    Testosterone Total 03/18/2024 149  8 - 950 ng/dL Final     Results for orders placed or performed in visit on 03/25/24   Hepatitis B Surface Antibody     Status: None   Result Value Ref Range    Hepatitis B Surface Antibody Nonreactive     Hepatitis B Surface Antibody Instrument Value <3.50 <8.5 m[IU]/mL   Comprehensive metabolic panel     Status: Normal   Result Value Ref Range    Sodium 138 135 - 145 mmol/L    Potassium 4.4 3.4 - 5.3 mmol/L    Carbon Dioxide (CO2) 23 22 - 29 mmol/L    Anion Gap 11 7 - 15 mmol/L    Urea Nitrogen 6.2 6.0 - 20.0 mg/dL    Creatinine 0.73 0.51 - 1.17 mg/dL    GFR Estimate >90 >60 mL/min/1.73m2    Calcium 9.4 8.6 - 10.0 mg/dL    Chloride 104 98 - 107 mmol/L    Glucose 90 70 - 99 mg/dL    Alkaline Phosphatase 90 40 - 150 U/L    AST 19 0 - 45 U/L    ALT 16 0 - 70 U/L    Protein Total 7.8 6.4 - 8.3 g/dL    Albumin 4.4 3.5 - 5.2 g/dL    Bilirubin Total 0.2 <=1.2 mg/dL    Narrative    The generation of reference intervals for this test is currently based on binary male or female sex. If the electronic health record information indicates another gender identity or if Legal Sex is " "recorded as \"Unknown\", both male and female reference intervals are provided where applicable, and should be considered according to the individual's appropriate clinical context.   TSH with free T4 reflex     Status: Normal   Result Value Ref Range    TSH 1.26 0.30 - 4.20 uIU/mL   Sex Hormone Binding Globulin     Status: Normal   Result Value Ref Range    Sex Hormone Binding Globulin 19 11 - 135 nmol/L    Narrative    The generation of reference intervals for this test is currently based on binary male or female sex. If the electronic health record information indicates another gender identity or if Legal Sex is recorded as \"Unknown\", both male and female reference intervals are provided where applicable, and should be considered according to the individual's appropriate clinical context.   Lipid Profile     Status: Abnormal   Result Value Ref Range    Cholesterol 129 <200 mg/dL    Triglycerides 81 <150 mg/dL    Direct Measure HDL 39 (L) >=40 mg/dL    LDL Cholesterol Calculated 74 <=100 mg/dL    Non HDL Cholesterol 90 <130 mg/dL    Patient Fasting > 8hrs? No     Narrative    Cholesterol  Desirable:  <200 mg/dL    Triglycerides  Normal:  Less than 150 mg/dL  Borderline High:  150-199 mg/dL  High:  200-499 mg/dL  Very High:  Greater than or equal to 500 mg/dL    Direct Measure HDL  Female:  Greater than or equal to 50 mg/dL   Male:  Greater than or equal to 40 mg/dL    LDL Cholesterol  Desirable:  <100mg/dL  Above Desirable:  100-129 mg/dL   Borderline High:  130-159 mg/dL   High:  160-189 mg/dL   Very High:  >= 190 mg/dL    Non HDL Cholesterol  Desirable:  130 mg/dL  Above Desirable:  130-159 mg/dL  Borderline High:  160-189 mg/dL  High:  190-219 mg/dL  Very High:  Greater than or equal to 220 mg/dL   CBC with platelets and differential     Status: Abnormal   Result Value Ref Range    WBC Count 8.8 4.0 - 11.0 10e3/uL    RBC Count 4.88 3.80 - 5.90 10e6/uL    Hemoglobin 13.6 11.7 - 17.7 g/dL    Hematocrit 44.0 35.0 - " "53.0 %    MCV 90 78 - 100 fL    MCH 27.9 26.5 - 33.0 pg    MCHC 30.9 (L) 31.5 - 36.5 g/dL    RDW 12.9 10.0 - 15.0 %    Platelet Count 276 150 - 450 10e3/uL    % Neutrophils 58 %    % Lymphocytes 35 %    % Monocytes 7 %    % Eosinophils 0 %    % Basophils 0 %    % Immature Granulocytes 0 %    Absolute Neutrophils 5.1 1.6 - 8.3 10e3/uL    Absolute Lymphocytes 3.0 0.8 - 5.3 10e3/uL    Absolute Monocytes 0.6 0.0 - 1.3 10e3/uL    Absolute Eosinophils 0.0 0.0 - 0.7 10e3/uL    Absolute Basophils 0.0 0.0 - 0.2 10e3/uL    Absolute Immature Granulocytes 0.0 <=0.4 10e3/uL    Narrative    The generation of reference intervals for this test is currently based on binary male or female sex. If the electronic health record information indicates another gender identity or if Legal Sex is recorded as \"Unknown\", both male and female reference intervals are provided where applicable, and should be considered according to the individual's appropriate clinical context.   CBC with Platelets & Differential     Status: Abnormal    Narrative    The following orders were created for panel order CBC with Platelets & Differential.  Procedure                               Abnormality         Status                     ---------                               -----------         ------                     CBC with platelets and d...[[id number]]  Abnormal            Final result                 Please view results for these tests on the individual orders.   Signed Electronically by: YULIA LAO MD    "

## 2024-03-26 LAB
ALBUMIN SERPL BCG-MCNC: 4.4 G/DL (ref 3.5–5.2)
ALP SERPL-CCNC: 90 U/L (ref 40–150)
ALT SERPL W P-5'-P-CCNC: 16 U/L (ref 0–70)
ANION GAP SERPL CALCULATED.3IONS-SCNC: 11 MMOL/L (ref 7–15)
AST SERPL W P-5'-P-CCNC: 19 U/L (ref 0–45)
BILIRUB SERPL-MCNC: 0.2 MG/DL
BUN SERPL-MCNC: 6.2 MG/DL (ref 6–20)
CALCIUM SERPL-MCNC: 9.4 MG/DL (ref 8.6–10)
CHLORIDE SERPL-SCNC: 104 MMOL/L (ref 98–107)
CHOLEST SERPL-MCNC: 129 MG/DL
CREAT SERPL-MCNC: 0.73 MG/DL (ref 0.51–1.17)
DEPRECATED HCO3 PLAS-SCNC: 23 MMOL/L (ref 22–29)
EGFRCR SERPLBLD CKD-EPI 2021: >90 ML/MIN/1.73M2
FASTING STATUS PATIENT QL REPORTED: NO
GLUCOSE SERPL-MCNC: 90 MG/DL (ref 70–99)
HDLC SERPL-MCNC: 39 MG/DL
LDLC SERPL CALC-MCNC: 74 MG/DL
NONHDLC SERPL-MCNC: 90 MG/DL
POTASSIUM SERPL-SCNC: 4.4 MMOL/L (ref 3.4–5.3)
PROT SERPL-MCNC: 7.8 G/DL (ref 6.4–8.3)
SODIUM SERPL-SCNC: 138 MMOL/L (ref 135–145)
TRIGL SERPL-MCNC: 81 MG/DL
TSH SERPL DL<=0.005 MIU/L-ACNC: 1.26 UIU/ML (ref 0.3–4.2)

## 2024-04-02 RX ORDER — TESTOSTERONE CYPIONATE 200 MG/ML
INJECTION, SOLUTION INTRAMUSCULAR
Qty: 3 ML | Refills: 3 | Status: SHIPPED | OUTPATIENT
Start: 2024-04-02 | End: 2024-06-26

## 2024-04-25 ENCOUNTER — MYC REFILL (OUTPATIENT)
Dept: INTERNAL MEDICINE | Facility: CLINIC | Age: 29
End: 2024-04-25
Payer: COMMERCIAL

## 2024-04-25 DIAGNOSIS — F64.0 TRANSGENDER MAN ON HORMONE THERAPY: ICD-10-CM

## 2024-04-25 DIAGNOSIS — Z79.899 TRANSGENDER MAN ON HORMONE THERAPY: ICD-10-CM

## 2024-04-25 DIAGNOSIS — F90.0 ATTENTION DEFICIT HYPERACTIVITY DISORDER (ADHD), PREDOMINANTLY INATTENTIVE TYPE: ICD-10-CM

## 2024-04-25 RX ORDER — DEXTROAMPHETAMINE SACCHARATE, AMPHETAMINE ASPARTATE MONOHYDRATE, DEXTROAMPHETAMINE SULFATE AND AMPHETAMINE SULFATE 2.5; 2.5; 2.5; 2.5 MG/1; MG/1; MG/1; MG/1
10 CAPSULE, EXTENDED RELEASE ORAL DAILY
Qty: 30 CAPSULE | Refills: 0 | Status: SHIPPED | OUTPATIENT
Start: 2024-04-25 | End: 2024-05-28

## 2024-04-25 RX ORDER — TESTOSTERONE CYPIONATE 200 MG/ML
INJECTION, SOLUTION INTRAMUSCULAR
Qty: 3 ML | Refills: 3 | OUTPATIENT
Start: 2024-04-25

## 2024-05-09 ENCOUNTER — OFFICE VISIT (OUTPATIENT)
Dept: NEUROLOGY | Facility: CLINIC | Age: 29
End: 2024-05-09
Payer: COMMERCIAL

## 2024-05-09 DIAGNOSIS — R20.2 NUMBNESS AND TINGLING IN RIGHT HAND: ICD-10-CM

## 2024-05-09 DIAGNOSIS — R29.898 RIGHT HAND WEAKNESS: ICD-10-CM

## 2024-05-09 DIAGNOSIS — R20.0 NUMBNESS AND TINGLING IN RIGHT HAND: ICD-10-CM

## 2024-05-09 PROCEDURE — 95886 MUSC TEST DONE W/N TEST COMP: CPT | Performed by: INTERNAL MEDICINE

## 2024-05-09 PROCEDURE — 95908 NRV CNDJ TST 3-4 STUDIES: CPT | Performed by: INTERNAL MEDICINE

## 2024-05-09 NOTE — LETTER
2024         RE: Elva Aaron  [address] [place] [address]        Dear Colleague,    Thank you for referring your patient, Elva Aaron, to the [facility] [place]. Please see a copy of my visit note below.                        [facility]  Electrodiagnostic Laboratory                 Department of Neurology                                                                                                         Test Date:  2024    Patient: Elder Aaron : 1995 Physician: Marcelo Dickinson MD   Sex: Male AGE: 28 year Ref Phys:    ID#: [id number]   Technician: Marcella Cramer     History and Examination:  Patient is a 27 yo male who presents for evaluation of intermittent right upper extremity pains and numbness/tingling. EMG ordered to evaluate for focal neuropathy or radiculopathy.     Techniques:  Motor conduction studies were done with surface recording electrodes. Sensory conduction studies were performed with surface electrodes, unless indicated otherwise by (n), designating the use of subdermal recording electrodes. Temperature was monitored and recorded throughout the study. Upper extremities were maintained at a temperature of 32 degrees Centigrade or higher.  EMG was done with a concentric needle electrode.     Results:  All F Wave latencies, motor and sensory nerve studies were within normal limits.      Needle EMG of the right upper extremity is normal.       Interpretation:  This is a normal study. In particular, there is no electrophysiologic evidence of focal neuropathy or radiculopathy in the right upper extremity.     ___________________________  Marcelo Dickinson MD        Nerve Conduction Studies  Motor Sites      Latency Amplitude Neg. Amp Diff Segment Distance Velocity Neg. Dur Neg Area Diff Temperature Comment   Site (ms) Norm (mV) Norm (%)  cm m/s Norm (ms) (%) ( C)    Right Median (APB) Motor   Wrist 2.7  < 4.4 16.6  > 5.0   Wrist-APB 8   4.3  32.2    Elbow 5.9 - 15.8  > 5.0 -5 Elbow-Wrist 17.7 55  > 48 4.3 -3 32.3    Right Ulnar (ADM) Motor   Wrist 2.6  < 3.5 12.5  > 5.0  Wrist-ADM 8   4.9  32.5    Below Elbow 5.1 - 11.5 - -8 Below Elbow-Wrist 17 68  > 48 5.2 -3 32.5    Above Elbow 6.1 - 12.1 - 5 Above Elbow-Below Elbow 7.5 75  > 48 5.5 2 32.4      F-Wave Sites      Min F-Lat Max-Min F-Lat Mean F-Lat   Site (ms) (ms) (ms)   Right Ulnar F-Wave   Wrist 22.7 0.80 23.0     Sensory Sites      Onset Lat Peak Lat Amp (O-P) Amp (P-P) Segment Distance Velocity Temperature Comment   Site ms (ms)  V Norm ( V)  cm m/s Norm ( C)    Right Median Sensory   Wrist-Dig II 2.4 3.1 61  > 10 93 Wrist-Dig II 15 63  > 48 32.3    Right Median-Ulnar Palmar Sensory        Median   Palm-Wrist 1.13 1.58 87 - 105 Palm-Wrist 8 71 - 32.4         Ulnar   Palm-Wrist 1.08 1.45 29 - 44 Palm-Wrist 8 74 - 32.4    Right Ulnar Sensory   Wrist-Dig V 1.95 2.7 87  > 8 133 Wrist-Dig V 12.5 64  > 48 32.3      Inter-Nerve Comparisons     Nerve 1 Value 1 Nerve 2 Value 2 Parameter Result Normal   Sensory Sites   R Median Palm-Wrist 1.58 ms R Ulnar Palm-Wrist 1.45 ms Peak Lat Diff 0.13 ms <0.40       Electromyography     Side Muscle Ins Act Fibs/PSW Fasc HF Amp Dur Poly Recrt Int Pat   Right Biceps Nml None Nml 0 Nml Nml 0 Nml Nml   Right Deltoid Nml None Nml 0 Nml Nml 0 Nml Nml   Right ED Nml None Nml 0 Nml Nml 0 Nml Nml   Right FDI Nml None Nml 0 Nml Nml 0 Nml Nml   Right C7 PSP Nml None Nml 0        Right Triceps Nml None Nml 0 Nml Nml 0 Nml Nml         NCS Waveforms:    Motor         Sensory           F-Wave           Again, thank you for allowing me to participate in the care of your patient.        Sincerely,        Marcelo Dickinson MD

## 2024-05-09 NOTE — PROGRESS NOTES
[facility]  Electrodiagnostic Laboratory                 Department of Neurology                                                                                                         Test Date:  2024    Patient: Elder Aaron : 1995 Physician: Marcelo Dickinson MD   Sex: Male AGE: 28 year Ref Phys:    ID#: [id number]   Technician: Marcella Cramer     History and Examination:  Patient is a 29 yo male who presents for evaluation of intermittent right upper extremity pains and numbness/tingling. EMG ordered to evaluate for focal neuropathy or radiculopathy.     Techniques:  Motor conduction studies were done with surface recording electrodes. Sensory conduction studies were performed with surface electrodes, unless indicated otherwise by (n), designating the use of subdermal recording electrodes. Temperature was monitored and recorded throughout the study. Upper extremities were maintained at a temperature of 32 degrees Centigrade or higher.  EMG was done with a concentric needle electrode.     Results:  All F Wave latencies, motor and sensory nerve studies were within normal limits.      Needle EMG of the right upper extremity is normal.       Interpretation:  This is a normal study. In particular, there is no electrophysiologic evidence of focal neuropathy or radiculopathy in the right upper extremity.     ___________________________  Marcelo Dickinson MD        Nerve Conduction Studies  Motor Sites      Latency Amplitude Neg. Amp Diff Segment Distance Velocity Neg. Dur Neg Area Diff Temperature Comment   Site (ms) Norm (mV) Norm (%)  cm m/s Norm (ms) (%) ( C)    Right Median (APB) Motor   Wrist 2.7  < 4.4 16.6  > 5.0  Wrist-APB 8   4.3  32.2    Elbow 5.9 - 15.8  > 5.0 -5 Elbow-Wrist 17.7 55  > 48 4.3 -3 32.3    Right Ulnar (ADM) Motor   Wrist 2.6  < 3.5 12.5  > 5.0  Wrist-ADM 8   4.9  32.5    Below Elbow 5.1 - 11.5 - -8 Below Elbow-Wrist 17 68  > 48 5.2 -3 32.5    Above Elbow 6.1 -  12.1 - 5 Above Elbow-Below Elbow 7.5 75  > 48 5.5 2 32.4      F-Wave Sites      Min F-Lat Max-Min F-Lat Mean F-Lat   Site (ms) (ms) (ms)   Right Ulnar F-Wave   Wrist 22.7 0.80 23.0     Sensory Sites      Onset Lat Peak Lat Amp (O-P) Amp (P-P) Segment Distance Velocity Temperature Comment   Site ms (ms)  V Norm ( V)  cm m/s Norm ( C)    Right Median Sensory   Wrist-Dig II 2.4 3.1 61  > 10 93 Wrist-Dig II 15 63  > 48 32.3    Right Median-Ulnar Palmar Sensory        Median   Palm-Wrist 1.13 1.58 87 - 105 Palm-Wrist 8 71 - 32.4         Ulnar   Palm-Wrist 1.08 1.45 29 - 44 Palm-Wrist 8 74 - 32.4    Right Ulnar Sensory   Wrist-Dig V 1.95 2.7 87  > 8 133 Wrist-Dig V 12.5 64  > 48 32.3      Inter-Nerve Comparisons     Nerve 1 Value 1 Nerve 2 Value 2 Parameter Result Normal   Sensory Sites   R Median Palm-Wrist 1.58 ms R Ulnar Palm-Wrist 1.45 ms Peak Lat Diff 0.13 ms <0.40       Electromyography     Side Muscle Ins Act Fibs/PSW Fasc HF Amp Dur Poly Recrt Int Pat   Right Biceps Nml None Nml 0 Nml Nml 0 Nml Nml   Right Deltoid Nml None Nml 0 Nml Nml 0 Nml Nml   Right ED Nml None Nml 0 Nml Nml 0 Nml Nml   Right FDI Nml None Nml 0 Nml Nml 0 Nml Nml   Right C7 PSP Nml None Nml 0        Right Triceps Nml None Nml 0 Nml Nml 0 Nml Nml         NCS Waveforms:    Motor         Sensory           F-Wave

## 2024-05-28 ENCOUNTER — MYC REFILL (OUTPATIENT)
Dept: INTERNAL MEDICINE | Facility: CLINIC | Age: 29
End: 2024-05-28
Payer: COMMERCIAL

## 2024-05-28 DIAGNOSIS — F90.0 ATTENTION DEFICIT HYPERACTIVITY DISORDER (ADHD), PREDOMINANTLY INATTENTIVE TYPE: ICD-10-CM

## 2024-05-29 RX ORDER — DEXTROAMPHETAMINE SACCHARATE, AMPHETAMINE ASPARTATE MONOHYDRATE, DEXTROAMPHETAMINE SULFATE AND AMPHETAMINE SULFATE 2.5; 2.5; 2.5; 2.5 MG/1; MG/1; MG/1; MG/1
10 CAPSULE, EXTENDED RELEASE ORAL DAILY
Qty: 30 CAPSULE | Refills: 0 | Status: SHIPPED | OUTPATIENT
Start: 2024-05-29 | End: 2024-06-14

## 2024-06-14 ENCOUNTER — MYC REFILL (OUTPATIENT)
Dept: INTERNAL MEDICINE | Facility: CLINIC | Age: 29
End: 2024-06-14
Payer: COMMERCIAL

## 2024-06-14 DIAGNOSIS — F90.0 ATTENTION DEFICIT HYPERACTIVITY DISORDER (ADHD), PREDOMINANTLY INATTENTIVE TYPE: ICD-10-CM

## 2024-06-17 RX ORDER — DEXTROAMPHETAMINE SACCHARATE, AMPHETAMINE ASPARTATE MONOHYDRATE, DEXTROAMPHETAMINE SULFATE AND AMPHETAMINE SULFATE 2.5; 2.5; 2.5; 2.5 MG/1; MG/1; MG/1; MG/1
10 CAPSULE, EXTENDED RELEASE ORAL DAILY
Qty: 30 CAPSULE | Refills: 0 | Status: SHIPPED | OUTPATIENT
Start: 2024-06-17 | End: 2024-06-26 | Stop reason: ALTCHOICE

## 2024-06-20 ENCOUNTER — DOCUMENTATION ONLY (OUTPATIENT)
Dept: FAMILY MEDICINE | Facility: CLINIC | Age: 29
End: 2024-06-20
Payer: COMMERCIAL

## 2024-06-20 DIAGNOSIS — F64.0 TRANSGENDER MAN ON HORMONE THERAPY: Primary | ICD-10-CM

## 2024-06-20 DIAGNOSIS — Z79.899 TRANSGENDER MAN ON HORMONE THERAPY: Primary | ICD-10-CM

## 2024-06-20 NOTE — PROGRESS NOTES
Patient will be coming to lab on 6/21/24 at 1300 for blood works per Dr. Arzola.  Unfortunately there are no labs ordered in her chart. Please review and future orders if needed before patient's appointment.    Thank you,    Lab

## 2024-06-21 ENCOUNTER — LAB (OUTPATIENT)
Dept: LAB | Facility: CLINIC | Age: 29
End: 2024-06-21
Payer: COMMERCIAL

## 2024-06-21 DIAGNOSIS — Z79.899 TRANSGENDER PERSON ON HORMONE THERAPY: Primary | ICD-10-CM

## 2024-06-21 DIAGNOSIS — Z79.899 TRANSGENDER MAN ON HORMONE THERAPY: ICD-10-CM

## 2024-06-21 DIAGNOSIS — F64.0 TRANSGENDER PERSON ON HORMONE THERAPY: Primary | ICD-10-CM

## 2024-06-21 DIAGNOSIS — F64.0 TRANSGENDER MAN ON HORMONE THERAPY: ICD-10-CM

## 2024-06-21 LAB
ALBUMIN SERPL BCG-MCNC: 4.4 G/DL (ref 3.5–5.2)
CHOLEST SERPL-MCNC: 143 MG/DL
FASTING STATUS PATIENT QL REPORTED: ABNORMAL
HCT VFR BLD AUTO: 43.7 % (ref 35–53)
HDLC SERPL-MCNC: 37 MG/DL
HGB BLD-MCNC: 13.8 G/DL (ref 11.7–17.7)
LDLC SERPL CALC-MCNC: 92 MG/DL
NONHDLC SERPL-MCNC: 106 MG/DL
SHBG SERPL-SCNC: 14 NMOL/L (ref 11–135)
TRIGL SERPL-MCNC: 72 MG/DL

## 2024-06-21 PROCEDURE — 82040 ASSAY OF SERUM ALBUMIN: CPT

## 2024-06-21 PROCEDURE — 85014 HEMATOCRIT: CPT

## 2024-06-21 PROCEDURE — 80061 LIPID PANEL: CPT

## 2024-06-21 PROCEDURE — 36415 COLL VENOUS BLD VENIPUNCTURE: CPT

## 2024-06-21 PROCEDURE — 84270 ASSAY OF SEX HORMONE GLOBUL: CPT

## 2024-06-21 PROCEDURE — 85018 HEMOGLOBIN: CPT

## 2024-06-21 PROCEDURE — 84403 ASSAY OF TOTAL TESTOSTERONE: CPT

## 2024-06-26 ENCOUNTER — OFFICE VISIT (OUTPATIENT)
Dept: FAMILY MEDICINE | Facility: CLINIC | Age: 29
End: 2024-06-26
Payer: COMMERCIAL

## 2024-06-26 VITALS
RESPIRATION RATE: 20 BRPM | HEIGHT: 64 IN | BODY MASS INDEX: 35.37 KG/M2 | TEMPERATURE: 98.8 F | HEART RATE: 107 BPM | SYSTOLIC BLOOD PRESSURE: 106 MMHG | OXYGEN SATURATION: 98 % | WEIGHT: 207.2 LBS | DIASTOLIC BLOOD PRESSURE: 78 MMHG

## 2024-06-26 DIAGNOSIS — Z79.899 TRANSGENDER MAN ON HORMONE THERAPY: ICD-10-CM

## 2024-06-26 DIAGNOSIS — F64.0 TRANSGENDER MAN ON HORMONE THERAPY: ICD-10-CM

## 2024-06-26 DIAGNOSIS — F90.0 ATTENTION DEFICIT HYPERACTIVITY DISORDER (ADHD), PREDOMINANTLY INATTENTIVE TYPE: Primary | ICD-10-CM

## 2024-06-26 LAB — TESTOST SERPL-MCNC: 441 NG/DL (ref 8–950)

## 2024-06-26 PROCEDURE — G2211 COMPLEX E/M VISIT ADD ON: HCPCS | Performed by: FAMILY MEDICINE

## 2024-06-26 PROCEDURE — 99214 OFFICE O/P EST MOD 30 MIN: CPT | Performed by: FAMILY MEDICINE

## 2024-06-26 RX ORDER — TESTOSTERONE CYPIONATE 200 MG/ML
INJECTION, SOLUTION INTRAMUSCULAR
Qty: 6 ML | Refills: 1 | Status: SHIPPED | OUTPATIENT
Start: 2024-06-26 | End: 2024-09-27

## 2024-06-26 RX ORDER — LISDEXAMFETAMINE DIMESYLATE 10 MG/1
10 CAPSULE ORAL DAILY
Qty: 30 CAPSULE | Refills: 0 | Status: SHIPPED | OUTPATIENT
Start: 2024-07-27 | End: 2024-07-24 | Stop reason: DRUGHIGH

## 2024-06-26 RX ORDER — LISDEXAMFETAMINE DIMESYLATE 10 MG/1
10 CAPSULE ORAL DAILY
Qty: 30 CAPSULE | Refills: 0 | Status: SHIPPED | OUTPATIENT
Start: 2024-08-27 | End: 2024-07-24 | Stop reason: DRUGHIGH

## 2024-06-26 RX ORDER — LISDEXAMFETAMINE DIMESYLATE 10 MG/1
10 CAPSULE ORAL DAILY
Qty: 30 CAPSULE | Refills: 0 | Status: SHIPPED | OUTPATIENT
Start: 2024-06-26 | End: 2024-07-24 | Stop reason: DRUGHIGH

## 2024-06-26 NOTE — PROGRESS NOTES
"Assessment & Plan   Attention deficit hyperactivity disorder (ADHD), predominantly inattentive type  Probably meets the criteria for binge eating. Discussed not having even single servings of food at his desk.  Discussed improved focus with getting up and moving every 25 minutes as well as protecting the eyes by not looking at a screen more than 20 minutes at a time.  Books recommended.  He is in counseling.  Explained that controlled substances require routine follow-up every three months. He agrees.  - lisdexamfetamine (VYVANSE) 10 MG capsule  Dispense: 30 capsule; Refill: 0  - lisdexamfetamine (VYVANSE) 10 MG capsule  Dispense: 30 capsule; Refill: 0  - lisdexamfetamine (VYVANSE) 10 MG capsule  Dispense: 30 capsule; Refill: 0    Transgender man on hormone therapy  Labs were appropriate.   Changes are satisfactory for him.  - Testosterone total  - Hemoglobin and hematocrit  - Albumin level  - Sex Hormone Binding Globulin  - Lipid Panel (BFP)  - testosterone cypionate (DEPOTESTOSTERONE) 200 MG/ML injection  Dispense: 6 mL; Refill: 1    Return in three months.    The longitudinal plan of care for the diagnosis(es)/condition(s) as documented were addressed during this visit. Due to the added complexity in care, I will continue to support Elder in the subsequent management and with ongoing continuity of care.  BMI  Estimated body mass index is 35.57 kg/m  as calculated from the following:    Height as of this encounter: 1.626 m (5' 4\").    Weight as of this encounter: 94 kg (207 lb 3.2 oz).     Subjective   Elder is a 28 year old, presenting for the following health issues:  Follow Up (3 mo f/u)      6/26/2024    12:17 PM   Additional Questions   Roomed by PETEY Blakely     More sweaty. Hair is thinning, but that okay with the patient, he wants a more masculine hairline.  When not on adderall, he would eat more often.    History of Present Illness       Reason for visit:  Blood work/testosterone follow up    He eats 2-3 " "servings of fruits and vegetables daily.He consumes 1 sweetened beverage(s) daily.He exercises with enough effort to increase his heart rate 10 to 19 minutes per day.  He exercises with enough effort to increase his heart rate 4 days per week.   He is taking medications regularly.       Objective    /78 (BP Location: Left arm, Patient Position: Sitting, Cuff Size: Adult Regular)   Pulse 107   Temp 98.8  F (37.1  C) (Tympanic)   Resp 20   Ht 1.626 m (5' 4\")   Wt 94 kg (207 lb 3.2 oz)   SpO2 98%   BMI 35.57 kg/m    Body mass index is 35.57 kg/m .  Physical Exam   GENERAL: healthy, alert and no distress  EYES: grossly normal to inspection, and conjunctivae and sclerae normal  RESP: breathing unlabored, no cough or throat clearing.  MS: no gross musculoskeletal defects noted.  SKIN: no suspicious lesions or rashes visible  NEURO: Normal strength and tone, mentation intact and speech normal  PSYCH: mentation appears normal, affect normal/bright   Lab on 06/21/2024   Component Date Value Ref Range Status    Testosterone Total 06/21/2024 441  8 - 950 ng/dL Final    Hemoglobin 06/21/2024 13.8  11.7 - 17.7 g/dL Final    Reference Range:                                                     Female 11.7-15.7 g/dL                                      Male 13.3-17.7 g/dL    Hematocrit 06/21/2024 43.7  35.0 - 53.0 % Final    Sex Specific Reference Ranges: Female  35.0-47.0 % Male  40.0-53.0 %    Albumin 06/21/2024 4.4  3.5 - 5.2 g/dL Final    Sex Hormone Binding Globulin 06/21/2024 14  11 - 135 nmol/L Final    Female Reference Range:  Manish Stage I:  nmol/L  Manish Stage II:  nmol/L  Manish Stage III: 12-98 nmol/L  Manish Stage IV:  nmol/L  Manish Stage V:  nmol/L    Male Reference Range:  Manish Stage I:  nmol/L  Manish Stage II:  nmol/L  Manish Stage III:  nmol/L  Manish Stage IV: 11-60 nmol/L  Manish Stage V: 11-71 nmol/L    Cholesterol 06/21/2024 143  <200 mg/dL Final    " Triglycerides 06/21/2024 72  <150 mg/dL Final    Direct Measure HDL 06/21/2024 37 (L)  >=40 mg/dL Final    LDL Cholesterol Calculated 06/21/2024 92  <=100 mg/dL Final    Non HDL Cholesterol 06/21/2024 106  <130 mg/dL Final    Patient Fasting > 8hrs? 06/21/2024 Unknown   Final   Signed Electronically by: YULIA LAO MD

## 2024-06-27 ENCOUNTER — MYC REFILL (OUTPATIENT)
Dept: FAMILY MEDICINE | Facility: CLINIC | Age: 29
End: 2024-06-27
Payer: COMMERCIAL

## 2024-06-27 DIAGNOSIS — F64.0 TRANSGENDER MAN ON HORMONE THERAPY: ICD-10-CM

## 2024-06-27 DIAGNOSIS — Z79.899 TRANSGENDER MAN ON HORMONE THERAPY: ICD-10-CM

## 2024-06-27 RX ORDER — TESTOSTERONE CYPIONATE 200 MG/ML
INJECTION, SOLUTION INTRAMUSCULAR
Qty: 6 ML | Refills: 1 | OUTPATIENT
Start: 2024-06-27

## 2024-06-27 NOTE — PATIENT INSTRUCTIONS
o  The Smart but Scattered Guide to Success: How to Use Your Brain s Executive Skills to Keep Up, Stay Calm, and Get Organized at Work and at Home. By Melly  o  Understand Your Brain, Get More Done: The ADHD Executive Functions Workbook, by Raza Gibbons  o  Taking Charge of Adult ADHD, by Gt arroyo  [facility]: Adderall, Stimulants & Modafinil for ADHD: Short-& Long-Term Effects

## 2024-07-15 ENCOUNTER — TELEPHONE (OUTPATIENT)
Dept: FAMILY MEDICINE | Facility: CLINIC | Age: 29
End: 2024-07-15
Payer: COMMERCIAL

## 2024-07-15 NOTE — TELEPHONE ENCOUNTER
----- Message from YULIA ARZOLA sent at 7/15/2024 12:21 PM CDT -----  Regarding: Auth or same day spot  I need to see him within a week or two at the maximum. Please schedule him with me.  Thanks.  Yulia Arzola MD

## 2024-07-16 ENCOUNTER — TELEPHONE (OUTPATIENT)
Dept: FAMILY MEDICINE | Facility: CLINIC | Age: 29
End: 2024-07-16
Payer: COMMERCIAL

## 2024-07-24 ENCOUNTER — OFFICE VISIT (OUTPATIENT)
Dept: FAMILY MEDICINE | Facility: CLINIC | Age: 29
End: 2024-07-24
Payer: COMMERCIAL

## 2024-07-24 VITALS
BODY MASS INDEX: 35.58 KG/M2 | HEIGHT: 64 IN | RESPIRATION RATE: 21 BRPM | WEIGHT: 208.4 LBS | DIASTOLIC BLOOD PRESSURE: 74 MMHG | SYSTOLIC BLOOD PRESSURE: 118 MMHG | OXYGEN SATURATION: 97 % | HEART RATE: 98 BPM | TEMPERATURE: 99 F

## 2024-07-24 DIAGNOSIS — F90.0 ATTENTION DEFICIT HYPERACTIVITY DISORDER (ADHD), PREDOMINANTLY INATTENTIVE TYPE: ICD-10-CM

## 2024-07-24 DIAGNOSIS — R10.2 PELVIC PAIN: ICD-10-CM

## 2024-07-24 DIAGNOSIS — Z79.899 TRANSGENDER MAN ON HORMONE THERAPY: Primary | ICD-10-CM

## 2024-07-24 DIAGNOSIS — F64.0 TRANSGENDER MAN ON HORMONE THERAPY: Primary | ICD-10-CM

## 2024-07-24 PROCEDURE — 90471 IMMUNIZATION ADMIN: CPT | Performed by: FAMILY MEDICINE

## 2024-07-24 PROCEDURE — 99214 OFFICE O/P EST MOD 30 MIN: CPT | Mod: 25 | Performed by: FAMILY MEDICINE

## 2024-07-24 PROCEDURE — 90632 HEPA VACCINE ADULT IM: CPT | Performed by: FAMILY MEDICINE

## 2024-07-24 RX ORDER — LISDEXAMFETAMINE DIMESYLATE 20 MG/1
20 CAPSULE ORAL DAILY
Qty: 30 CAPSULE | Refills: 0 | Status: SHIPPED | OUTPATIENT
Start: 2024-09-22 | End: 2024-10-22

## 2024-07-24 RX ORDER — LISDEXAMFETAMINE DIMESYLATE 20 MG/1
20 CAPSULE ORAL DAILY
Qty: 30 CAPSULE | Refills: 0 | Status: SHIPPED | OUTPATIENT
Start: 2024-08-23 | End: 2024-09-22

## 2024-07-24 RX ORDER — MEDROXYPROGESTERONE ACETATE 10 MG
10 TABLET ORAL DAILY
Qty: 10 TABLET | Refills: 0 | Status: SHIPPED | OUTPATIENT
Start: 2024-07-24 | End: 2024-07-26

## 2024-07-24 RX ORDER — LISDEXAMFETAMINE DIMESYLATE 20 MG/1
20 CAPSULE ORAL DAILY
Qty: 30 CAPSULE | Refills: 0 | Status: SHIPPED | OUTPATIENT
Start: 2024-07-24 | End: 2024-08-23

## 2024-07-24 RX ORDER — ESTRADIOL 10 UG/1
10 INSERT VAGINAL
Qty: 7 TABLET | Refills: 0 | Status: SHIPPED | OUTPATIENT
Start: 2024-07-25 | End: 2024-07-26

## 2024-07-24 NOTE — PROGRESS NOTES
"Assessment & Plan   Pelvic pain  He is not sexually active.   We reviewed the [facility] [place] care recommendations pelvic pain.  - estradiol (VAGIFEM) 10 MCG TABS vaginal tablet  Dispense: 7 tablet; Refill: 0  This will be used to prepare him for the ultrasound.   - US Pelvic Complete with Transvaginal  - medroxyPROGESTERone (PROVERA) 10 MG tablet  Dispense: 10 tablet; Refill: 0    Transgender man on hormone therapy  His Implanon is at the end of recommended time. He is not interested in an IUD.   - estradiol (VAGIFEM) 10 MCG TABS vaginal tablet  Dispense: 7 tablet; Refill: 0    Attention deficit hyperactivity disorder (ADHD), predominantly inattentive type  He changed from Adderall to Vyvanse, but did not feel the 10 mg worked as welll for his ADHD as 10 mg Adderall did.  - lisdexamfetamine (VYVANSE) 20 MG capsule  Dispense: 30 capsule; Refill: 0  - lisdexamfetamine (VYVANSE) 20 MG capsule  Dispense: 30 capsule; Refill: 0  - lisdexamfetamine (VYVANSE) 20 MG capsule  Dispense: 30 capsule; Refill: 0    The longitudinal plan of care for the diagnosis(es)/condition(s) as documented were addressed during this visit. Due to the added complexity in care, I will continue to support Elder in the subsequent management and with ongoing continuity of care.    Will determine follow-up by tests results of the ultrasound. He notes he is going home for a month in several weeks.  BMI  Estimated body mass index is 35.77 kg/m  as calculated from the following:    Height as of this encounter: 1.626 m (5' 4\").    Weight as of this encounter: 94.5 kg (208 lb 6.4 oz).     Subjective   Elder is a 28 year old, presenting for the following health issues:  Contraception (Pt states he started to have menstrual symptoms starting last week despite having implant and currently taking testosterone. )      7/24/2024     1:15 PM   Additional Questions   Roomed by Antonino VALLADARES RN     Barely bleeding, just smears in the underwear every few days. However, 4 " "weeks ago, starting have his typical menstrual cramps.    History of Present Illness       Reason for visit:  Inquiring about a medical issue  Symptom onset:  3-4 weeks ago  Symptoms include:  Cramps, abdominal and lower back/leg pain, spotting  Symptom intensity:  Moderate  Symptom progression:  Staying the same  Had these symptoms before:  Yes  Has tried/received treatment for these symptoms:  Yes  Previous treatment was successful:  Yes  Prior treatment description:  Birth control, testosterone  What makes it worse:  Too much physical activity  What makes it better:  Painkillers    He eats 2-3 servings of fruits and vegetables daily.He consumes 1 sweetened beverage(s) daily.He exercises with enough effort to increase his heart rate 10 to 19 minutes per day.  He exercises with enough effort to increase his heart rate 3 or less days per week.   He is taking medications regularly.       Objective    /74 (BP Location: Left arm, Patient Position: Sitting, Cuff Size: Adult Regular)   Pulse 98   Temp 99  F (37.2  C) (Tympanic)   Resp 21   Ht 1.626 m (5' 4\")   Wt 94.5 kg (208 lb 6.4 oz)   SpO2 97%   BMI 35.77 kg/m    Body mass index is 35.77 kg/m .  Physical Exam   GENERAL: alert and no distress  EYES: Eyes grossly normal to inspection, PERRL and conjunctivae and sclerae normal  ABDOMEN: soft, nontender, no hepatosplenomegaly, no masses and bowel sounds normal  MS: no gross musculoskeletal defects noted, no edema. No hernia  SKIN: no suspicious lesions or rashes  NEURO: Normal strength and tone, mentation intact and speech normal  PSYCH: mentation appears normal, affect normal/bright    Lab on 06/21/2024   Component Date Value Ref Range Status    Testosterone Total 06/21/2024 441  8 - 950 ng/dL Final    Hemoglobin 06/21/2024 13.8  11.7 - 17.7 g/dL Final    Reference Range:                                                     Female 11.7-15.7 g/dL                                      Male 13.3-17.7 g/dL    " Hematocrit 06/21/2024 43.7  35.0 - 53.0 % Final    Sex Specific Reference Ranges: Female  35.0-47.0 % Male  40.0-53.0 %    Albumin 06/21/2024 4.4  3.5 - 5.2 g/dL Final    Sex Hormone Binding Globulin 06/21/2024 14  11 - 135 nmol/L Final    Female Reference Range:  Manish Stage I:  nmol/L  Manish Stage II:  nmol/L  Manish Stage III: 12-98 nmol/L  Manish Stage IV:  nmol/L  Manish Stage V:  nmol/L    Male Reference Range:  Manish Stage I:  nmol/L  Manish Stage II:  nmol/L  Manish Stage III:  nmol/L  Manish Stage IV: 11-60 nmol/L  Manish Stage V: 11-71 nmol/L    Cholesterol 06/21/2024 143  <200 mg/dL Final    Triglycerides 06/21/2024 72  <150 mg/dL Final    Direct Measure HDL 06/21/2024 37 (L)  >=40 mg/dL Final    LDL Cholesterol Calculated 06/21/2024 92  <=100 mg/dL Final    Non HDL Cholesterol 06/21/2024 106  <130 mg/dL Final    Patient Fasting > 8hrs? 06/21/2024 Unknown   Final   Signed Electronically by: YULIA LAO MD

## 2024-07-26 ENCOUNTER — MYC REFILL (OUTPATIENT)
Dept: FAMILY MEDICINE | Facility: CLINIC | Age: 29
End: 2024-07-26
Payer: COMMERCIAL

## 2024-07-26 DIAGNOSIS — F90.0 ATTENTION DEFICIT HYPERACTIVITY DISORDER (ADHD), PREDOMINANTLY INATTENTIVE TYPE: ICD-10-CM

## 2024-07-26 DIAGNOSIS — R10.2 PELVIC PAIN: ICD-10-CM

## 2024-07-26 DIAGNOSIS — Z79.899 TRANSGENDER MAN ON HORMONE THERAPY: ICD-10-CM

## 2024-07-26 DIAGNOSIS — F64.0 TRANSGENDER MAN ON HORMONE THERAPY: ICD-10-CM

## 2024-07-26 RX ORDER — ESTRADIOL 10 UG/1
10 INSERT VAGINAL
Qty: 7 TABLET | Refills: 0 | Status: SHIPPED | OUTPATIENT
Start: 2024-07-29

## 2024-07-26 RX ORDER — LISDEXAMFETAMINE DIMESYLATE 20 MG/1
20 CAPSULE ORAL DAILY
Qty: 30 CAPSULE | Refills: 0 | OUTPATIENT
Start: 2024-07-26

## 2024-07-26 RX ORDER — MEDROXYPROGESTERONE ACETATE 10 MG
10 TABLET ORAL DAILY
Qty: 10 TABLET | Refills: 0 | Status: SHIPPED | OUTPATIENT
Start: 2024-07-26

## 2024-08-07 ENCOUNTER — ANCILLARY PROCEDURE (OUTPATIENT)
Dept: ULTRASOUND IMAGING | Facility: CLINIC | Age: 29
End: 2024-08-07
Payer: COMMERCIAL

## 2024-08-07 DIAGNOSIS — R10.2 PELVIC PAIN: ICD-10-CM

## 2024-08-07 PROCEDURE — 76856 US EXAM PELVIC COMPLETE: CPT | Mod: GC | Performed by: RADIOLOGY

## 2024-08-07 PROCEDURE — 76830 TRANSVAGINAL US NON-OB: CPT | Mod: GC | Performed by: RADIOLOGY

## 2024-09-05 NOTE — TELEPHONE ENCOUNTER
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  Activities to promote healthy lifestyle- continue daily walks, lessen sweets.  Practice coping skills activities from handout provided.   LM for patient in regards to rescheduling appointment with Dr. Atwood 9/15 to either another day or mealier that day due to provider schedule.    Call back number was provided.

## 2024-09-20 ENCOUNTER — LAB (OUTPATIENT)
Dept: LAB | Facility: CLINIC | Age: 29
End: 2024-09-20
Payer: COMMERCIAL

## 2024-09-20 DIAGNOSIS — Z79.899 TRANSGENDER MAN ON HORMONE THERAPY: Primary | ICD-10-CM

## 2024-09-20 DIAGNOSIS — F64.0 TRANSGENDER MAN ON HORMONE THERAPY: Primary | ICD-10-CM

## 2024-09-20 LAB
ALBUMIN SERPL BCG-MCNC: 4.2 G/DL (ref 3.5–5.2)
CHOLEST SERPL-MCNC: 131 MG/DL
FASTING STATUS PATIENT QL REPORTED: YES
HCT VFR BLD AUTO: 44.3 % (ref 35–53)
HDLC SERPL-MCNC: 38 MG/DL
HGB BLD-MCNC: 13.9 G/DL (ref 11.7–17.7)
LDLC SERPL CALC-MCNC: 77 MG/DL
NONHDLC SERPL-MCNC: 93 MG/DL
SHBG SERPL-SCNC: 18 NMOL/L (ref 11–135)
TRIGL SERPL-MCNC: 79 MG/DL

## 2024-09-20 PROCEDURE — 84403 ASSAY OF TOTAL TESTOSTERONE: CPT

## 2024-09-20 PROCEDURE — 80061 LIPID PANEL: CPT

## 2024-09-20 PROCEDURE — 85018 HEMOGLOBIN: CPT

## 2024-09-20 PROCEDURE — 36415 COLL VENOUS BLD VENIPUNCTURE: CPT

## 2024-09-20 PROCEDURE — 85014 HEMATOCRIT: CPT

## 2024-09-20 PROCEDURE — 84270 ASSAY OF SEX HORMONE GLOBUL: CPT

## 2024-09-20 PROCEDURE — 82040 ASSAY OF SERUM ALBUMIN: CPT

## 2024-09-24 LAB — TESTOST SERPL-MCNC: 120 NG/DL (ref 8–950)

## 2024-09-26 ASSESSMENT — PATIENT HEALTH QUESTIONNAIRE - PHQ9
10. IF YOU CHECKED OFF ANY PROBLEMS, HOW DIFFICULT HAVE THESE PROBLEMS MADE IT FOR YOU TO DO YOUR WORK, TAKE CARE OF THINGS AT HOME, OR GET ALONG WITH OTHER PEOPLE: SOMEWHAT DIFFICULT
SUM OF ALL RESPONSES TO PHQ QUESTIONS 1-9: 1
SUM OF ALL RESPONSES TO PHQ QUESTIONS 1-9: 1

## 2024-09-27 ENCOUNTER — TELEPHONE (OUTPATIENT)
Dept: FAMILY MEDICINE | Facility: CLINIC | Age: 29
End: 2024-09-27

## 2024-09-27 ENCOUNTER — VIRTUAL VISIT (OUTPATIENT)
Dept: FAMILY MEDICINE | Facility: CLINIC | Age: 29
End: 2024-09-27
Payer: COMMERCIAL

## 2024-09-27 DIAGNOSIS — R10.2 PELVIC PAIN: ICD-10-CM

## 2024-09-27 DIAGNOSIS — F64.0 TRANSGENDER MAN ON HORMONE THERAPY: ICD-10-CM

## 2024-09-27 DIAGNOSIS — Z79.899 TRANSGENDER MAN ON HORMONE THERAPY: ICD-10-CM

## 2024-09-27 DIAGNOSIS — F90.0 ATTENTION DEFICIT HYPERACTIVITY DISORDER (ADHD), PREDOMINANTLY INATTENTIVE TYPE: Primary | ICD-10-CM

## 2024-09-27 PROCEDURE — 99443 PR PHYSICIAN TELEPHONE EVALUATION 21-30 MIN: CPT | Mod: 93 | Performed by: FAMILY MEDICINE

## 2024-09-27 RX ORDER — LISDEXAMFETAMINE DIMESYLATE 20 MG/1
20 CAPSULE ORAL DAILY
Qty: 30 CAPSULE | Refills: 0 | Status: CANCELLED | OUTPATIENT
Start: 2024-09-27

## 2024-09-27 RX ORDER — LISDEXAMFETAMINE DIMESYLATE 30 MG/1
30 CAPSULE ORAL DAILY
Qty: 30 CAPSULE | Refills: 0 | Status: SHIPPED | OUTPATIENT
Start: 2024-09-27 | End: 2024-10-27

## 2024-09-27 RX ORDER — LISDEXAMFETAMINE DIMESYLATE 30 MG/1
30 CAPSULE ORAL DAILY
Qty: 30 CAPSULE | Refills: 0 | Status: SHIPPED | OUTPATIENT
Start: 2024-11-26 | End: 2024-12-26

## 2024-09-27 RX ORDER — TESTOSTERONE CYPIONATE 200 MG/ML
0.7 INJECTION, SOLUTION INTRAMUSCULAR WEEKLY
Qty: 6 ML | Refills: 2 | Status: SHIPPED | OUTPATIENT
Start: 2024-09-27 | End: 2024-10-01

## 2024-09-27 RX ORDER — LISDEXAMFETAMINE DIMESYLATE 30 MG/1
30 CAPSULE ORAL DAILY
Qty: 30 CAPSULE | Refills: 0 | Status: SHIPPED | OUTPATIENT
Start: 2024-10-27 | End: 2024-11-26

## 2024-09-27 NOTE — TELEPHONE ENCOUNTER
testosterone cypionate (DEPOTESTOSTERONE) 200 MG/ML injection 6 mL 2 9/27/2024 -- No   Sig - Route: Inject 0.7 mLs (140 mg) subcutaneously once a week. Inject 0.5 mLs (100mg) into the muscle once a week. - Subcutaneous     Pharmacy needing to know which directions to follow as there is 2 directions in SIG

## 2024-09-27 NOTE — PROGRESS NOTES
"Elder is a 29 year old who is being evaluated via a billable telephone visit.    What phone number would you like to be contacted at? [phone]   How would you like to obtain your AVS? [place]  Originating Location (pt. Location): Home    Distant Location (provider location):  On-site    Assessment & Plan     Attention deficit hyperactivity disorder (ADHD), predominantly inattentive type  Warned the nausea may make the increased does intolerable. He feels eating before will prevent this.  - lisdexamfetamine (VYVANSE) 30 MG capsule  Dispense: 30 capsule; Refill: 0  - lisdexamfetamine (VYVANSE) 30 MG capsule  Dispense: 30 capsule; Refill: 0  - lisdexamfetamine (VYVANSE) 30 MG capsule  Dispense: 30 capsule; Refill: 0    Transgender man on hormone therapy  Will continue the testosterone at the same level dose, though it is lower than the typical goal because the pelvic pain is better at this level.  - Ob/Gyn  Referral  - testosterone cypionate (DEPOTESTOSTERONE) 200 MG/ML injection  Dispense: 6 mL; Refill: 2  - Testosterone, total    Pelvic pain  Improved on a lower testosterone dose. But it is lower than the typical goal, actually hypogonadal for a man.  - Ob/Gyn  Referral    Labs and recheck in 3 months.    BMI  Estimated body mass index is 35.77 kg/m  as calculated from the following:    Height as of 7/24/24: 1.626 m (5' 4\").    Weight as of 7/24/24: 94.5 kg (208 lb 6.4 oz).     Subjective   Elder is a 29 year old, presenting for the following health issues:  A.D.H.D (Medication follow up; pt reports he is tolerating Vyanse without any side effects )      9/27/2024     9:06 AM   Additional Questions   Roomed by Melvi,EDDIE   Accompanied by alone     Doesn't feel as strong regarding concentration. Mild nausea if he doesn't eat breakfast.  Pelvic pain has improved with a lower dose of testosterone. Occasionally gets a little bit of pressure.  Interested in a consult for hysterectomy.     A.D.H.D      "   Objective       Vitals:  No vitals were obtained today due to virtual visit.    Physical Exam   General: Alert and no distress //Respiratory: No audible wheeze, cough, or shortness of breath // Psychiatric:  Appropriate affect, tone, and pace of words    Lab on 09/20/2024   Component Date Value Ref Range Status    Testosterone Total 09/20/2024 120  8 - 950 ng/dL Final    Hemoglobin 09/20/2024 13.9  11.7 - 17.7 g/dL Final    Reference Range:                                                     Female 11.7-15.7 g/dL                                      Male 13.3-17.7 g/dL    Hematocrit 09/20/2024 44.3  35.0 - 53.0 % Final    Sex Specific Reference Ranges: Female  35.0-47.0 % Male  40.0-53.0 %    Albumin 09/20/2024 4.2  3.5 - 5.2 g/dL Final    Sex Hormone Binding Globulin 09/20/2024 18  11 - 135 nmol/L Final    Female Reference Range:  Manish Stage I:  nmol/L  Manish Stage II:  nmol/L  Manish Stage III: 12-98 nmol/L  Manish Stage IV:  nmol/L  Manish Stage V:  nmol/L    Male Reference Range:  Manish Stage I:  nmol/L  Manish Stage II:  nmol/L  Manish Stage III:  nmol/L  Manish Stage IV: 11-60 nmol/L  Manish Stage V: 11-71 nmol/L    Cholesterol 09/20/2024 131  <200 mg/dL Final    Triglycerides 09/20/2024 79  <150 mg/dL Final    Direct Measure HDL 09/20/2024 38 (L)  >=40 mg/dL Final    LDL Cholesterol Calculated 09/20/2024 77  <100 mg/dL Final    Non HDL Cholesterol 09/20/2024 93  <130 mg/dL Final    Patient Fasting > 8hrs? 09/20/2024 Yes   Final   Phone call duration: 31 minutes  Signed Electronically by: YULIA LAO MD

## 2024-10-01 RX ORDER — TESTOSTERONE CYPIONATE 200 MG/ML
0.5 INJECTION, SOLUTION INTRAMUSCULAR WEEKLY
Qty: 6 ML | Refills: 2 | Status: SHIPPED | OUTPATIENT
Start: 2024-10-01

## 2024-10-08 ENCOUNTER — MYC MEDICAL ADVICE (OUTPATIENT)
Dept: FAMILY MEDICINE | Facility: CLINIC | Age: 29
End: 2024-10-08
Payer: COMMERCIAL

## 2024-10-08 DIAGNOSIS — F64.0 TRANSGENDER MAN ON HORMONE THERAPY: Primary | ICD-10-CM

## 2024-10-08 DIAGNOSIS — Z79.899 TRANSGENDER MAN ON HORMONE THERAPY: Primary | ICD-10-CM

## 2024-10-10 DIAGNOSIS — F64.9 GENDER DYSPHORIA: Primary | ICD-10-CM

## 2024-10-10 DIAGNOSIS — Z79.899 TRANSGENDER MAN ON HORMONE THERAPY: ICD-10-CM

## 2024-10-10 DIAGNOSIS — F64.0 TRANSGENDER MAN ON HORMONE THERAPY: ICD-10-CM

## 2024-10-16 RX ORDER — TESTOSTERONE CYPIONATE 200 MG/ML
0.5 INJECTION, SOLUTION INTRAMUSCULAR WEEKLY
Qty: 6 ML | Refills: 2 | Status: SHIPPED | OUTPATIENT
Start: 2024-10-16

## 2024-10-17 ASSESSMENT — ANXIETY QUESTIONNAIRES
GAD7 TOTAL SCORE: 2
1. FEELING NERVOUS, ANXIOUS, OR ON EDGE: NOT AT ALL
6. BECOMING EASILY ANNOYED OR IRRITABLE: SEVERAL DAYS
5. BEING SO RESTLESS THAT IT IS HARD TO SIT STILL: NOT AT ALL
IF YOU CHECKED OFF ANY PROBLEMS ON THIS QUESTIONNAIRE, HOW DIFFICULT HAVE THESE PROBLEMS MADE IT FOR YOU TO DO YOUR WORK, TAKE CARE OF THINGS AT HOME, OR GET ALONG WITH OTHER PEOPLE: SOMEWHAT DIFFICULT
8. IF YOU CHECKED OFF ANY PROBLEMS, HOW DIFFICULT HAVE THESE MADE IT FOR YOU TO DO YOUR WORK, TAKE CARE OF THINGS AT HOME, OR GET ALONG WITH OTHER PEOPLE?: SOMEWHAT DIFFICULT
4. TROUBLE RELAXING: NOT AT ALL
7. FEELING AFRAID AS IF SOMETHING AWFUL MIGHT HAPPEN: NOT AT ALL
2. NOT BEING ABLE TO STOP OR CONTROL WORRYING: NOT AT ALL
GAD7 TOTAL SCORE: 2
3. WORRYING TOO MUCH ABOUT DIFFERENT THINGS: SEVERAL DAYS
7. FEELING AFRAID AS IF SOMETHING AWFUL MIGHT HAPPEN: NOT AT ALL
GAD7 TOTAL SCORE: 2

## 2024-10-18 ENCOUNTER — OFFICE VISIT (OUTPATIENT)
Dept: OBGYN | Facility: CLINIC | Age: 29
End: 2024-10-18
Attending: OBSTETRICS & GYNECOLOGY
Payer: COMMERCIAL

## 2024-10-18 ENCOUNTER — PREP FOR PROCEDURE (OUTPATIENT)
Dept: OBGYN | Facility: CLINIC | Age: 29
End: 2024-10-18
Payer: COMMERCIAL

## 2024-10-18 VITALS
HEIGHT: 64 IN | WEIGHT: 203.7 LBS | SYSTOLIC BLOOD PRESSURE: 107 MMHG | HEART RATE: 82 BPM | DIASTOLIC BLOOD PRESSURE: 75 MMHG | BODY MASS INDEX: 34.78 KG/M2

## 2024-10-18 DIAGNOSIS — Z41.8 ENCOUNTER FOR GENDER AFFIRMATION PROCEDURE: Primary | ICD-10-CM

## 2024-10-18 DIAGNOSIS — F64.0 TRANSGENDER MAN ON HORMONE THERAPY: ICD-10-CM

## 2024-10-18 DIAGNOSIS — Z79.899 TRANSGENDER MAN ON HORMONE THERAPY: ICD-10-CM

## 2024-10-18 DIAGNOSIS — R10.2 PELVIC PAIN: ICD-10-CM

## 2024-10-18 PROCEDURE — 99203 OFFICE O/P NEW LOW 30 MIN: CPT | Mod: GE | Performed by: OBSTETRICS & GYNECOLOGY

## 2024-10-18 PROCEDURE — G0463 HOSPITAL OUTPT CLINIC VISIT: HCPCS

## 2024-10-18 RX ORDER — PHENAZOPYRIDINE HYDROCHLORIDE 100 MG/1
200 TABLET, FILM COATED ORAL ONCE
OUTPATIENT
Start: 2024-10-18 | End: 2024-10-18

## 2024-10-18 RX ORDER — ACETAMINOPHEN 325 MG/1
975 TABLET ORAL ONCE
OUTPATIENT
Start: 2024-10-18 | End: 2024-10-18

## 2024-10-18 RX ORDER — METRONIDAZOLE 500 MG/100ML
500 INJECTION, SOLUTION INTRAVENOUS
OUTPATIENT
Start: 2024-10-18

## 2024-10-18 NOTE — Clinical Note
10/18/2024       RE: Elva Aaron  [address]  [place] [address]     Dear Colleague,    Thank you for referring your patient, Elva Aaron, to the [facility] [place] [facility]. Please see a copy of my visit note below.    [facility]  Gynecology Visit    Reason for Visit: Hysterectomy consult     HPI:    Elder Aaron is a 29 year old here for hysterectomy consult. He has been on gender affirming hormone therapy for the better part of 2 years. Notes that hysterectomy has been something he has thought about for some time and has already discussed with his primary care physician. Desires bilateral salpingo-oophorectomy as well, previously been counseled on implications for bone and cardiovascular health.     Surgical history is notable for tonsillectomy/adenoidectomy and bilateral mastectomy (2021). He has never had abdominal or pelvic surgery. No history of adverse reactions to anesthesia. No known personal or family history of bleeding or clotting disorders.     OBHx  OB History    Para Term  AB Living   0 0 0 0 0 0   SAB IAB Ectopic Multiple Live Births   0 0 0 0 0     No past medical history on file.    Past Surgical History:   Procedure Laterality Date    ADENOIDECTOMY      COSMETIC PLACEMENT OF DENTAL IMPLANT(S)      HC TOOTH EXTRACTION W/FORCEP      MASTECTOMY SIMPLE BILATERAL Bilateral 2021    Procedure: MASTECTOMY, BILATERAL, SIMPLE, WITH nipple grafts. OnQ;  Surgeon: Lashanda Mulligan MD;  Location: [facility] OR    TONSILLECTOMY         Current Outpatient Medications:     acetaminophen (TYLENOL) 325 MG tablet, Take 325-650 mg by mouth every 6 hours as needed for mild pain, Disp: , Rfl:     estradiol (VAGIFEM) 10 MCG TABS vaginal tablet, Place 1 tablet (10 mcg) vaginally twice a week, Disp: 7 tablet, Rfl: 0    fluticasone (FLONASE) 50 MCG/ACT nasal spray, Spray 1 spray into both nostrils daily  "(Patient taking differently: Spray 1 spray into both nostrils as needed.), Disp: 9.9 mL, Rfl: 0    ibuprofen (ADVIL/MOTRIN) 200 MG capsule, Take 200 mg by mouth every 4 hours as needed for fever , Disp: , Rfl:     lisdexamfetamine (VYVANSE) 20 MG capsule, Take 1 capsule (20 mg) by mouth daily for 30 days, Disp: 30 capsule, Rfl: 0    lisdexamfetamine (VYVANSE) 30 MG capsule, Take 1 capsule (30 mg) by mouth daily., Disp: 30 capsule, Rfl: 0    [START ON 10/27/2024] lisdexamfetamine (VYVANSE) 30 MG capsule, Take 1 capsule (30 mg) by mouth daily., Disp: 30 capsule, Rfl: 0    [START ON 11/26/2024] lisdexamfetamine (VYVANSE) 30 MG capsule, Take 1 capsule (30 mg) by mouth daily., Disp: 30 capsule, Rfl: 0    loratadine (CLARITIN) 10 MG tablet, Take 1 tablet (10 mg) by mouth daily, Disp: 30 tablet, Rfl: 0    medroxyPROGESTERone (PROVERA) 10 MG tablet, Take 1 tablet (10 mg) by mouth daily, Disp: 10 tablet, Rfl: 0    SUMAtriptan (IMITREX) 25 MG tablet, Take 25 mg by mouth at onset of headache for migraine, Disp: , Rfl:     syringe/needle, sisp, (BD LUER-CLAYTON SYRINGE) 25G X 5/8\" 1 ML MISC, Inject 0.4 mLs Subcutaneous once a week, Disp: 10 each, Rfl: 5    testosterone cypionate (DEPOTESTOSTERONE) 200 MG/ML injection, Inject 0.5 mLs (100 mg) subcutaneously once a week., Disp: 6 mL, Rfl: 2    No Known Allergies    Family History   Problem Relation Age of Onset    Thyroid Disease Mother     Impaired Fasting Glucose Father     No Known Problems Brother     Clotting Disorder Maternal Grandmother     Lung Cancer Maternal Grandmother     Cancer Maternal Grandfather         Colon Ca    Chronic Obstructive Pulmonary Disease Maternal Grandfather     Chronic Obstructive Pulmonary Disease Paternal Grandfather        Social History     Socioeconomic History    Marital status: Single     Spouse name: Not on file    Number of children: Not on file    Years of education: Not on file    Highest education level: Not on file   Occupational History "    Not on file   Tobacco Use    Smoking status: Never    Smokeless tobacco: Never   Vaping Use    Vaping status: Never Used   Substance and Sexual Activity    Alcohol use: Yes     Comment: Never more than one, and goes weeks without.    Drug use: Yes     Frequency: 1.0 times per week     Types: Marijuana    Sexual activity: Yes     Partners: Male     Comment: polyamorous   Other Topics Concern    Not on file   Social History Narrative    Not on file     Social Determinants of Health     Financial Resource Strain: Low Risk  (1/17/2024)    Financial Resource Strain     Within the past 12 months, have you or your family members you live with been unable to get utilities (heat, electricity) when it was really needed?: No   Food Insecurity: High Risk (1/17/2024)    Food Insecurity     Within the past 12 months, did you worry that your food would run out before you got money to buy more?: Yes     Within the past 12 months, did the food you bought just not last and you didn t have money to get more?: Yes   Transportation Needs: High Risk (1/17/2024)    Transportation Needs     Within the past 12 months, has lack of transportation kept you from medical appointments, getting your medicines, non-medical meetings or appointments, work, or from getting things that you need?: Yes   Physical Activity: Not on file   Stress: Not on file   Social Connections: Not on file   Interpersonal Safety: Low Risk  (7/24/2024)    Interpersonal Safety     Do you feel physically and emotionally safe where you currently live?: Yes     Within the past 12 months, have you been hit, slapped, kicked or otherwise physically hurt by someone?: No     Within the past 12 months, have you been humiliated or emotionally abused in other ways by your partner or ex-partner?: No   Housing Stability: Low Risk  (1/17/2024)    Housing Stability     Do you have housing? : Yes     Are you worried about losing your housing?: No       ROS: 10-Point ROS negative except  as noted in HPI    Physical Exam  LMP  (LMP Unknown)   Gen: Well-appearing, NAD  HEENT: Normocephalic, atraumatic  Neck: Thyroid is not enlarged, no appreciable masses palpated. Non-tender  CV:  Regula rate and rhythm  Pulm: CTAB, no w/r/r auscultated  Abd: Soft, non-tender, non-distended    Pelvic:  Normal appearing external genitalia. Uterus is small, mobile, non-tender, no adnexal tenderness or masses on bimanual       Assessment/Plan:  Elder Aaron is a 29 year old here for consultation for gender affirming hysterectomy.     # Hysterectomy consult   -We discussed the process for minimally invasive laparoscopic hysterectomy and bilateral salpingectomy with or without oophorectomy.  I reviewed that this is an irreversible procedure and removes the possibility of carrying a pregnancy.  We discussed the option of egg retrieval prior to removal of bilateral ovaries, oophorectomy without prior egg retrieval and retention of bilateral ovaries.  Long-term data on ovarian retention versus removal when on testosterone treatment is still unclear.  He would like to pursue bilateral salpingo-oophorectomy with hysterectomy without prior oocyte retrieval.  He was informed that he can obtain a consultation with reproductive endocrinologist at any point prior to surgery.  Hysterectomy and surgical consents were signed today.    - Risks of surgery including risks of bleeding, infection, injury to other organs, conversion to laparotomy, need for hospitalization following surgery, regret, and risks of anesthesia were discussed.  Plan for Ancef and Flagyl for preoperative antibiotics.  Recovery of 6 to 8 weeks with lifting restrictions of 20 pounds was discussed.  He will also need to have somebody stay with him overnight the day of surgery.  -Reports a normal Pap smear within the last 3 years, records not available but had one earlier this year with PCP and reports was normal. He has not had an endometrial biopsy.  He does not have  a history of abnormal uterine bleeding.  We discussed the small risk that an unidentified precancerous or cancerous lesion would be found on final pathology.  This may result in needing additional surgical procedures or treatment.    -We will plan for a postoperative visit at 2 and 6 to 8 weeks  - Scheduling request placed for laparoscopic hysterectomy and bilateral salpingo-oophorectomy    Staffed with Dr. Contreras Cline MD   Obstetrics & Gynecology, PGY-2  10/18/2024 1:09 PM      The Patient was seen in [facility]uity [facility] by LUIS CLINE.  I reviewed the history & exam. Assessment and plan were jointly made.    Jane Chairez MD        Again, thank you for allowing me to participate in the care of your patient.      Sincerely,    Luis Cline MD

## 2024-10-18 NOTE — PROGRESS NOTES
[facility]  Gynecology Visit    Reason for Visit: Hysterectomy consult     HPI:    Elder Aaron is a 29 year old here for hysterectomy consult. He has been on gender affirming hormone therapy for the better part of 2 years. Notes that hysterectomy has been something he has thought about for some time and has already discussed with his primary care physician. Desires bilateral salpingo-oophorectomy as well, previously been counseled on implications for bone and cardiovascular health.     Surgical history is notable for tonsillectomy/adenoidectomy and bilateral mastectomy (2021). He has never had abdominal or pelvic surgery. No history of adverse reactions to anesthesia. No known personal or family history of bleeding or clotting disorders.     OBHx  OB History    Para Term  AB Living   0 0 0 0 0 0   SAB IAB Ectopic Multiple Live Births   0 0 0 0 0     No past medical history on file.    Past Surgical History:   Procedure Laterality Date    ADENOIDECTOMY      COSMETIC PLACEMENT OF DENTAL IMPLANT(S)      HC TOOTH EXTRACTION W/FORCEP      MASTECTOMY SIMPLE BILATERAL Bilateral 2021    Procedure: MASTECTOMY, BILATERAL, SIMPLE, WITH nipple grafts. OnQ;  Surgeon: Lashanda Mulligan MD;  Location: [facility] [place] OR    TONSILLECTOMY         Current Outpatient Medications:     acetaminophen (TYLENOL) 325 MG tablet, Take 325-650 mg by mouth every 6 hours as needed for mild pain, Disp: , Rfl:     estradiol (VAGIFEM) 10 MCG TABS vaginal tablet, Place 1 tablet (10 mcg) vaginally twice a week, Disp: 7 tablet, Rfl: 0    fluticasone (FLONASE) 50 MCG/ACT nasal spray, Spray 1 spray into both nostrils daily (Patient taking differently: Spray 1 spray into both nostrils as needed.), Disp: 9.9 mL, Rfl: 0    ibuprofen (ADVIL/MOTRIN) 200 MG capsule, Take 200 mg by mouth every 4 hours as needed for fever , Disp: , Rfl:     lisdexamfetamine (VYVANSE) 20 MG capsule, Take 1 capsule (20 mg) by mouth daily for 30 days, Disp: 30  "capsule, Rfl: 0    lisdexamfetamine (VYVANSE) 30 MG capsule, Take 1 capsule (30 mg) by mouth daily., Disp: 30 capsule, Rfl: 0    [START ON 10/27/2024] lisdexamfetamine (VYVANSE) 30 MG capsule, Take 1 capsule (30 mg) by mouth daily., Disp: 30 capsule, Rfl: 0    [START ON 11/26/2024] lisdexamfetamine (VYVANSE) 30 MG capsule, Take 1 capsule (30 mg) by mouth daily., Disp: 30 capsule, Rfl: 0    loratadine (CLARITIN) 10 MG tablet, Take 1 tablet (10 mg) by mouth daily, Disp: 30 tablet, Rfl: 0    medroxyPROGESTERone (PROVERA) 10 MG tablet, Take 1 tablet (10 mg) by mouth daily, Disp: 10 tablet, Rfl: 0    SUMAtriptan (IMITREX) 25 MG tablet, Take 25 mg by mouth at onset of headache for migraine, Disp: , Rfl:     syringe/needle, sisp, (BD LUER-CLAYTON SYRINGE) 25G X 5/8\" 1 ML MISC, Inject 0.4 mLs Subcutaneous once a week, Disp: 10 each, Rfl: 5    testosterone cypionate (DEPOTESTOSTERONE) 200 MG/ML injection, Inject 0.5 mLs (100 mg) subcutaneously once a week., Disp: 6 mL, Rfl: 2    No Known Allergies    Family History   Problem Relation Age of Onset    Thyroid Disease Mother     Impaired Fasting Glucose Father     No Known Problems Brother     Clotting Disorder Maternal Grandmother     Lung Cancer Maternal Grandmother     Cancer Maternal Grandfather         Colon Ca    Chronic Obstructive Pulmonary Disease Maternal Grandfather     Chronic Obstructive Pulmonary Disease Paternal Grandfather        Social History     Socioeconomic History    Marital status: Single     Spouse name: Not on file    Number of children: Not on file    Years of education: Not on file    Highest education level: Not on file   Occupational History    Not on file   Tobacco Use    Smoking status: Never    Smokeless tobacco: Never   Vaping Use    Vaping status: Never Used   Substance and Sexual Activity    Alcohol use: Yes     Comment: Never more than one, and goes weeks without.    Drug use: Yes     Frequency: 1.0 times per week     Types: Marijuana    Sexual " activity: Yes     Partners: Male     Comment: polyamorous   Other Topics Concern    Not on file   Social History Narrative    Not on file     Social Determinants of Health     Financial Resource Strain: Low Risk  (1/17/2024)    Financial Resource Strain     Within the past 12 months, have you or your family members you live with been unable to get utilities (heat, electricity) when it was really needed?: No   Food Insecurity: High Risk (1/17/2024)    Food Insecurity     Within the past 12 months, did you worry that your food would run out before you got money to buy more?: Yes     Within the past 12 months, did the food you bought just not last and you didn t have money to get more?: Yes   Transportation Needs: High Risk (1/17/2024)    Transportation Needs     Within the past 12 months, has lack of transportation kept you from medical appointments, getting your medicines, non-medical meetings or appointments, work, or from getting things that you need?: Yes   Physical Activity: Not on file   Stress: Not on file   Social Connections: Not on file   Interpersonal Safety: Low Risk  (7/24/2024)    Interpersonal Safety     Do you feel physically and emotionally safe where you currently live?: Yes     Within the past 12 months, have you been hit, slapped, kicked or otherwise physically hurt by someone?: No     Within the past 12 months, have you been humiliated or emotionally abused in other ways by your partner or ex-partner?: No   Housing Stability: Low Risk  (1/17/2024)    Housing Stability     Do you have housing? : Yes     Are you worried about losing your housing?: No       ROS: 10-Point ROS negative except as noted in HPI    Physical Exam  LMP  (LMP Unknown)   Gen: Well-appearing, NAD  HEENT: Normocephalic, atraumatic  Neck: Thyroid is not enlarged, no appreciable masses palpated. Non-tender  CV:  Regula rate and rhythm  Pulm: CTAB, no w/r/r auscultated  Abd: Soft, non-tender, non-distended    Pelvic:  Normal  appearing external genitalia. Uterus is small, mobile, non-tender, no adnexal tenderness or masses on bimanual       Assessment/Plan:  Elder Aaron is a 29 year old here for consultation for gender affirming hysterectomy.     # Hysterectomy consult   -We discussed the process for minimally invasive laparoscopic hysterectomy and bilateral salpingectomy with or without oophorectomy.  I reviewed that this is an irreversible procedure and removes the possibility of carrying a pregnancy.  We discussed the option of egg retrieval prior to removal of bilateral ovaries, oophorectomy without prior egg retrieval and retention of bilateral ovaries.  Long-term data on ovarian retention versus removal when on testosterone treatment is still unclear.  He would like to pursue bilateral salpingo-oophorectomy with hysterectomy without prior oocyte retrieval.  He was informed that he can obtain a consultation with reproductive endocrinologist at any point prior to surgery.  Hysterectomy and surgical consents were signed today.    - Risks of surgery including risks of bleeding, infection, injury to other organs, conversion to laparotomy, need for hospitalization following surgery, regret, and risks of anesthesia were discussed.  Plan for Ancef and Flagyl for preoperative antibiotics.  Recovery of 6 to 8 weeks with lifting restrictions of 20 pounds was discussed.  He will also need to have somebody stay with him overnight the day of surgery.  -Reports a normal Pap smear within the last 3 years, records not available but had one earlier this year with PCP and reports was normal. He has not had an endometrial biopsy.  He does not have a history of abnormal uterine bleeding.  We discussed the small risk that an unidentified precancerous or cancerous lesion would be found on final pathology.  This may result in needing additional surgical procedures or treatment.    -We will plan for a postoperative visit at 2 and 6 to 8 weeks  - Scheduling  request placed for laparoscopic hysterectomy and bilateral salpingo-oophorectomy    Staffed with Dr. Contreras Cline MD   Obstetrics & Gynecology, PGY-2  10/18/2024 1:09 PM      The Patient was seen in [facility] Continuity Clinic by KAITLIN CLINE.  I reviewed the history & exam. Assessment and plan were jointly made.    Jane Chairez MD

## 2024-10-23 ENCOUNTER — TELEPHONE (OUTPATIENT)
Dept: OBGYN | Facility: CLINIC | Age: 29
End: 2024-10-23
Payer: COMMERCIAL

## 2024-10-28 DIAGNOSIS — F90.0 ATTENTION DEFICIT HYPERACTIVITY DISORDER (ADHD), PREDOMINANTLY INATTENTIVE TYPE: ICD-10-CM

## 2024-10-28 RX ORDER — LISDEXAMFETAMINE DIMESYLATE 30 MG/1
30 CAPSULE ORAL DAILY
Qty: 30 CAPSULE | Refills: 0 | Status: SHIPPED | OUTPATIENT
Start: 2024-10-28

## 2024-10-28 RX ORDER — LISDEXAMFETAMINE DIMESYLATE 30 MG/1
30 CAPSULE ORAL DAILY
Qty: 30 CAPSULE | Refills: 0 | Status: SHIPPED | OUTPATIENT
Start: 2024-11-26

## 2024-11-25 NOTE — PATIENT INSTRUCTIONS
Bilateral Mastectomy   Pre and Post op Surgery Instructions    You are scheduled for top surgery with/without nipple grafts on 12/17/21 at 7:15 AM    You will need to arrive at 5:15 [place] [address]. You can park in the Green [address] and check in on 3rd floor. (See attached map).     - Please make sure you have someone to drive you home after your surgery and you will need someone to stay with you at home 24 hours after your surgery.    The surgery will be about 3-4 hours long. You will be in recovery afterwards for about 1-2 hours.     Before Surgery:  - 8 hours prior to surgery stop eating solid food. You can continue to drink clear liquids (water, apple juice, Gatorade) up to 2 hours before surgery. If you have any medications that need to be taken in this timeframe, you can take them with a small sip of water    - No Ibuprofen, Advil, Aleve, Naproxen, Motrin, Aspirin for 7 days prior to surgery. If you are having pain in the week before surgery Tylenol is okay to take.    - Wear or bring a button up or zip up shirt with you to the hospital.    - Wash with surgical soap:      Showering with an antiseptic soap prior to an invasive procedure will decrease the  bacteria that is normally found on the skin.     Using 1/2 of the bottle for each application.  Be sure to use the whole bottle before coming to surgery.    The evening before surgery, shower or bathe as you normally would, using your preferred soap.  Give special attention to areas where the incisions will be made. Rinse thoroughly.  You may wash your hair with your regular shampoo.     Next wash your entire body, from the chin down, with the special antiseptic soap (full strength) using a freshly laundered clean washcloth, again giving special attention to areas where incisions will be made.    Rinse thoroughly and dry off using a freshly laundered clean towel.     Wear clean  clothes/pajamas and sleep on clean sheets    Repeat steps 2 and 3 in the morning before coming to surgery (using the rest of the bottle of soap).     **If you have a reaction to the surgical soap, let the nurse know.     Events of day:     -Check in on the 3rd floor of the hospital for your surgery.    Pre-op     - After you check in, you will meet with a pre-op nurse. They will go over your medications, review your H&P (pre-op physical), have you change into a paper gown, and your chest will be wiped again with soap.    - They will place compression boots on both legs to help decrease risk of blood clots.     - You may be asked to complete a pregnancy test. If you have had no exposure to sperm, you can decline.    - You will meet with the anesthesiologists and nurse anesthetist who will be keeping you asleep during surgery, they will be placing an IV, and will be monitoring you throughout the surgery.    - You will meet with the RN as you are being moved into operating room, they will be helping to position you and keep you comfortable during the surgery.     - Dr. Mulligan will meet you in the pre-op area to discuss any questions about surgery, make markings on your chest for planning, and to sign your consent.     Intra-op (OR)    - A catheter will be placed in your bladder after you are sleeping and is typically removed before you wake up. The longest this would typically be in is in the post-op recovery room if needed.     - There will be straps and pillows to help position you and keep you in place during the surgery.    - The tissue that is removed will be sent to pathology to be analyzed and then discarded.     - MAIK drains will be placed (one in each armpit) and a pain catheter will be placed in the center of your chest.     - Closure is done with dissolvable sutures under the skin and is then sealed with glue, and tape.    Post-op/Recovery    - You can usually go home the same day so long as you are eating,  "drinking, voiding, and pain is well controlled.  You will be sent home with all needed supplies.     - Post-Op medications you will be given in addition to the anesthesia include: Zofran (nausea), Oxycodone (pain), Z-arjun (infection), and antipruritic (itching).     - You will leave the hospitals with an ace bandage wrapped around your chest for compression. You may keep the ace bandage on until you come back for your one week post op visit or you may adjust the wrap as needed if it is either too tight or too loose.     Post-Op Cares:     - No lifting over 5 lbs for 3 weeks after surgery    - No stretching arms out or above the head (\"modified T-tho\")    - Walk around frequently to help prevent blood clots    - Do deep breathing exercises to prevent pneumonia    - No sleeping on stomach x 3 weeks after surgery, if it is difficult not to roll over onto your stomach you can sleep in a recliner or use additional pillows    - Do not use any ice packs or heat packs    - Preventing constipation will be your responsibility. You can use whatever method works best for you such as; aloe, prune juice, Sennokot or Miralax.    No showering in the first week after your surgery.     What to expect at your 1st post op visit:    When you come in for your 1st post op visit, we will assess the output of your drains and remove the pain catheter (On-Q) that was placed on your chest.     -Please remember to bring the documentations of your output with you to your appointment so we can review how much your drains have been putting out since your surgery.     -We will remove the bolsters that was placed on your nipples and teach you how to perform nipple graft dressings.     -You will be given supplies to take home and will need to continue wearing the ace wrap compression for another week after the drains are removed.       Nipple Care:   Change nipple dressings daily.  Take dressings off prior to showering and reapply after showering.  No " direct shower spray on nipple grafts for 4 weeks.     Cut vaseline gauze to nipple size and place over nipple.  Place folded white gauze over vaseline gauze and cover with plastic dressing.  Do dressing changes for 1 more week.  If you continue to have drainage, continue the dressings until drainage stops.       Drain Care:  You will be discharged with Facundo-Garcia (MAIK) drainage tubes.  These tubes drain fluid from your incision, helping to prevent swelling and reducing the risk for infection.  The tube is held in place by a few stitches. Pin your MAIK drain to your clothing by using a safety pin through the plastic loop on the top of the bulb. If the drain is not attached to your clothing, it may pull out from under your skin. Drains are uncomfortable!    Emptying the drain bulb: The measuring cup provided by the hospital or a measuring cup that is used only for the MAIK drain.  1. Wash your hands with soap and water.  2. Hold the bulb securely.  3. Remove the drainage plug from the emptying port.  4. Carefully turn the bulb upside down over the measuring cup, and gently squeeze all of the drainage into the measuring cup.  5. Squeeze the middle of the bulb firmly so that the bulb is as flat as possible.                                                                                                                                                    6. While still squeezing the bulb, replace the drainage plug. This step is important to keep the drain suction  7. Measure how much fluid you removed from the bulb.  8. Write down the amount and color of the fluid you removed from the bulb. If  you have more than one drain, keep a separate record for each one.  9. Empty the fluid into the toilet and flush.  10. Rinse the measuring cup, and wash your hands with soap and water.  11. You should empty the drain at least two times each day, in the morning and at bedtime.  12. Drainage tubes are removed when the output is less  than 20ml in a 24 hour period for 2 consecutive days.  13. Output will be somewhere between 30 to 50 mLs per day, but don't be alarmed if it is more or less. Output may not be equal between sides. Amounts will probably decrease over time.  14. The color coming from the drains may vary from deep red, light pink, or clear yellow.    Stripping the tube:  To prevent clots from blocking the drain, you will need to  strip  it. Stripping means that you use your fingers to squeeze along the length of the drain to help maintain the flow of drainage.    Wash your hands.    Using one hand, firmly hold the tubing near the insertion site (as close to your skin as the ace wrap and gauze dressing will allow). This will prevent the drain from being pulled out while you are stripping it and from pulling on skin and sutures.    [place] upper/top fingers and milk with the bottom or lower fingers.    Using your index finger and thumb of the other hand, squeeze the tubing below the  first hand. You should squeeze it firmly enough so the tubing becomes flat.     Maintain pressure on the tube, as you are squeezing, slide your index finger and thumb down the tube about 4-6 inches toward the bulb. (use alcohol wipes or lotion to help).    Then, release the hand closest to where the tube is attached to the body (making sure to keep pressure with the other hand), and move upper/anchor hand down. Squeeze, Repeat in segments.    Do not release the pressure you are creating in the tubing until you reach the bulb.  The whole tube will be flat.     If at any time it feels like the tube is pulling away from the skin or starts to hurt STOP! Then start over again more gently.     Strip the drain each time you empty it.               sore throat/ear pain

## 2024-11-27 DIAGNOSIS — F90.0 ATTENTION DEFICIT HYPERACTIVITY DISORDER (ADHD), PREDOMINANTLY INATTENTIVE TYPE: ICD-10-CM

## 2024-11-27 RX ORDER — LISDEXAMFETAMINE DIMESYLATE 30 MG/1
30 CAPSULE ORAL DAILY
Qty: 30 CAPSULE | Refills: 0 | Status: SHIPPED | OUTPATIENT
Start: 2024-11-27

## 2024-12-09 ENCOUNTER — LAB (OUTPATIENT)
Dept: LAB | Facility: CLINIC | Age: 29
End: 2024-12-09
Payer: COMMERCIAL

## 2024-12-09 DIAGNOSIS — F64.0 TRANSGENDER MAN ON HORMONE THERAPY: ICD-10-CM

## 2024-12-09 DIAGNOSIS — Z79.899 TRANSGENDER MAN ON HORMONE THERAPY: ICD-10-CM

## 2024-12-09 LAB
CHOLEST SERPL-MCNC: 127 MG/DL
FASTING STATUS PATIENT QL REPORTED: YES
HDLC SERPL-MCNC: 48 MG/DL
LDLC SERPL CALC-MCNC: 67 MG/DL
NONHDLC SERPL-MCNC: 79 MG/DL
TRIGL SERPL-MCNC: 62 MG/DL

## 2024-12-09 PROCEDURE — 36415 COLL VENOUS BLD VENIPUNCTURE: CPT

## 2024-12-09 PROCEDURE — 80061 LIPID PANEL: CPT

## 2024-12-09 PROCEDURE — 84403 ASSAY OF TOTAL TESTOSTERONE: CPT

## 2024-12-11 LAB — TESTOST SERPL-MCNC: 26 NG/DL (ref 8–950)

## 2024-12-12 ENCOUNTER — OFFICE VISIT (OUTPATIENT)
Dept: FAMILY MEDICINE | Facility: CLINIC | Age: 29
End: 2024-12-12
Payer: COMMERCIAL

## 2024-12-12 VITALS
HEART RATE: 104 BPM | BODY MASS INDEX: 32.95 KG/M2 | HEIGHT: 66 IN | RESPIRATION RATE: 15 BRPM | OXYGEN SATURATION: 96 % | SYSTOLIC BLOOD PRESSURE: 112 MMHG | DIASTOLIC BLOOD PRESSURE: 80 MMHG | TEMPERATURE: 98.4 F | WEIGHT: 205 LBS

## 2024-12-12 DIAGNOSIS — Z13.228 SCREENING FOR ENDOCRINE, NUTRITIONAL, METABOLIC AND IMMUNITY DISORDER: ICD-10-CM

## 2024-12-12 DIAGNOSIS — H91.93 HEARING DIFFICULTY, BILATERAL: ICD-10-CM

## 2024-12-12 DIAGNOSIS — F64.0 TRANSGENDER MAN ON HORMONE THERAPY: ICD-10-CM

## 2024-12-12 DIAGNOSIS — Z13.29 SCREENING FOR ENDOCRINE, NUTRITIONAL, METABOLIC AND IMMUNITY DISORDER: ICD-10-CM

## 2024-12-12 DIAGNOSIS — Z79.899 TRANSGENDER MAN ON HORMONE THERAPY: ICD-10-CM

## 2024-12-12 DIAGNOSIS — F90.0 ATTENTION DEFICIT HYPERACTIVITY DISORDER (ADHD), PREDOMINANTLY INATTENTIVE TYPE: Primary | ICD-10-CM

## 2024-12-12 DIAGNOSIS — F64.9 GENDER DYSPHORIA: ICD-10-CM

## 2024-12-12 DIAGNOSIS — Z13.21 SCREENING FOR ENDOCRINE, NUTRITIONAL, METABOLIC AND IMMUNITY DISORDER: ICD-10-CM

## 2024-12-12 DIAGNOSIS — F33.42 MAJOR DEPRESSIVE DISORDER, RECURRENT, IN FULL REMISSION (H): ICD-10-CM

## 2024-12-12 DIAGNOSIS — Z13.0 SCREENING FOR ENDOCRINE, NUTRITIONAL, METABOLIC AND IMMUNITY DISORDER: ICD-10-CM

## 2024-12-12 PROCEDURE — 99214 OFFICE O/P EST MOD 30 MIN: CPT | Mod: 25 | Performed by: FAMILY MEDICINE

## 2024-12-12 PROCEDURE — 90656 IIV3 VACC NO PRSV 0.5 ML IM: CPT | Performed by: FAMILY MEDICINE

## 2024-12-12 PROCEDURE — 90471 IMMUNIZATION ADMIN: CPT | Performed by: FAMILY MEDICINE

## 2024-12-12 PROCEDURE — 90480 ADMN SARSCOV2 VAC 1/ONLY CMP: CPT | Performed by: FAMILY MEDICINE

## 2024-12-12 PROCEDURE — 91320 SARSCV2 VAC 30MCG TRS-SUC IM: CPT | Performed by: FAMILY MEDICINE

## 2024-12-12 RX ORDER — LISDEXAMFETAMINE DIMESYLATE 40 MG/1
40 CAPSULE ORAL DAILY
Qty: 30 CAPSULE | Refills: 0 | Status: SHIPPED | OUTPATIENT
Start: 2025-02-10 | End: 2025-03-12

## 2024-12-12 RX ORDER — LISDEXAMFETAMINE DIMESYLATE 40 MG/1
40 CAPSULE ORAL DAILY
Qty: 30 CAPSULE | Refills: 0 | Status: SHIPPED | OUTPATIENT
Start: 2025-01-11 | End: 2025-02-10

## 2024-12-12 RX ORDER — LISDEXAMFETAMINE DIMESYLATE 40 MG/1
40 CAPSULE ORAL DAILY
Qty: 30 CAPSULE | Refills: 0 | Status: SHIPPED | OUTPATIENT
Start: 2024-12-12 | End: 2025-01-11

## 2024-12-12 RX ORDER — TESTOSTERONE CYPIONATE 200 MG/ML
0.5 INJECTION, SOLUTION INTRAMUSCULAR WEEKLY
Qty: 6 ML | Refills: 2 | Status: SHIPPED | OUTPATIENT
Start: 2024-12-12

## 2024-12-12 NOTE — PROGRESS NOTES
Assessment & Plan     Attention deficit hyperactivity disorder (ADHD), predominantly inattentive type  Will try an increased dose.  - lisdexamfetamine (VYVANSE) 40 MG capsule  Dispense: 30 capsule; Refill: 0  - lisdexamfetamine (VYVANSE) 40 MG capsule  Dispense: 30 capsule; Refill: 0  - lisdexamfetamine (VYVANSE) 40 MG capsule  Dispense: 30 capsule; Refill: 0    Transgender man on hormone therapy  Will get back on target regarding the medication. Discussed the importance of taking hormones regularly if the ovaries are removed and he understands.  - testosterone cypionate (DEPOTESTOSTERONE) 200 MG/ML injection  Dispense: 6 mL; Refill: 2  - Testosterone total  - Hemoglobin and hematocrit  - Albumin level  - Sex Hormone Binding Globulin  - Lipid Profile    Major depressive disorder, recurrent, in full remission (H)    Hearing difficulty, bilateral  - Adult Audiology  Referral    Gender dysphoria  - testosterone cypionate (DEPOTESTOSTERONE) 200 MG/ML injection  Dispense: 6 mL; Refill: 2    Screening for endocrine, nutritional, metabolic and immunity disorder  - Hepatitis B Surface Antibody    The longitudinal plan of care for the diagnosis(es)/condition(s) as documented were addressed during this visit. Due to the added complexity in care, I will continue to support Elder in the subsequent management and with ongoing continuity of care.    I spent a total of 32 minutes on the day of the visit.   Time spent by me today doing chart review, history and exam, documentation and further activities per the note  Subjective   Elder is a 29 year old, presenting for the following health issues:  Follow Up (Transgender care/therapy following up)      12/12/2024    12:48 PM   Additional Questions   Roomed by Jessie ANDRADE RN     Getting some SADD. Taking more vitamin D3 and keeping the light.  If the routine is off, he falls off the habit of the shots. So he missed several. Feels less energy is lower and getting weaker.  Has a good  "ritual for the morning, so will try to try to integrate the testosterone shots into. He   Repetitive motion on the skin or repetitive noises can make him go emotional.  He would like having the official diagnosis of autism if appropriate. Diagnosed with ADHD at 16 yo. Did poorly in high school and reports having dyscalcula.  Not formally employed and wonders if they would be able to get assisted income. Feels unable to read tone and facial emotions.         Objective    /80 (BP Location: Left arm, Patient Position: Sitting, Cuff Size: Adult Regular)   Pulse 104   Temp 98.4  F (36.9  C) (Tympanic)   Resp 15   Ht 1.664 m (5' 5.5\")   Wt 93 kg (205 lb)   SpO2 96%   BMI 33.59 kg/m    Body mass index is 33.59 kg/m .  Physical Exam   GENERAL: healthy, alert and no distress  EYES: grossly normal to inspection, and conjunctivae and sclerae normal  RESP: breathing unlabored, no cough or throat clearing.  MS: no gross musculoskeletal defects noted.  SKIN: no suspicious lesions or rashes visible  NEURO: Normal strength and tone, mentation intact and speech normal  PSYCH: mentation appears normal, affect normal/bright     Lab on 12/09/2024   Component Date Value Ref Range Status    Testosterone Total 12/09/2024 26  8 - 950 ng/dL Final    Cholesterol 12/09/2024 127  <200 mg/dL Final    Triglycerides 12/09/2024 62  <150 mg/dL Final    Direct Measure HDL 12/09/2024 48  >=40 mg/dL Final    LDL Cholesterol Calculated 12/09/2024 67  <100 mg/dL Final    Non HDL Cholesterol 12/09/2024 79  <130 mg/dL Final    Patient Fasting > 8hrs? 12/09/2024 Yes   Final     Signed Electronically by: YULIA LAO MD    "

## 2024-12-12 NOTE — PATIENT INSTRUCTIONS
[email or web address] (aptitude test)    Neuropsych testing.    - [facility] - with locations throughout the [facility] area: [phone].     - [facility] - with locations throughout the [facility] area: [phone].     - [facility] - with locations throughout the [facility] area: [phone].

## 2025-01-29 ENCOUNTER — TELEPHONE (OUTPATIENT)
Dept: OBGYN | Facility: CLINIC | Age: 30
End: 2025-01-29
Payer: COMMERCIAL

## 2025-01-29 NOTE — TELEPHONE ENCOUNTER
----- Message from Leda LYON sent at 1/29/2025  1:43 PM CST -----  Regarding: Post op with Anthony Pisano can someone call [facility] and help schedule a 6 week post op appointment with Dr. Anthony MARTINEZ 2/18    Thanks    Gwendolyn

## 2025-02-05 ENCOUNTER — OFFICE VISIT (OUTPATIENT)
Dept: FAMILY MEDICINE | Facility: CLINIC | Age: 30
End: 2025-02-05
Payer: COMMERCIAL

## 2025-02-05 VITALS
SYSTOLIC BLOOD PRESSURE: 108 MMHG | DIASTOLIC BLOOD PRESSURE: 66 MMHG | HEART RATE: 96 BPM | HEIGHT: 66 IN | RESPIRATION RATE: 17 BRPM | TEMPERATURE: 97.3 F | WEIGHT: 211.7 LBS | OXYGEN SATURATION: 95 % | BODY MASS INDEX: 34.02 KG/M2

## 2025-02-05 DIAGNOSIS — E66.811 CLASS 1 DRUG-INDUCED OBESITY WITHOUT SERIOUS COMORBIDITY WITH BODY MASS INDEX (BMI) OF 34.0 TO 34.9 IN ADULT: ICD-10-CM

## 2025-02-05 DIAGNOSIS — E66.1 CLASS 1 DRUG-INDUCED OBESITY WITHOUT SERIOUS COMORBIDITY WITH BODY MASS INDEX (BMI) OF 34.0 TO 34.9 IN ADULT: ICD-10-CM

## 2025-02-05 DIAGNOSIS — K21.9 GASTROESOPHAGEAL REFLUX DISEASE WITHOUT ESOPHAGITIS: ICD-10-CM

## 2025-02-05 DIAGNOSIS — F64.9 GENDER DYSPHORIA: ICD-10-CM

## 2025-02-05 DIAGNOSIS — Z01.818 PREOP GENERAL PHYSICAL EXAM: Primary | ICD-10-CM

## 2025-02-05 PROBLEM — E66.01 CLASS 2 SEVERE OBESITY DUE TO EXCESS CALORIES WITH SERIOUS COMORBIDITY IN ADULT (H): Status: RESOLVED | Noted: 2024-01-20 | Resolved: 2025-02-05

## 2025-02-05 PROBLEM — E66.812 CLASS 2 SEVERE OBESITY DUE TO EXCESS CALORIES WITH SERIOUS COMORBIDITY IN ADULT (H): Status: RESOLVED | Noted: 2024-01-20 | Resolved: 2025-02-05

## 2025-02-05 LAB
BASOPHILS # BLD AUTO: 0 10E3/UL (ref 0–0.2)
BASOPHILS NFR BLD AUTO: 0 %
EOSINOPHIL # BLD AUTO: 0.1 10E3/UL (ref 0–0.7)
EOSINOPHIL NFR BLD AUTO: 1 %
ERYTHROCYTE [DISTWIDTH] IN BLOOD BY AUTOMATED COUNT: 12.4 % (ref 10–15)
HCT VFR BLD AUTO: 41.9 % (ref 35–53)
HGB BLD-MCNC: 14.1 G/DL (ref 11.7–17.7)
IMM GRANULOCYTES # BLD: 0 10E3/UL
IMM GRANULOCYTES NFR BLD: 0 %
LYMPHOCYTES # BLD AUTO: 2.5 10E3/UL (ref 0.8–5.3)
LYMPHOCYTES NFR BLD AUTO: 35 %
MCH RBC QN AUTO: 29.5 PG (ref 26.5–33)
MCHC RBC AUTO-ENTMCNC: 33.7 G/DL (ref 31.5–36.5)
MCV RBC AUTO: 88 FL (ref 78–100)
MONOCYTES # BLD AUTO: 0.6 10E3/UL (ref 0–1.3)
MONOCYTES NFR BLD AUTO: 8 %
NEUTROPHILS # BLD AUTO: 4 10E3/UL (ref 1.6–8.3)
NEUTROPHILS NFR BLD AUTO: 57 %
PLATELET # BLD AUTO: 248 10E3/UL (ref 150–450)
RBC # BLD AUTO: 4.78 10E6/UL (ref 3.8–5.9)
WBC # BLD AUTO: 7.1 10E3/UL (ref 4–11)

## 2025-02-05 PROCEDURE — G2211 COMPLEX E/M VISIT ADD ON: HCPCS | Performed by: FAMILY MEDICINE

## 2025-02-05 PROCEDURE — 99214 OFFICE O/P EST MOD 30 MIN: CPT | Performed by: FAMILY MEDICINE

## 2025-02-05 PROCEDURE — 85025 COMPLETE CBC W/AUTO DIFF WBC: CPT | Performed by: FAMILY MEDICINE

## 2025-02-05 PROCEDURE — 36415 COLL VENOUS BLD VENIPUNCTURE: CPT | Performed by: FAMILY MEDICINE

## 2025-02-05 RX ORDER — FAMOTIDINE 40 MG/1
40 TABLET, FILM COATED ORAL 2 TIMES DAILY
Qty: 180 TABLET | Refills: 3 | Status: SHIPPED | OUTPATIENT
Start: 2025-02-05

## 2025-02-05 NOTE — PATIENT INSTRUCTIONS
How to Take Your Medication Before Surgery  Preoperative Medication Instructions   Antiplatelet or Anticoagulation Medication Instructions   - Patient is on no antiplatelet or anticoagulation medications.    Additional Medication Instructions   - ibuprofen (Advil, Motrin): DO NOT TAKE 1 day before surgery.    - Psychostimulants: Hold the day of surgery  No cannabis for 24 hours before  Ibuprofen actually stop three days before.         Patient Education   Preparing for Your Surgery  For Adults  Getting started  In most cases, a nurse will call to review your health history and instructions. They will give you an arrival time based on your scheduled surgery time. Please be ready to share:  Your doctor's clinic name and phone number  Your medical, surgical, and anesthesia history  A list of allergies and sensitivities  A list of medicines, including herbal treatments and over-the-counter drugs  Whether the patient has a legal guardian (ask how to send us the papers in advance)  Note: You may not receive a call if you were seen at our PAC (Preoperative Assessment Center).  Please tell us if you're pregnant--or if there's any chance you might be pregnant. Some surgeries may injure a fetus (unborn baby), so they require a pregnancy test. Surgeries that are safe for a fetus don't always need a test, and you can choose whether to have one.   Preparing for surgery  Within 10 to 30 days of surgery: Have a pre-op exam (sometimes called an H&P, or History and Physical). This can be done at a clinic or pre-operative center.  If you're having a , you may not need this exam. Talk to your care team.  At your pre-op exam, talk to your care team about all medicines you take. (This includes CBD oil and any drugs, such as THC, marijuana, and other forms of cannabis.) If you need to stop any medicine before surgery, ask when to start taking it again.  This is for your safety. Many medicines and drugs can make you bleed too  much during surgery. Some change how well surgery (anesthesia) drugs work.  Call your insurance company to let them know you're having surgery. (If you don't have insurance, call [phone].)  Call your clinic if there's any change in your health. This includes a scrape or scratch near the surgery site, or any signs of a cold (sore throat, runny nose, cough, rash, fever).  Eating and drinking guidelines  For your safety: Unless your surgeon tells you otherwise, follow the guidelines below.  Eat and drink as normal until 8 hours before you arrive for surgery. After that, no food or milk. You can spit out gum when you arrive.  Drink clear liquids until 2 hours before you arrive. These are liquids you can see through, like water, Gatorade, and Propel Water. They also include plain black coffee and tea (no cream or milk).  No alcohol for 24 hours before you arrive. The night before surgery, stop any drinks that contain THC.  If your care team tells you to take medicine on the morning of surgery, it's okay to take it with a sip of water. No other medicines or drugs are allowed (including CBD oil)--follow your care team's instructions.  If you have questions the day of surgery, call your hospital or surgery center.   Preventing infection  Shower or bathe the night before and the morning of surgery. Follow the instructions your clinic gave you. (If no instructions, use regular soap.)  Don't shave or clip hair near your surgery site. We'll remove the hair if needed.  Don't smoke or vape the morning of surgery. No chewing tobacco for 6 hours before you arrive. A nicotine patch is okay. You may spit out nicotine gum when you arrive.  For some surgeries, the surgeon will tell you to fully quit smoking and nicotine.  We will make every effort to keep you safe from infection. We will:  Clean our hands often with soap and water (or an alcohol-based hand rub).  Clean the skin at your surgery site with a special soap that kills  germs.  Give you a special gown to keep you warm. (Cold raises the risk of infection.)  Wear hair covers, masks, gowns, and gloves during surgery.  Give antibiotic medicine, if prescribed. Not all surgeries need this medicine.  What to bring on the day of surgery  Photo ID and insurance card  Copy of your health care directive, if you have one  Glasses and hearing aids (bring cases)  You can't wear contacts during surgery  Inhaler and eye drops, if you use them (tell us about these when you arrive)  CPAP machine or breathing device, if you use them  A few personal items, if spending the night  If you have . . .  A pacemaker, ICD (cardiac defibrillator), or other implant: Bring the ID card.  An implanted stimulator: Bring the remote control.  A legal guardian: Bring a copy of the certified (court-stamped) guardianship papers.  Please remove any jewelry, including body piercings. Leave jewelry and other valuables at home.  If you're going home the day of surgery  You must have a responsible adult drive you home. They should stay with you overnight as well.  If you don't have someone to stay with you, and you aren't safe to go home alone, we may keep you overnight. Insurance often won't pay for this.  After surgery  If it's hard to control your pain or you need more pain medicine, please call your surgeon's office.  Questions?   If you have any questions for your care team, list them here:   ____________________________________________________________________________________________________________________________________________________________________________________________________________________________________________________________  For informational purposes only. Not to replace the advice of your health care provider. Copyright   2003, 2019 [facility]. All rights reserved. Clinically reviewed by Eyal Murillo MD. [employer] [id number] - REV 08/24.     Patient Education   Preparing for Your  Surgery  For Adults  Getting started  In most cases, a nurse will call to review your health history and instructions. They will give you an arrival time based on your scheduled surgery time. Please be ready to share:  Your doctor's clinic name and phone number  Your medical, surgical, and anesthesia history  A list of allergies and sensitivities  A list of medicines, including herbal treatments and over-the-counter drugs  Whether the patient has a legal guardian (ask how to send us the papers in advance)  Note: You may not receive a call if you were seen at our PAC (Preoperative Assessment Center).  Please tell us if you're pregnant--or if there's any chance you might be pregnant. Some surgeries may injure a fetus (unborn baby), so they require a pregnancy test. Surgeries that are safe for a fetus don't always need a test, and you can choose whether to have one.   Preparing for surgery  Within 10 to 30 days of surgery: Have a pre-op exam (sometimes called an H&P, or History and Physical). This can be done at a clinic or pre-operative center.  If you're having a , you may not need this exam. Talk to your care team.  At your pre-op exam, talk to your care team about all medicines you take. (This includes CBD oil and any drugs, such as THC, marijuana, and other forms of cannabis.) If you need to stop any medicine before surgery, ask when to start taking it again.  This is for your safety. Many medicines and drugs can make you bleed too much during surgery. Some change how well surgery (anesthesia) drugs work.  Call your insurance company to let them know you're having surgery. (If you don't have insurance, call [phone].)  Call your clinic if there's any change in your health. This includes a scrape or scratch near the surgery site, or any signs of a cold (sore throat, runny nose, cough, rash, fever).  Eating and drinking guidelines  For your safety: Unless your surgeon tells you otherwise, follow the  guidelines below.  Eat and drink as normal until 8 hours before you arrive for surgery. After that, no food or milk. You can spit out gum when you arrive.  Drink clear liquids until 2 hours before you arrive. These are liquids you can see through, like water, Gatorade, and Propel Water. They also include plain black coffee and tea (no cream or milk).  No alcohol for 24 hours before you arrive. The night before surgery, stop any drinks that contain THC.  If your care team tells you to take medicine on the morning of surgery, it's okay to take it with a sip of water. No other medicines or drugs are allowed (including CBD oil)--follow your care team's instructions.  If you have questions the day of surgery, call your hospital or surgery center.   Preventing infection  Shower or bathe the night before and the morning of surgery. Follow the instructions your clinic gave you. (If no instructions, use regular soap.)  Don't shave or clip hair near your surgery site. We'll remove the hair if needed.  Don't smoke or vape the morning of surgery. No chewing tobacco for 6 hours before you arrive. A nicotine patch is okay. You may spit out nicotine gum when you arrive.  For some surgeries, the surgeon will tell you to fully quit smoking and nicotine.  We will make every effort to keep you safe from infection. We will:  Clean our hands often with soap and water (or an alcohol-based hand rub).  Clean the skin at your surgery site with a special soap that kills germs.  Give you a special gown to keep you warm. (Cold raises the risk of infection.)  Wear hair covers, masks, gowns, and gloves during surgery.  Give antibiotic medicine, if prescribed. Not all surgeries need this medicine.  What to bring on the day of surgery  Photo ID and insurance card  Copy of your health care directive, if you have one  Glasses and hearing aids (bring cases)  You can't wear contacts during surgery  Inhaler and eye drops, if you use them (tell us about  these when you arrive)  CPAP machine or breathing device, if you use them  A few personal items, if spending the night  If you have . . .  A pacemaker, ICD (cardiac defibrillator), or other implant: Bring the ID card.  An implanted stimulator: Bring the remote control.  A legal guardian: Bring a copy of the certified (court-stamped) guardianship papers.  Please remove any jewelry, including body piercings. Leave jewelry and other valuables at home.  If you're going home the day of surgery  You must have a responsible adult drive you home. They should stay with you overnight as well.  If you don't have someone to stay with you, and you aren't safe to go home alone, we may keep you overnight. Insurance often won't pay for this.  After surgery  If it's hard to control your pain or you need more pain medicine, please call your surgeon's office.  Questions?   If you have any questions for your care team, list them here:   ____________________________________________________________________________________________________________________________________________________________________________________________________________________________________________________________  For informational purposes only. Not to replace the advice of your health care provider. Copyright   2003, 2019 [employer]. All rights reserved. Clinically reviewed by Eyal Murillo MD. [employer] [id number] - REV 08/24.

## 2025-02-05 NOTE — PROGRESS NOTES
Preoperative Evaluation  [facility]  [address] [place] [address]  Phone: [phone]  Fax: [phone]  Primary Provider: YULIA LAO MD  Pre-op Performing Provider: YULIA LAO MD  Feb 5, 2025 2/5/2025   Surgical Information   What procedure is being done? Hysterectomy   Facility or Hospital where procedure/surgery will be performed: [facility]   Who is doing the procedure / surgery? [facility]   Date of surgery / procedure: February 18, 2025   Time of surgery / procedure: 12:30 PM   Where do you plan to recover after surgery? at home with family     Fax number for surgical facility: Note does not need to be faxed, will be available electronically in Epic.    Assessment & Plan     The proposed surgical procedure is considered INTERMEDIATE risk.    Preop general physical exam  - CBC with platelets and differential; Future    Gender dysphoria  Well established. Lives as a man and started hormonal transition over 4 years ago.    Gastroesophageal reflux disease without esophagitis  [email or web address]  - famotidine (PEPCID) 40 MG tablet; Take 1 tablet (40 mg) by mouth 2 times daily.  - CBC with platelets and differential; Future    Class 1 drug-induced obesity without serious comorbidity with body mass index (BMI) of 34.0 to 34.9 in adult  Making changes in lifestyle, though exercise will be on hold during the postop period.    The longitudinal plan of care for the diagnosis(es)/condition(s) as documented were addressed during this visit. Due to the added complexity in care, I will continue to support Zed in the subsequent management and with ongoing continuity of care.     - No identified additional risk factors other than previously addressed    Preoperative Medication Instructions  Antiplatelet or Anticoagulation Medication  Instructions   - Patient is on no antiplatelet or anticoagulation medications.    Additional Medication Instructions   - ibuprofen (Advil, Motrin): DO NOT TAKE 1 day before surgery.    - Psychostimulants: Hold the day of surgery  No cannabis for 24 hours before  Ibuprofen actually stop three days before.    Recommendation  Approval given to proceed with proposed procedure, without further diagnostic evaluation.    Cinda Friedman is a 29 year old, presenting for the following:  Pre-Op Exam          2/5/2025     8:49 AM   Additional Questions   Roomed by Antonino VALLADARES RN     HPI related to upcoming procedure: Initially started medical transition in July of 2020. Had a gender-affirming bilateral mastectomy in 2020 without complications.  Plans on hysterctomy with bilateral oophorectomy.        2/5/2025   Pre-Op Questionnaire   Have you ever had a heart attack or stroke? No   Have you ever had surgery on your heart or blood vessels, such as a stent placement, a coronary artery bypass, or surgery on an artery in your head, neck, heart, or legs? No   Do you have chest pain with activity? No   Do you have a history of heart failure? No   Do you currently have a cold, bronchitis or symptoms of other infection? No   Do you have a cough, shortness of breath, or wheezing? No   Do you or anyone in your family have previous history of blood clots? (!) YES Grandmother, no first degree relative   Do you or does anyone in your family have a serious bleeding problem such as prolonged bleeding following surgeries or cuts? No   Have you ever had problems with anemia or been told to take iron pills? (!) YES A little anemic many years ago.   Have you had any abnormal blood loss such as black, tarry or bloody stools, or abnormal vaginal bleeding? No   Have you ever had a blood transfusion? No   Are you willing to have a blood transfusion if it is medically needed before, during, or after your surgery? Yes   Have you or any of your  relatives ever had problems with anesthesia? No   Do you have sleep apnea, excessive snoring or daytime drowsiness? No   Do you have any artifical heart valves or other implanted medical devices like a pacemaker, defibrillator, or continuous glucose monitor? No   Do you have artificial joints? No   Are you allergic to latex? No     Health Care Directive  Patient does not have a Health Care Directive: Discussed advance care planning with patient; information given to patient to review.    Preoperative Review of    reviewed - controlled substances reflected in medication list.      Patient Active Problem List    Diagnosis Date Noted    Class 2 severe obesity due to excess calories with serious comorbidity in adult (H) 01/20/2024     Priority: Medium    ADHD (attention deficit hyperactivity disorder) 07/21/2020     Priority: Medium    Anxiety 07/21/2020     Priority: Medium    Major depressive disorder, recurrent, in full remission 07/21/2020     Priority: Medium    Gender dysphoria 07/21/2020     Priority: Medium    Migraine with aura and without status migrainosus, not intractable 07/21/2020     Priority: Medium    Acid reflux 07/21/2020     Priority: Medium     Managed with OTC meds        No past medical history on file.  Past Surgical History:   Procedure Laterality Date    ADENOIDECTOMY      COSMETIC PLACEMENT OF DENTAL IMPLANT(S)      HC TOOTH EXTRACTION W/FORCEP      MASTECTOMY SIMPLE BILATERAL Bilateral 12/17/2021    Procedure: MASTECTOMY, BILATERAL, SIMPLE, WITH nipple grafts. OnQ;  Surgeon: Lashanda Mulligan MD;  Location: [facility] OR    TONSILLECTOMY       Current Outpatient Medications   Medication Sig Dispense Refill    acetaminophen (TYLENOL) 325 MG tablet Take 325-650 mg by mouth every 6 hours as needed for mild pain      fluticasone (FLONASE) 50 MCG/ACT nasal spray Spray 1 spray into both nostrils daily 9.9 mL 0    ibuprofen (ADVIL/MOTRIN) 200 MG capsule Take 200 mg by mouth every 4 hours as  "needed for fever       lisdexamfetamine (VYVANSE) 40 MG capsule Take 1 capsule (40 mg) by mouth daily. 30 capsule 0    [START ON 2/10/2025] lisdexamfetamine (VYVANSE) 40 MG capsule Take 1 capsule (40 mg) by mouth daily. 30 capsule 0    loratadine (CLARITIN) 10 MG tablet Take 1 tablet (10 mg) by mouth daily 30 tablet 0    SUMAtriptan (IMITREX) 25 MG tablet Take 25 mg by mouth at onset of headache for migraine      syringe/needle, sisp, (BD LUER-LCAYTON SYRINGE) 25G X 5/8\" 1 ML MISC Inject 0.4 mLs Subcutaneous once a week 10 each 5    testosterone cypionate (DEPOTESTOSTERONE) 200 MG/ML injection Inject 0.5 mLs (100 mg) subcutaneously once a week. 6 mL 2    VYVANSE 30 MG capsule TAKE 1 CAPSULE (30 MG) BY MOUTH DAILY. 30 capsule 0     No Known Allergies     Social History     Tobacco Use    Smoking status: Never    Smokeless tobacco: Never   Substance Use Topics    Alcohol use: Yes     Comment: Never more than one, and goes weeks without.     Family History   Problem Relation Age of Onset    Thyroid Disease Mother     Impaired Fasting Glucose Father     No Known Problems Brother     Clotting Disorder Maternal Grandmother     Lung Cancer Maternal Grandmother     Cancer Maternal Grandfather         Colon Ca    Chronic Obstructive Pulmonary Disease Maternal Grandfather     Chronic Obstructive Pulmonary Disease Paternal Grandfather      History   Drug Use    Frequency: 1.0 time per week    Types: Marijuana     Review of Systems  Constitutional, HEENT, cardiovascular, pulmonary, GI, , musculoskeletal, neuro, skin, endocrine and psych systems are negative, except as otherwise noted. Gets migraines twice a month-- first line treatment is ibuprofen and pushing water.    Objective    /66 (BP Location: Left arm, Patient Position: Sitting, Cuff Size: Adult Regular)   Pulse 96   Temp 97.3  F (36.3  C) (Tympanic)   Resp 17   Ht 1.664 m (5' 5.5\")   Wt 96 kg (211 lb 11.2 oz)   SpO2 95%   BMI 34.69 kg/m     Estimated body " "mass index is 34.69 kg/m  as calculated from the following:    Height as of this encounter: 1.664 m (5' 5.5\").    Weight as of this encounter: 96 kg (211 lb 11.2 oz).  Physical Exam  GENERAL: alert and no distress  EYES: Eyes grossly normal to inspection, PERRL and conjunctivae and sclerae normal  HENT: ear canals and TM's normal, nose and mouth without ulcers or lesions  NECK: no adenopathy, no asymmetry, masses, or scars  RESP: lungs clear to auscultation - no rales, rhonchi or wheezes  CV: regular rate and rhythm, normal S1 S2, no S3 or S4, no murmur, click or rub, no peripheral edema  ABDOMEN: soft, nontender, no hepatosplenomegaly, no masses and bowel sounds normal  MS: no gross musculoskeletal defects noted, no edema  SKIN: no suspicious lesions or rashes  NEURO: Normal strength and tone, mentation intact and speech normal  PSYCH: mentation appears normal, affect normal/bright    Recent Labs   Lab Test 09/20/24  1128 06/21/24  1303 03/25/24  1603   HGB 13.9 13.8 13.6   PLT  --   --  276   NA  --   --  138   POTASSIUM  --   --  4.4   CR  --   --  0.73   Diagnostics  Recent Results (from the past 24 hours)   CBC with platelets and differential    Collection Time: 02/05/25  9:52 AM   Result Value Ref Range    WBC Count 7.1 4.0 - 11.0 10e3/uL    RBC Count 4.78 3.80 - 5.90 10e6/uL    Hemoglobin 14.1 11.7 - 17.7 g/dL    Hematocrit 41.9 35.0 - 53.0 %    MCV 88 78 - 100 fL    MCH 29.5 26.5 - 33.0 pg    MCHC 33.7 31.5 - 36.5 g/dL    RDW 12.4 10.0 - 15.0 %    Platelet Count 248 150 - 450 10e3/uL    % Neutrophils 57 %    % Lymphocytes 35 %    % Monocytes 8 %    % Eosinophils 1 %    % Basophils 0 %    % Immature Granulocytes 0 %    Absolute Neutrophils 4.0 1.6 - 8.3 10e3/uL    Absolute Lymphocytes 2.5 0.8 - 5.3 10e3/uL    Absolute Monocytes 0.6 0.0 - 1.3 10e3/uL    Absolute Eosinophils 0.1 0.0 - 0.7 10e3/uL    Absolute Basophils 0.0 0.0 - 0.2 10e3/uL    Absolute Immature Granulocytes 0.0 <=0.4 10e3/uL      No EKG " required, no history of coronary heart disease, significant arrhythmia, peripheral arterial disease or other structural heart disease.    Revised Cardiac Risk Index (RCRI)  The patient has the following serious cardiovascular risks for perioperative complications:   - No serious cardiac risks = 0 points     RCRI Interpretation: 0 points: Class I (very low risk - 0.4% complication rate)  Signed Electronically by: YULIA LAO MD  A copy of this evaluation report is provided to the requesting physician.

## 2025-02-17 ENCOUNTER — ANESTHESIA EVENT (OUTPATIENT)
Dept: SURGERY | Facility: CLINIC | Age: 30
End: 2025-02-17
Payer: COMMERCIAL

## 2025-02-18 ENCOUNTER — HOSPITAL ENCOUNTER (OUTPATIENT)
Facility: CLINIC | Age: 30
Setting detail: OBSERVATION
Discharge: HOME | End: 2025-02-19
Attending: OBSTETRICS & GYNECOLOGY | Admitting: OBSTETRICS & GYNECOLOGY
Payer: COMMERCIAL

## 2025-02-18 ENCOUNTER — ANESTHESIA (OUTPATIENT)
Dept: SURGERY | Facility: CLINIC | Age: 30
End: 2025-02-18
Payer: COMMERCIAL

## 2025-02-18 DIAGNOSIS — Z90.710 S/P HYSTERECTOMY WITH OOPHORECTOMY: Primary | ICD-10-CM

## 2025-02-18 DIAGNOSIS — Z90.721 S/P HYSTERECTOMY WITH OOPHORECTOMY: Primary | ICD-10-CM

## 2025-02-18 DIAGNOSIS — Z41.8 ENCOUNTER FOR GENDER AFFIRMATION PROCEDURE: ICD-10-CM

## 2025-02-18 LAB
ERYTHROCYTE [DISTWIDTH] IN BLOOD BY AUTOMATED COUNT: 12.3 % (ref 10–15)
HCG UR QL: NEGATIVE
HCT VFR BLD AUTO: 39.3 % (ref 35–53)
HGB BLD-MCNC: 12.9 G/DL (ref 11.7–17.7)
HOLD SPECIMEN: NORMAL
MCH RBC QN AUTO: 29.1 PG (ref 26.5–33)
MCHC RBC AUTO-ENTMCNC: 32.8 G/DL (ref 31.5–36.5)
MCV RBC AUTO: 89 FL (ref 78–100)
PLATELET # BLD AUTO: 221 10E3/UL (ref 150–450)
RBC # BLD AUTO: 4.43 10E6/UL (ref 3.8–5.9)
WBC # BLD AUTO: 14.9 10E3/UL (ref 4–11)

## 2025-02-18 PROCEDURE — 88305 TISSUE EXAM BY PATHOLOGIST: CPT | Mod: TC | Performed by: OBSTETRICS & GYNECOLOGY

## 2025-02-18 PROCEDURE — S2900 ROBOTIC SURGICAL SYSTEM: HCPCS | Mod: KX | Performed by: OBSTETRICS & GYNECOLOGY

## 2025-02-18 PROCEDURE — 250N000009 HC RX 250: Performed by: OBSTETRICS & GYNECOLOGY

## 2025-02-18 PROCEDURE — 250N000025 HC SEVOFLURANE, PER MIN: Performed by: OBSTETRICS & GYNECOLOGY

## 2025-02-18 PROCEDURE — 258N000003 HC RX IP 258 OP 636

## 2025-02-18 PROCEDURE — 85041 AUTOMATED RBC COUNT: CPT

## 2025-02-18 PROCEDURE — 36415 COLL VENOUS BLD VENIPUNCTURE: CPT

## 2025-02-18 PROCEDURE — 370N000017 HC ANESTHESIA TECHNICAL FEE, PER MIN: Performed by: OBSTETRICS & GYNECOLOGY

## 2025-02-18 PROCEDURE — 250N000011 HC RX IP 250 OP 636: Performed by: ANESTHESIOLOGY

## 2025-02-18 PROCEDURE — G0378 HOSPITAL OBSERVATION PER HR: HCPCS

## 2025-02-18 PROCEDURE — 250N000011 HC RX IP 250 OP 636: Performed by: OBSTETRICS & GYNECOLOGY

## 2025-02-18 PROCEDURE — 96374 THER/PROPH/DIAG INJ IV PUSH: CPT

## 2025-02-18 PROCEDURE — 250N000013 HC RX MED GY IP 250 OP 250 PS 637: Performed by: ANESTHESIOLOGY

## 2025-02-18 PROCEDURE — 88307 TISSUE EXAM BY PATHOLOGIST: CPT | Mod: 26 | Performed by: PATHOLOGY

## 2025-02-18 PROCEDURE — 250N000013 HC RX MED GY IP 250 OP 250 PS 637: Performed by: OBSTETRICS & GYNECOLOGY

## 2025-02-18 PROCEDURE — 58571 TLH W/T/O 250 G OR LESS: CPT | Mod: KX | Performed by: OBSTETRICS & GYNECOLOGY

## 2025-02-18 PROCEDURE — 250N000009 HC RX 250

## 2025-02-18 PROCEDURE — 81025 URINE PREGNANCY TEST: CPT | Performed by: OBSTETRICS & GYNECOLOGY

## 2025-02-18 PROCEDURE — 999N000141 HC STATISTIC PRE-PROCEDURE NURSING ASSESSMENT: Performed by: OBSTETRICS & GYNECOLOGY

## 2025-02-18 PROCEDURE — 250N000009 HC RX 250: Performed by: ANESTHESIOLOGY

## 2025-02-18 PROCEDURE — 272N000001 HC OR GENERAL SUPPLY STERILE: Performed by: OBSTETRICS & GYNECOLOGY

## 2025-02-18 PROCEDURE — 710N000010 HC RECOVERY PHASE 1, LEVEL 2, PER MIN: Performed by: OBSTETRICS & GYNECOLOGY

## 2025-02-18 PROCEDURE — 250N000011 HC RX IP 250 OP 636

## 2025-02-18 PROCEDURE — 85014 HEMATOCRIT: CPT

## 2025-02-18 PROCEDURE — 250N000011 HC RX IP 250 OP 636: Mod: JZ | Performed by: ANESTHESIOLOGY

## 2025-02-18 PROCEDURE — 250N000011 HC RX IP 250 OP 636: Mod: JZ | Performed by: OBSTETRICS & GYNECOLOGY

## 2025-02-18 PROCEDURE — 250N000009 HC RX 250: Mod: JZ | Performed by: ANESTHESIOLOGY

## 2025-02-18 PROCEDURE — 360N000080 HC SURGERY LEVEL 7, PER MIN: Performed by: OBSTETRICS & GYNECOLOGY

## 2025-02-18 RX ORDER — HYDROMORPHONE HYDROCHLORIDE 1 MG/ML
0.2 INJECTION, SOLUTION INTRAMUSCULAR; INTRAVENOUS; SUBCUTANEOUS EVERY 5 MIN PRN
Status: DISCONTINUED | OUTPATIENT
Start: 2025-02-18 | End: 2025-02-18 | Stop reason: HOSPADM

## 2025-02-18 RX ORDER — ONDANSETRON 4 MG/1
4 TABLET, ORALLY DISINTEGRATING ORAL EVERY 6 HOURS PRN
Status: DISCONTINUED | OUTPATIENT
Start: 2025-02-18 | End: 2025-02-19 | Stop reason: HOSPADM

## 2025-02-18 RX ORDER — CALCIUM CARBONATE 500 MG/1
500 TABLET, CHEWABLE ORAL 4 TIMES DAILY PRN
Status: DISCONTINUED | OUTPATIENT
Start: 2025-02-18 | End: 2025-02-19 | Stop reason: HOSPADM

## 2025-02-18 RX ORDER — OXYCODONE HYDROCHLORIDE 5 MG/1
5 TABLET ORAL EVERY 6 HOURS PRN
Qty: 6 TABLET | Refills: 0 | Status: SHIPPED | OUTPATIENT
Start: 2025-02-18 | End: 2025-02-21

## 2025-02-18 RX ORDER — ACETAMINOPHEN 325 MG/1
650 TABLET ORAL EVERY 6 HOURS PRN
Status: DISCONTINUED | OUTPATIENT
Start: 2025-02-18 | End: 2025-02-19 | Stop reason: HOSPADM

## 2025-02-18 RX ORDER — CEFAZOLIN SODIUM/WATER 2 G/20 ML
2 SYRINGE (ML) INTRAVENOUS
Status: COMPLETED | OUTPATIENT
Start: 2025-02-18 | End: 2025-02-18

## 2025-02-18 RX ORDER — LIDOCAINE HYDROCHLORIDE 20 MG/ML
INJECTION, SOLUTION INFILTRATION; PERINEURAL PRN
Status: DISCONTINUED | OUTPATIENT
Start: 2025-02-18 | End: 2025-02-18

## 2025-02-18 RX ORDER — LIDOCAINE 40 MG/G
CREAM TOPICAL
Status: DISCONTINUED | OUTPATIENT
Start: 2025-02-18 | End: 2025-02-19 | Stop reason: HOSPADM

## 2025-02-18 RX ORDER — OXYCODONE HYDROCHLORIDE 10 MG/1
10 TABLET ORAL EVERY 4 HOURS PRN
Status: DISCONTINUED | OUTPATIENT
Start: 2025-02-18 | End: 2025-02-19 | Stop reason: HOSPADM

## 2025-02-18 RX ORDER — LABETALOL HYDROCHLORIDE 5 MG/ML
10 INJECTION, SOLUTION INTRAVENOUS
Status: DISCONTINUED | OUTPATIENT
Start: 2025-02-18 | End: 2025-02-18 | Stop reason: HOSPADM

## 2025-02-18 RX ORDER — PHENAZOPYRIDINE HYDROCHLORIDE 200 MG/1
200 TABLET, FILM COATED ORAL ONCE
Status: COMPLETED | OUTPATIENT
Start: 2025-02-18 | End: 2025-02-18

## 2025-02-18 RX ORDER — NALOXONE HYDROCHLORIDE 0.4 MG/ML
0.2 INJECTION, SOLUTION INTRAMUSCULAR; INTRAVENOUS; SUBCUTANEOUS
Status: DISCONTINUED | OUTPATIENT
Start: 2025-02-18 | End: 2025-02-19 | Stop reason: HOSPADM

## 2025-02-18 RX ORDER — OXYCODONE HYDROCHLORIDE 5 MG/1
5 TABLET ORAL EVERY 4 HOURS PRN
Status: DISCONTINUED | OUTPATIENT
Start: 2025-02-18 | End: 2025-02-19 | Stop reason: HOSPADM

## 2025-02-18 RX ORDER — ACETAMINOPHEN 325 MG/1
975 TABLET ORAL ONCE
Status: COMPLETED | OUTPATIENT
Start: 2025-02-19 | End: 2025-02-19

## 2025-02-18 RX ORDER — NALOXONE HYDROCHLORIDE 0.4 MG/ML
0.1 INJECTION, SOLUTION INTRAMUSCULAR; INTRAVENOUS; SUBCUTANEOUS
Status: DISCONTINUED | OUTPATIENT
Start: 2025-02-18 | End: 2025-02-18 | Stop reason: HOSPADM

## 2025-02-18 RX ORDER — FENTANYL CITRATE 50 UG/ML
25-50 INJECTION, SOLUTION INTRAMUSCULAR; INTRAVENOUS
Status: DISCONTINUED | OUTPATIENT
Start: 2025-02-18 | End: 2025-02-18 | Stop reason: HOSPADM

## 2025-02-18 RX ORDER — TRANEXAMIC ACID 10 MG/ML
1 INJECTION, SOLUTION INTRAVENOUS ONCE
Status: COMPLETED | OUTPATIENT
Start: 2025-02-18 | End: 2025-02-18

## 2025-02-18 RX ORDER — DEXAMETHASONE SODIUM PHOSPHATE 4 MG/ML
INJECTION, SOLUTION INTRA-ARTICULAR; INTRALESIONAL; INTRAMUSCULAR; INTRAVENOUS; SOFT TISSUE PRN
Status: DISCONTINUED | OUTPATIENT
Start: 2025-02-18 | End: 2025-02-18

## 2025-02-18 RX ORDER — DEXAMETHASONE SODIUM PHOSPHATE 4 MG/ML
4 INJECTION, SOLUTION INTRA-ARTICULAR; INTRALESIONAL; INTRAMUSCULAR; INTRAVENOUS; SOFT TISSUE
Status: DISCONTINUED | OUTPATIENT
Start: 2025-02-18 | End: 2025-02-18 | Stop reason: HOSPADM

## 2025-02-18 RX ORDER — CEFAZOLIN SODIUM/WATER 2 G/20 ML
2 SYRINGE (ML) INTRAVENOUS SEE ADMIN INSTRUCTIONS
Status: DISCONTINUED | OUTPATIENT
Start: 2025-02-18 | End: 2025-02-18 | Stop reason: HOSPADM

## 2025-02-18 RX ORDER — IBUPROFEN 600 MG/1
600 TABLET, FILM COATED ORAL EVERY 6 HOURS PRN
Status: DISCONTINUED | OUTPATIENT
Start: 2025-02-18 | End: 2025-02-19 | Stop reason: HOSPADM

## 2025-02-18 RX ORDER — PROPOFOL 10 MG/ML
INJECTION, EMULSION INTRAVENOUS CONTINUOUS PRN
Status: DISCONTINUED | OUTPATIENT
Start: 2025-02-18 | End: 2025-02-18

## 2025-02-18 RX ORDER — FENTANYL CITRATE 50 UG/ML
50 INJECTION, SOLUTION INTRAMUSCULAR; INTRAVENOUS EVERY 5 MIN PRN
Status: DISCONTINUED | OUTPATIENT
Start: 2025-02-18 | End: 2025-02-18 | Stop reason: HOSPADM

## 2025-02-18 RX ORDER — NALOXONE HYDROCHLORIDE 0.4 MG/ML
0.2 INJECTION, SOLUTION INTRAMUSCULAR; INTRAVENOUS; SUBCUTANEOUS
Status: DISCONTINUED | OUTPATIENT
Start: 2025-02-18 | End: 2025-02-18 | Stop reason: HOSPADM

## 2025-02-18 RX ORDER — METRONIDAZOLE 500 MG/100ML
500 INJECTION, SOLUTION INTRAVENOUS
Status: COMPLETED | OUTPATIENT
Start: 2025-02-18 | End: 2025-02-18

## 2025-02-18 RX ORDER — ONDANSETRON 4 MG/1
4 TABLET, ORALLY DISINTEGRATING ORAL EVERY 30 MIN PRN
Status: DISCONTINUED | OUTPATIENT
Start: 2025-02-18 | End: 2025-02-18 | Stop reason: HOSPADM

## 2025-02-18 RX ORDER — HYDROMORPHONE HYDROCHLORIDE 1 MG/ML
0.2 INJECTION, SOLUTION INTRAMUSCULAR; INTRAVENOUS; SUBCUTANEOUS
Status: DISCONTINUED | OUTPATIENT
Start: 2025-02-18 | End: 2025-02-19 | Stop reason: HOSPADM

## 2025-02-18 RX ORDER — SODIUM CHLORIDE, SODIUM LACTATE, POTASSIUM CHLORIDE, CALCIUM CHLORIDE 600; 310; 30; 20 MG/100ML; MG/100ML; MG/100ML; MG/100ML
INJECTION, SOLUTION INTRAVENOUS CONTINUOUS PRN
Status: DISCONTINUED | OUTPATIENT
Start: 2025-02-18 | End: 2025-02-18

## 2025-02-18 RX ORDER — DEXAMETHASONE SODIUM PHOSPHATE 10 MG/ML
INJECTION, SOLUTION INTRAMUSCULAR; INTRAVENOUS
Status: COMPLETED | OUTPATIENT
Start: 2025-02-18 | End: 2025-02-18

## 2025-02-18 RX ORDER — ONDANSETRON 2 MG/ML
4 INJECTION INTRAMUSCULAR; INTRAVENOUS EVERY 30 MIN PRN
Status: DISCONTINUED | OUTPATIENT
Start: 2025-02-18 | End: 2025-02-18 | Stop reason: HOSPADM

## 2025-02-18 RX ORDER — ACETAMINOPHEN 325 MG/1
975 TABLET ORAL EVERY 6 HOURS PRN
Qty: 50 TABLET | Refills: 0 | Status: SHIPPED | OUTPATIENT
Start: 2025-02-18

## 2025-02-18 RX ORDER — NALOXONE HYDROCHLORIDE 0.4 MG/ML
0.4 INJECTION, SOLUTION INTRAMUSCULAR; INTRAVENOUS; SUBCUTANEOUS
Status: DISCONTINUED | OUTPATIENT
Start: 2025-02-18 | End: 2025-02-18 | Stop reason: HOSPADM

## 2025-02-18 RX ORDER — OXYCODONE HYDROCHLORIDE 5 MG/1
5 TABLET ORAL
Status: DISCONTINUED | OUTPATIENT
Start: 2025-02-18 | End: 2025-02-18

## 2025-02-18 RX ORDER — MAGNESIUM SULFATE HEPTAHYDRATE 40 MG/ML
2 INJECTION, SOLUTION INTRAVENOUS ONCE
Status: DISCONTINUED | OUTPATIENT
Start: 2025-02-18 | End: 2025-02-18 | Stop reason: HOSPADM

## 2025-02-18 RX ORDER — BUPIVACAINE HYDROCHLORIDE 2.5 MG/ML
INJECTION, SOLUTION EPIDURAL; INFILTRATION; INTRACAUDAL
Status: COMPLETED | OUTPATIENT
Start: 2025-02-18 | End: 2025-02-18

## 2025-02-18 RX ORDER — LISDEXAMFETAMINE DIMESYLATE 20 MG/1
40 CAPSULE ORAL DAILY
Status: DISCONTINUED | OUTPATIENT
Start: 2025-02-19 | End: 2025-02-19 | Stop reason: HOSPADM

## 2025-02-18 RX ORDER — HALOPERIDOL 5 MG/ML
1 INJECTION INTRAMUSCULAR
Status: DISCONTINUED | OUTPATIENT
Start: 2025-02-18 | End: 2025-02-18 | Stop reason: HOSPADM

## 2025-02-18 RX ORDER — SODIUM CHLORIDE, SODIUM LACTATE, POTASSIUM CHLORIDE, CALCIUM CHLORIDE 600; 310; 30; 20 MG/100ML; MG/100ML; MG/100ML; MG/100ML
INJECTION, SOLUTION INTRAVENOUS CONTINUOUS
Status: DISCONTINUED | OUTPATIENT
Start: 2025-02-18 | End: 2025-02-18 | Stop reason: HOSPADM

## 2025-02-18 RX ORDER — HYDROMORPHONE HYDROCHLORIDE 1 MG/ML
0.4 INJECTION, SOLUTION INTRAMUSCULAR; INTRAVENOUS; SUBCUTANEOUS
Status: DISCONTINUED | OUTPATIENT
Start: 2025-02-18 | End: 2025-02-19 | Stop reason: HOSPADM

## 2025-02-18 RX ORDER — SCOPOLAMINE 1 MG/3D
1 PATCH, EXTENDED RELEASE TRANSDERMAL ONCE
Status: COMPLETED | OUTPATIENT
Start: 2025-02-18 | End: 2025-02-19

## 2025-02-18 RX ORDER — FLUMAZENIL 0.1 MG/ML
0.2 INJECTION, SOLUTION INTRAVENOUS
Status: DISCONTINUED | OUTPATIENT
Start: 2025-02-18 | End: 2025-02-18 | Stop reason: HOSPADM

## 2025-02-18 RX ORDER — IBUPROFEN 800 MG/1
800 TABLET, FILM COATED ORAL EVERY 6 HOURS PRN
Qty: 30 TABLET | Refills: 0 | Status: SHIPPED | OUTPATIENT
Start: 2025-02-18

## 2025-02-18 RX ORDER — HYDRALAZINE HYDROCHLORIDE 20 MG/ML
2.5-5 INJECTION INTRAMUSCULAR; INTRAVENOUS EVERY 10 MIN PRN
Status: DISCONTINUED | OUTPATIENT
Start: 2025-02-18 | End: 2025-02-18 | Stop reason: HOSPADM

## 2025-02-18 RX ORDER — FAMOTIDINE 20 MG/1
20 TABLET, FILM COATED ORAL 2 TIMES DAILY
Status: DISCONTINUED | OUTPATIENT
Start: 2025-02-18 | End: 2025-02-19 | Stop reason: HOSPADM

## 2025-02-18 RX ORDER — FENTANYL CITRATE 50 UG/ML
25 INJECTION, SOLUTION INTRAMUSCULAR; INTRAVENOUS EVERY 5 MIN PRN
Status: DISCONTINUED | OUTPATIENT
Start: 2025-02-18 | End: 2025-02-18 | Stop reason: HOSPADM

## 2025-02-18 RX ORDER — ONDANSETRON 2 MG/ML
INJECTION INTRAMUSCULAR; INTRAVENOUS PRN
Status: DISCONTINUED | OUTPATIENT
Start: 2025-02-18 | End: 2025-02-18

## 2025-02-18 RX ORDER — ACETAMINOPHEN 325 MG/1
975 TABLET ORAL ONCE
Status: COMPLETED | OUTPATIENT
Start: 2025-02-18 | End: 2025-02-18

## 2025-02-18 RX ORDER — ONDANSETRON 4 MG/1
4 TABLET, FILM COATED ORAL EVERY 8 HOURS PRN
Qty: 20 TABLET | Refills: 0 | Status: SHIPPED | OUTPATIENT
Start: 2025-02-18

## 2025-02-18 RX ORDER — NALOXONE HYDROCHLORIDE 0.4 MG/ML
0.4 INJECTION, SOLUTION INTRAMUSCULAR; INTRAVENOUS; SUBCUTANEOUS
Status: DISCONTINUED | OUTPATIENT
Start: 2025-02-18 | End: 2025-02-19 | Stop reason: HOSPADM

## 2025-02-18 RX ORDER — DEXMEDETOMIDINE HYDROCHLORIDE 4 UG/ML
INJECTION, SOLUTION INTRAVENOUS
Status: COMPLETED | OUTPATIENT
Start: 2025-02-18 | End: 2025-02-18

## 2025-02-18 RX ORDER — FENTANYL CITRATE 50 UG/ML
INJECTION, SOLUTION INTRAMUSCULAR; INTRAVENOUS PRN
Status: DISCONTINUED | OUTPATIENT
Start: 2025-02-18 | End: 2025-02-18

## 2025-02-18 RX ORDER — HYDROMORPHONE HYDROCHLORIDE 1 MG/ML
0.4 INJECTION, SOLUTION INTRAMUSCULAR; INTRAVENOUS; SUBCUTANEOUS EVERY 5 MIN PRN
Status: DISCONTINUED | OUTPATIENT
Start: 2025-02-18 | End: 2025-02-18 | Stop reason: HOSPADM

## 2025-02-18 RX ORDER — AMOXICILLIN 250 MG
1-2 CAPSULE ORAL 2 TIMES DAILY PRN
Qty: 30 TABLET | Refills: 0 | Status: SHIPPED | OUTPATIENT
Start: 2025-02-18

## 2025-02-18 RX ORDER — ONDANSETRON 2 MG/ML
4 INJECTION INTRAMUSCULAR; INTRAVENOUS EVERY 6 HOURS PRN
Status: DISCONTINUED | OUTPATIENT
Start: 2025-02-18 | End: 2025-02-19 | Stop reason: HOSPADM

## 2025-02-18 RX ORDER — IBUPROFEN 800 MG/1
800 TABLET, FILM COATED ORAL ONCE
Status: COMPLETED | OUTPATIENT
Start: 2025-02-19 | End: 2025-02-19

## 2025-02-18 RX ORDER — OXYCODONE HYDROCHLORIDE 5 MG/1
5 TABLET ORAL
Status: DISCONTINUED | OUTPATIENT
Start: 2025-02-18 | End: 2025-02-18 | Stop reason: HOSPADM

## 2025-02-18 RX ADMIN — SODIUM CHLORIDE, POTASSIUM CHLORIDE, SODIUM LACTATE AND CALCIUM CHLORIDE: 600; 310; 30; 20 INJECTION, SOLUTION INTRAVENOUS at 17:04

## 2025-02-18 RX ADMIN — FENTANYL CITRATE 25 MCG: 50 INJECTION INTRAMUSCULAR; INTRAVENOUS at 17:53

## 2025-02-18 RX ADMIN — DEXAMETHASONE SODIUM PHOSPHATE 2 MG: 10 INJECTION, SOLUTION INTRAMUSCULAR; INTRAVENOUS at 13:13

## 2025-02-18 RX ADMIN — OXYCODONE 5 MG: 5 TABLET ORAL at 19:58

## 2025-02-18 RX ADMIN — OXYCODONE HYDROCHLORIDE 10 MG: 10 TABLET ORAL at 23:53

## 2025-02-18 RX ADMIN — Medication 50 MG: at 14:01

## 2025-02-18 RX ADMIN — DEXMEDETOMIDINE HYDROCHLORIDE 4 MCG: 100 INJECTION, SOLUTION INTRAVENOUS at 16:45

## 2025-02-18 RX ADMIN — ONDANSETRON 4 MG: 2 INJECTION INTRAMUSCULAR; INTRAVENOUS at 16:50

## 2025-02-18 RX ADMIN — ACETAMINOPHEN 975 MG: 325 TABLET, FILM COATED ORAL at 20:01

## 2025-02-18 RX ADMIN — FAMOTIDINE 20 MG: 20 TABLET, FILM COATED ORAL at 21:05

## 2025-02-18 RX ADMIN — HYDROMORPHONE HYDROCHLORIDE 0.2 MG: 1 INJECTION, SOLUTION INTRAMUSCULAR; INTRAVENOUS; SUBCUTANEOUS at 19:13

## 2025-02-18 RX ADMIN — PROPOFOL 40 MG: 10 INJECTION, EMULSION INTRAVENOUS at 16:47

## 2025-02-18 RX ADMIN — FENTANYL CITRATE 100 MCG: 50 INJECTION INTRAMUSCULAR; INTRAVENOUS at 14:00

## 2025-02-18 RX ADMIN — PROPOFOL 150 MCG/KG/MIN: 10 INJECTION, EMULSION INTRAVENOUS at 14:05

## 2025-02-18 RX ADMIN — HYDROMORPHONE HYDROCHLORIDE 0.4 MG: 1 INJECTION, SOLUTION INTRAMUSCULAR; INTRAVENOUS; SUBCUTANEOUS at 21:05

## 2025-02-18 RX ADMIN — FENTANYL CITRATE 25 MCG: 50 INJECTION INTRAMUSCULAR; INTRAVENOUS at 17:39

## 2025-02-18 RX ADMIN — FENTANYL CITRATE 50 MCG: 0.05 INJECTION, SOLUTION INTRAMUSCULAR; INTRAVENOUS at 18:17

## 2025-02-18 RX ADMIN — HYDROMORPHONE HYDROCHLORIDE 0.25 MG: 1 INJECTION, SOLUTION INTRAMUSCULAR; INTRAVENOUS; SUBCUTANEOUS at 16:44

## 2025-02-18 RX ADMIN — FENTANYL CITRATE 50 MCG: 0.05 INJECTION, SOLUTION INTRAMUSCULAR; INTRAVENOUS at 18:07

## 2025-02-18 RX ADMIN — Medication 20 MG: at 15:07

## 2025-02-18 RX ADMIN — MIDAZOLAM 1 MG: 1 INJECTION INTRAMUSCULAR; INTRAVENOUS at 14:00

## 2025-02-18 RX ADMIN — TRANEXAMIC ACID 1 G: 10 INJECTION, SOLUTION INTRAVENOUS at 17:01

## 2025-02-18 RX ADMIN — DEXMEDETOMIDINE HYDROCHLORIDE 12 MCG: 100 INJECTION, SOLUTION INTRAVENOUS at 14:14

## 2025-02-18 RX ADMIN — METRONIDAZOLE 500 MG: 500 INJECTION, SOLUTION INTRAVENOUS at 13:26

## 2025-02-18 RX ADMIN — PROPOFOL 200 MG: 10 INJECTION, EMULSION INTRAVENOUS at 14:01

## 2025-02-18 RX ADMIN — SODIUM CHLORIDE, POTASSIUM CHLORIDE, SODIUM LACTATE AND CALCIUM CHLORIDE: 600; 310; 30; 20 INJECTION, SOLUTION INTRAVENOUS at 13:52

## 2025-02-18 RX ADMIN — DEXMEDETOMIDINE HYDROCHLORIDE 40 MCG: 100 INJECTION, SOLUTION INTRAVENOUS at 13:13

## 2025-02-18 RX ADMIN — Medication 2 G: at 14:11

## 2025-02-18 RX ADMIN — DEXMEDETOMIDINE HYDROCHLORIDE 4 MCG: 100 INJECTION, SOLUTION INTRAVENOUS at 15:30

## 2025-02-18 RX ADMIN — HYDROMORPHONE HYDROCHLORIDE 0.25 MG: 1 INJECTION, SOLUTION INTRAMUSCULAR; INTRAVENOUS; SUBCUTANEOUS at 17:21

## 2025-02-18 RX ADMIN — SCOPOLAMINE 1 PATCH: 1.5 PATCH, EXTENDED RELEASE TRANSDERMAL at 13:09

## 2025-02-18 RX ADMIN — DEXAMETHASONE SODIUM PHOSPHATE 8 MG: 4 INJECTION, SOLUTION INTRAMUSCULAR; INTRAVENOUS at 14:00

## 2025-02-18 RX ADMIN — ACETAMINOPHEN 975 MG: 325 TABLET, FILM COATED ORAL at 11:09

## 2025-02-18 RX ADMIN — MIDAZOLAM 1 MG: 1 INJECTION INTRAMUSCULAR; INTRAVENOUS at 13:52

## 2025-02-18 RX ADMIN — HYDROMORPHONE HYDROCHLORIDE 0.4 MG: 1 INJECTION, SOLUTION INTRAMUSCULAR; INTRAVENOUS; SUBCUTANEOUS at 18:37

## 2025-02-18 RX ADMIN — SUGAMMADEX 200 MG: 100 INJECTION, SOLUTION INTRAVENOUS at 17:13

## 2025-02-18 RX ADMIN — PHENAZOPYRIDINE HYDROCHLORIDE 200 MG: 200 TABLET ORAL at 13:12

## 2025-02-18 RX ADMIN — Medication 20 MG: at 15:46

## 2025-02-18 RX ADMIN — FENTANYL CITRATE 50 MCG: 50 INJECTION INTRAMUSCULAR; INTRAVENOUS at 13:19

## 2025-02-18 RX ADMIN — ACETAMINOPHEN 650 MG: 325 TABLET ORAL at 21:30

## 2025-02-18 RX ADMIN — MIDAZOLAM 1 MG: 1 INJECTION INTRAMUSCULAR; INTRAVENOUS at 13:19

## 2025-02-18 RX ADMIN — PHENYLEPHRINE HYDROCHLORIDE 50 MCG: 10 INJECTION INTRAVENOUS at 14:23

## 2025-02-18 RX ADMIN — BUPIVACAINE HYDROCHLORIDE 60 ML: 2.5 INJECTION, SOLUTION EPIDURAL; INFILTRATION; INTRACAUDAL; PERINEURAL at 13:13

## 2025-02-18 RX ADMIN — LIDOCAINE HYDROCHLORIDE 80 MG: 20 INJECTION, SOLUTION INFILTRATION; PERINEURAL at 14:00

## 2025-02-18 ASSESSMENT — ACTIVITIES OF DAILY LIVING (ADL)
ADLS_ACUITY_SCORE: 34
ADLS_ACUITY_SCORE: 34
ADLS_ACUITY_SCORE: 32
ADLS_ACUITY_SCORE: 34
ADLS_ACUITY_SCORE: 34
ADLS_ACUITY_SCORE: 32
ADLS_ACUITY_SCORE: 34
ADLS_ACUITY_SCORE: 32
ADLS_ACUITY_SCORE: 32
ADLS_ACUITY_SCORE: 34
ADLS_ACUITY_SCORE: 32

## 2025-02-18 ASSESSMENT — LIFESTYLE VARIABLES: TOBACCO_USE: 0

## 2025-02-18 NOTE — ANESTHESIA PREPROCEDURE EVALUATION
Anesthesia Pre-Procedure Evaluation    Patient: Elva Aaron   MRN: [id number] : 1995        Procedure : Procedure(s):  HYSTERECTOMY, TOTAL, LAPAROSCOPIC, WITH SALPINGO-OOPHORECTOMY; Possible Cystoscopy, Exam Under Anesthesia - Robot Assited          Past Medical History:   Diagnosis Date    PONV (postoperative nausea and vomiting)       Past Surgical History:   Procedure Laterality Date    ADENOIDECTOMY      COSMETIC PLACEMENT OF DENTAL IMPLANT(S)      HC TOOTH EXTRACTION W/FORCEP      MASTECTOMY SIMPLE BILATERAL Bilateral 2021    Procedure: MASTECTOMY, BILATERAL, SIMPLE, WITH nipple grafts. OnQ;  Surgeon: Lashanda Mulligan MD;  Location: [facility] OR    TONSILLECTOMY        No Known Allergies   Social History     Tobacco Use    Smoking status: Never    Smokeless tobacco: Never   Substance Use Topics    Alcohol use: Yes     Comment: Never more than one, and goes weeks without.      Wt Readings from Last 1 Encounters:   25 92.9 kg (204 lb 12.9 oz)        Anesthesia Evaluation   Pt has had prior anesthetic. Type: General.    History of anesthetic complications  - PONV.      ROS/MED HX  ENT/Pulmonary:    (-) tobacco use and asthma   Neurologic:     (+)      migraines,                          Cardiovascular:  - neg cardiovascular ROS     METS/Exercise Tolerance:     Hematologic:       Musculoskeletal:       GI/Hepatic:     (+) GERD, Asymptomatic on medication,                  Renal/Genitourinary:       Endo:     (+)               Obesity,       Psychiatric/Substance Use: Comment: Gender dysphoria   ADHD    (+) psychiatric history depression       Infectious Disease:       Malignancy:       Other:            Physical Exam    Airway        Mallampati: II   TM distance: > 3 FB   Neck ROM: full   Mouth opening: > 3 cm    Respiratory Devices and Support         Dental       (+) Minor Abnormalities - some fillings, tiny chips      Cardiovascular          Rhythm and rate: regular and normal  "    Pulmonary           breath sounds clear to auscultation           OUTSIDE LABS:  CBC:   Lab Results   Component Value Date    WBC 7.1 02/05/2025    WBC 8.8 03/25/2024    HGB 14.1 02/05/2025    HGB 13.9 09/20/2024    HCT 41.9 02/05/2025    HCT 44.3 09/20/2024     02/05/2025     03/25/2024     BMP:   Lab Results   Component Value Date     03/25/2024     10/03/2022    POTASSIUM 4.4 03/25/2024    POTASSIUM 3.6 10/03/2022    CHLORIDE 104 03/25/2024    CHLORIDE 109 10/03/2022    CO2 23 03/25/2024    CO2 27 10/03/2022    BUN 6.2 03/25/2024    BUN 7 10/03/2022    CR 0.73 03/25/2024    CR 0.62 10/03/2022    GLC 90 03/25/2024    GLC 93 10/03/2022     COAGS: No results found for: \"PTT\", \"INR\", \"FIBR\"  POC: No results found for: \"BGM\", \"HCG\", \"HCGS\"  HEPATIC:   Lab Results   Component Value Date    ALBUMIN 4.2 09/20/2024    PROTTOTAL 7.8 03/25/2024    ALT 16 03/25/2024    AST 19 03/25/2024    ALKPHOS 90 03/25/2024    BILITOTAL 0.2 03/25/2024     OTHER:   Lab Results   Component Value Date    ROXANNE 9.4 03/25/2024    TSH 1.26 03/25/2024    T4 1.09 11/12/2021       Anesthesia Plan    ASA Status:  2    NPO Status:  NPO Appropriate    Anesthesia Type: General.     - Airway: ETT   Induction: Intravenous, Propofol.   Maintenance: TIVA.   Techniques and Equipment:     - Lines/Monitors: BIS, 2nd IV     Consents    Anesthesia Plan(s) and associated risks, benefits, and realistic alternatives discussed. Questions answered and patient/representative(s) expressed understanding.     - Discussed:     - Discussed with:  Patient      - Extended Intubation/Ventilatory Support Discussed: No.      - Patient is DNR/DNI Status: No     Use of blood products discussed: No .     Postoperative Care    Pain management: IV analgesics, Oral pain medications, Multi-modal analgesia, Peripheral nerve block (Single Shot).   PONV prophylaxis: Ondansetron (or other 5HT-3), Dexamethasone or Solumedrol, Scopolamine patch, Background " "Propofol Infusion     Comments:               GANESH GARCIA MD    I have reviewed the pertinent notes and labs in the chart from the past 30 days and (re)examined the patient.  Any updates or changes from those notes are reflected in this note.    Clinically Significant Risk Factors Present on Admission                             # Obesity: Estimated body mass index is 33.55 kg/m  as calculated from the following:    Height as of this encounter: 1.664 m (5' 5.51\").    Weight as of this encounter: 92.9 kg (204 lb 12.9 oz).                "

## 2025-02-18 NOTE — PROGRESS NOTES
GynecologyPostoperative Progress Note  2/18/2025    S: Patient reports he is doing well postoperatively. Pain is well controlled with oral medications so far. Most of the discomfort is from the vaginal packing and Hendrix catheter. Tolerating PO without nausea or vomiting. Denies dizziness, chest pain, SOB, or other concerns.     O:  Vitals:    02/18/25 1915 02/18/25 1930 02/18/25 2000 02/18/25 2032   BP: 114/78 117/77 119/87 110/71   BP Location:    Right arm   Patient Position:    Semi-Morris's   Cuff Size:    Adult Regular   Pulse: 84 97 95    Resp: 11 12 16    Temp:  99  F (37.2  C)  98.1  F (36.7  C)   TempSrc:  Oral  Oral   SpO2: 98% 97% 99%    Weight:       Height:         Gen: NAD  Cardio: RRR, no murmurs  Resp: CTAB, non-labored breathing  Abdomen: Soft, appropriately tender, incisions c/d/i covered with Dermabond    A&P: 29 year old POD#0 s/p RA-TLH, BSO, cystoscopy, vaginal laceration repair. Doing well in early postoperative period.    FEN: Regular diet as tolerated  Pain: Tylenol, ibuprofen, and PRN oxycodone  Heme: Hgb 14.1> > 12.9  Resp: IS  GI: Famotidine (PTA med), PRN tums  : Hendrix in place, vaginal packing in place  PPx: SCDs, ambulating as tolerated    Dispo: Anticipate discharge home tomorrow.    Melissa Fuentes MD  OB/GYN PGY-3  02/18/2025 10:15 PM      This patient is on the GYNECOLOGY Team.    To reach the RESIDENT PHYSICIAN responsible for this patient, use the following pagers:    -DAY (630 AM- 6 PM), page [phone]  -NIGHT (6PM-6:30 AM), page [phone]   -WEEKEND (FRI 6PM to SUN 6PM), page [phone]    MONDAY AFTERNOONS (1PM-5PM), please page the attending directly

## 2025-02-18 NOTE — ANESTHESIA CARE TRANSFER NOTE
Patient: Elva Aaron    Procedure: Procedure(s):  HYSTERECTOMY, TOTAL, LAPAROSCOPIC, WITH SALPINGO-OOPHORECTOMY;  Cystoscopy, Exam Under Anesthesia - Robot Assited, vaginal laceration repair and vaginal packing       Diagnosis: Encounter for gender affirmation procedure [Z41.8]  Diagnosis Additional Information: No value filed.    Anesthesia Type:   General     Note:    Oropharynx: oropharynx clear of all foreign objects  Level of Consciousness: drowsy  Oxygen Supplementation: face mask  Level of Supplemental Oxygen (L/min / FiO2): 6  Independent Airway: airway patency satisfactory and stable  Dentition: dentition unchanged  Vital Signs Stable: post-procedure vital signs reviewed and stable  Report to RN Given: handoff report given  Patient transferred to: PACU    Handoff Report: Identifed the Patient, Identified the Reponsible Provider, Reviewed the pertinent medical history, Discussed the surgical course, Reviewed Intra-OP anesthesia mangement and issues during anesthesia, Set expectations for post-procedure period and Allowed opportunity for questions and acknowledgement of understanding      Vitals:  Vitals Value Taken Time   /76 02/18/25 1730   Temp 98.6    Pulse 78 02/18/25 1734   Resp 18 02/18/25 1734   SpO2 100 % 02/18/25 1734   Vitals shown include unfiled device data.    Electronically Signed By: EMILIANO Gonzales CRNA  February 18, 2025  5:35 PM

## 2025-02-18 NOTE — BRIEF OP NOTE
[facility]    Brief Operative Note    Pre-operative diagnosis: Encounter for gender affirmation procedure [Z41.8]  Post-operative diagnosis Same as pre-operative diagnosis    Procedure: HYSTERECTOMY, TOTAL, LAPAROSCOPIC, WITH SALPINGO-OOPHORECTOMY;  Cystoscopy, Exam Under Anesthesia - Robot Assisted, Vaginal Laceration Repair, N/A - Abdomen    Surgeon: Surgeons and Role:     * Sandra Cruz MD - Primary     * Kevin Lopez MD - Resident - Assisting     * Stevenson Harrison MD - Resident - Assisting  Anesthesia: General   Estimated Blood Loss: 200 ml  IVF: 1000ml   UOP:200 pyridium stained    Drains: None  Vaginal packing x1 placed    Specimens:   ID Type Source Tests Collected by Time Destination   1 :  Tissue Uterus, Cervix, Bilateral Fallopian Tubes & Ovaries SURGICAL PATHOLOGY EXAM Sandra Cruz MD 2/18/2025  4:01 PM      Findings: EUA revealed small, anteverted mobile uterus, no adnexal masses palpated. Normal appearing external genitalia. Per laparoscopy, normal appearing uterus noted with normal appearing bilateral fallopian tubes and ovaries.  Laparoscopic abdominal survey revealed normal appearing appendix, omentum, liver and bowel.  Bilateral ureteral efflux seen on cystoscopy with no evidence of bladder injury. At conclusion of case, anterior vaginal laceration noted, stitch placed at this site with 0 Vicryl. Ongoing general oozing noted to right and left walls of vagina. Due to this decision was made to place vaginal packing x1. Hendrix was replaced.    Complications: None    Dr. Cruz was present and scrubbed for the entire case. Full operative note to follow.     Kevin Lopez DO, MS  Obstetrics, Gynecology & Women's Health   Resident, PGY-4  02/18/2025 5:07 PM

## 2025-02-18 NOTE — ANESTHESIA PROCEDURE NOTES
Airway       Patient location during procedure: OR       Procedure Start/Stop Times: 2/18/2025 2:05 PM  Staff -        CRNA: Melissa Delaney APRN CRNA       Performed By: CRNAIndications and Patient Condition       Indications for airway management: kim-procedural       Induction type:intravenous       Mask difficulty assessment: 1 - vent by mask    Final Airway Details       Final airway type: endotracheal airway       Successful airway: ETT - single and Oral  Endotracheal Airway Details        ETT size (mm): 7.0       Cuffed: yes       Cuff volume (mL): 8       Successful intubation technique: direct laryngoscopy       DL Blade Type: MAC 3       Grade View of Cords: 1       Adjucts: stylet       Position: Right       Measured from: gums/teeth       Secured at (cm): 21       Bite block used: None    Post intubation assessment        Placement verified by: capnometry, equal breath sounds and chest rise        Number of attempts at approach: 1       Number of other approaches attempted: 0       Secured with: tape       Ease of procedure: easy       Dentition: Intact and Unchanged    Medication(s) Administered   Medication Administration Time: 2/18/2025 2:05 PM

## 2025-02-18 NOTE — OP NOTE
FULL GYNECOLOGY OPERATION NOTE    Name: Elva Aaron (Zed)  MRN: [id number]  Date of Surgery: 2025    Surgeon: Sandra Cruz MD    Assistants:   - Kevin Lopez DO, MS PGY-4  - Stevenson Harrison, PGY-1    Pre-operative Diagnoses: Encounter for gender affirmation hysterectomy    Post-operative Diagnoses: Same as above, s/p procedure below    Procedure: Robotic-Assisted, Total Laparoscopic Hysterectomy, Bilateral Salpingo-oophorectomy, Cystoscopy, Vaginal laceration repair    Anesthesia: GETA    EBL: 200cc  IVF: 1000cc  Urine: 200cc    Complications: None apparent    Findings: EUA revealed small, anteverted mobile uterus, no adnexal masses palpated. Normal appearing external genitalia. Per laparoscopy, normal appearing uterus noted with normal appearing bilateral fallopian tubes and ovaries.  Laparoscopic abdominal survey revealed normal appearing appendix, omentum, liver and bowel.  Bilateral ureteral efflux seen on cystoscopy with no evidence of bladder injury. At conclusion of case, left lateral vaginal laceration noted, stitch placed at this site with 0 Vicryl. Ongoing general oozing noted to right and left walls of vagina. Due to this decision was made to place vaginal packing x1. Hendrix was replaced.     Specimens:     ID Type Source Tests Collected by Time Destination   1 :  Tissue Uterus, Cervix, Bilateral Fallopian Tubes & Ovaries SURGICAL PATHOLOGY EXAM Sandra Cruz MD 2025  4:01 PM          Indications:  Elder Aaron is a 29 year old  who presented for gender affirmation procedure. Patient has been on gender affirming hormone therapy for two years. Was seen in clinic and counseled on all aspects of gender affirmation procedure. He ultimately decided to proceed with RaTLH, BSO and cystoscopy.  Risks, benefits and alternatives to above stated procedure were discussed. Patient desired to proceed and written informed consent was obtained prior to proceeding.   Procedure  Details: The patient was taken to the operating room where SCDs were placed. He received prophylactic antibiotics. General anesthetic was obtained without difficulty. She was placed in dorsal litothomy position with legs in yellow fin stirrups, in what was felt to be a neurologically safe position. Exam under anesthesia was performed with findings as described above. The patient was prepped and draped in the usual sterile fashion.   Safety timeout was completed. A medium issac speculum was placed in the vagina. The anterior lip of the cervix was grasped with a single tooth tenaculum and the uterus sounded, the cervix was measured and the appropriate JULIANA uterine manipulator was selected. The uterine manipulator was then assembled and inserted without difficulty. The speculum was removed and a gandhi catheter was placed.   Attention was then turned to the upper abdomen after gloves were changed. A horizontal incision was made at superior edge of the umbilicus. The umbilicus was then elevated with two penetrating towel clamps and the Veress needle introduced through the incision. Attempt x 2 was made to gain entry into the abdomen with Veress needle without success. Decision was then made to perform open entry. The umbilical incision was expanded to 10mm. Dissection was carried through the skin and subcutaneous tissue. The fascia was identified, grasped with Kocher clamps and incised. The peritoneal cavity was then entered in an open fashion without difficulty. A Barth attachment was placed on the davinci trocar and it was placed through this incision and the abdominal cavity was insufflated to a pneumoperitoneum of 15mmHg. The 8mm, 0 Degree DaVinci camera was then placed through this trochar and the abdominal cavity viewed in its entirety with findings as noted above. At this point, the patient was placed into steep Trendelenburg. All additional ports were placed under direct visualization without any injuries  noted. An avascular area 3 cm superior and medial to the anterior superior iliac spine (ASIS) was identified in the RLQ and a 8 mm DaVinci port placed. A  8mm assistant port was placed ~ 8 cm lateral to umbilical port in the left mid abdomen. A 3rd, 8 mm da Misty port was placed in LLQ approximately  3 cm medial and superior to ASIS. The pelvis and upper abdomen were inspected with findings as noted above.  The Tegoinci robot was docked and instruments were inserted with monopolar cautery in the right arm and fenestrated bipolar in left arm.   Attention was turned to the left pelvic wall. The left ureter was easily identified and noted to be remote from area of operation. The left IP was identified, isolated and  cauterized and cut. The dissection was continued beyond the ovary and beneath the fallopian tube to the level of the cornua.  The left round ligament was then elevated and cauterized in the mid portion.  The anterior and posterior leaf of the broad ligament were dissected to the level of the randee cup using serial bites of the broad ligament down to the level of the cardinal ligaments and uterosacral ligaments posteriorly. The bladder was dissected caudally away from the intended colpotomy.  The left uterine vessels were then isolated, coagulated and cut.  Similar steps were carried out on the right after identifying the right ureter, away from the operative site.  The monopolar scissors were then used to incise the peritoneum posteriorly until the blue cup of the uterine manipulator was reached, creating the colpotomy. This incision was extended circumferentially around the cervix until the uterus was free of all its attachments. The uterus, cervix, bilateral fallopian tubes and ovaries were then removed through the vagina. A vaginal balloon was inserted to maintain adequate insufflation. The suture was then introduced into the abdomen via the assistant port. The vaginal cuff was closed laparoscopically with  a 0 V-loc suture using robot assist.  This was completed from right to left, running the suture back a single stitch. The vaginal cuff was irrigated and noted to be hemostatic. Surgical excision sites were examined and noted to be hemostatic.  Right and left ureters were again visualized and noted to be remote from operative areas. A cystoscopy was then performed with brisk efflux from bilateral ureteral orifices, and no evidence of damage to the bladder.  The robot was then undocked and instruments removed.  The umbilical port fascia was then closed with 0 vicryl using a viola contreras device.  The  remaining ports were removed and pneumoperitoneum released. The skin incisions were reapproximated with 4-0 Monocryl and overlying dermabond. Vaginal inspection revealed an intact vaginal cuff, but brisk bleeding  was noted. A speculum exam revealed a laceration at the left vaginal sidewall, near the cuff apex. A figure of X stitch with 0 vicryl was placed at this site. Upon further inspection, there was noted to be areas of bleeding from the right and left vaginal walls- which appeared diffuse and likely due to significant atrophy preoperatively- which was disrupted by the speculum and uterine manipulator. Attempts were made to control bleeding with pressure and minimal cautery without success. Decision was then made to administer TXA to patient, and place a single roll of vaginal packing into the vagina with plans to admit and monitor the patient overnight. The gandhi was replaced. Sponge, instrument and needle counts were correct x2. The patient tolerated the procedure well and was taken to PACU in stable condition.   Dr. Cruz was present and scrubbed throughout the entire procedure.    Kevin Lopez DO, MS  Obstetrics, Gynecology & Women's Health   Resident, PGY-4  02/18/2025 8:29 PM    Staff:  I was scrubbed and present for entire case and agree w/ above note.    Sandra Cruz MD

## 2025-02-18 NOTE — ANESTHESIA PROCEDURE NOTES
TAP Procedure Note    Pre-Procedure   Staff -        Anesthesiologist:  Jamila Fregoso MD       Performed By: anesthesiologist       Location: pre-op       Procedure Start/Stop Times: 2/18/2025 1:13 PM and 2/18/2025 1:26 PM       Pre-Anesthestic Checklist: patient identified, IV checked, site marked, risks and benefits discussed, informed consent, monitors and equipment checked, pre-op evaluation, at physician/surgeon's request and post-op pain management  Timeout:       Correct Patient: Yes        Correct Procedure: Yes        Correct Site: Yes        Correct Position: Yes        Correct Laterality: Yes        Site Marked: Yes  Procedure Documentation  Procedure: TAP         Diagnosis: POST OPERATIVE PAIN       Laterality: bilateral       Patient Position: supine       Patient Prep/Sterile Barriers: sterile gloves, mask       Skin prep: Chloraprep       Needle Type: short bevel       Needle Gauge: 21.        Needle Length (millimeters): 110        Ultrasound guided       1. Ultrasound was used to identify targeted nerve, plexus, vascular marker, or fascial plane and place a needle adjacent to it in real-time.       2. Ultrasound was used to visualize the spread of anesthetic in close proximity to the above referenced structure.       3. A permanent image is entered into the patient's record.    Assessment/Narrative         The placement was negative for: blood aspirated, painful injection and site bleeding       Paresthesias: No.       Bolus given via needle..        Secured via.        Insertion/Infusion Method: Single Shot       Complications: none       Injection made incrementally with aspirations every 5 mL.    Medication(s) Administered   Bupivacaine 0.25% PF (Infiltration) - Infiltration   60 mL - 2/18/2025 1:13:00 PM  Dexmedetomidine 4 mcg/mL (Perineural) - Perineural   40 mcg - 2/18/2025 1:13:00 PM  Dexamethasone 10 mg/mL PF (Perineural) - Perineural   2 mg - 2/18/2025 1:13:00 PM  Medication  "Administration Time: 2/18/2025 1:13 PM     Comments:  Discussed risks of nerve block, including nerve injury, bleeding, infection. Discussed anticipated incomplete analgesia. Discussed alternative of not performing a nerve block. Ensured understanding, invited questions and all questions were answered. Patient wishes to proceed.    Informed consent was obtained.   4 quadrant transversus abdominis plane block. Patient tolerated well. Incremental aspiration every 5 mL. No paresthesia, no heme. Needle tip visualized throughout with appropriate spread of local anesthetic in fascial planes bilaterally.   Block was placed at the surgeon's request for post operative pain control.          FOR [facility] [place] ONLY:   Pain Team Contact information: please page the Pain Team Via PrePayMe. Search \"Pain\". During daytime hours, please page the attending first. At night please page the resident first.      "

## 2025-02-19 VITALS
RESPIRATION RATE: 18 BRPM | SYSTOLIC BLOOD PRESSURE: 119 MMHG | DIASTOLIC BLOOD PRESSURE: 75 MMHG | TEMPERATURE: 98.5 F | BODY MASS INDEX: 33.3 KG/M2 | HEIGHT: 66 IN | WEIGHT: 207.23 LBS | OXYGEN SATURATION: 96 % | HEART RATE: 96 BPM

## 2025-02-19 LAB — HGB BLD-MCNC: 12.9 G/DL (ref 11.7–17.7)

## 2025-02-19 PROCEDURE — 250N000013 HC RX MED GY IP 250 OP 250 PS 637

## 2025-02-19 PROCEDURE — 36415 COLL VENOUS BLD VENIPUNCTURE: CPT | Performed by: OBSTETRICS & GYNECOLOGY

## 2025-02-19 PROCEDURE — 85018 HEMOGLOBIN: CPT | Performed by: OBSTETRICS & GYNECOLOGY

## 2025-02-19 PROCEDURE — 96376 TX/PRO/DX INJ SAME DRUG ADON: CPT

## 2025-02-19 PROCEDURE — 250N000011 HC RX IP 250 OP 636: Mod: JW | Performed by: OBSTETRICS & GYNECOLOGY

## 2025-02-19 PROCEDURE — 250N000013 HC RX MED GY IP 250 OP 250 PS 637: Performed by: OBSTETRICS & GYNECOLOGY

## 2025-02-19 PROCEDURE — G0378 HOSPITAL OBSERVATION PER HR: HCPCS

## 2025-02-19 RX ADMIN — IBUPROFEN 800 MG: 800 TABLET, FILM COATED ORAL at 03:35

## 2025-02-19 RX ADMIN — FAMOTIDINE 20 MG: 20 TABLET, FILM COATED ORAL at 08:09

## 2025-02-19 RX ADMIN — LISDEXAMFETAMINE DIMESYLATE 40 MG: 20 CAPSULE ORAL at 08:09

## 2025-02-19 RX ADMIN — OXYCODONE 5 MG: 5 TABLET ORAL at 08:09

## 2025-02-19 RX ADMIN — ACETAMINOPHEN 650 MG: 325 TABLET ORAL at 12:41

## 2025-02-19 RX ADMIN — HYDROMORPHONE HYDROCHLORIDE 0.4 MG: 1 INJECTION, SOLUTION INTRAMUSCULAR; INTRAVENOUS; SUBCUTANEOUS at 03:29

## 2025-02-19 RX ADMIN — ACETAMINOPHEN 975 MG: 325 TABLET ORAL at 03:34

## 2025-02-19 ASSESSMENT — ACTIVITIES OF DAILY LIVING (ADL)
ADLS_ACUITY_SCORE: 34

## 2025-02-19 NOTE — PROGRESS NOTES
[facility]  Gynecology Progress Note      Subjective:  Patient is doing well this morning. Reports that he did well overnight. Pain is controlled with PO medications. He ate dinner last night without any nausea or vomiting. Had minimal bleeding overnight.  Has only ambulating around room ambulating without dizziness or difficulty. He denies chest pain, shortness of breath, or other systemic complaints.    Objective:  Patient Vitals for the past 24 hrs:   BP Temp Temp src Pulse Resp SpO2 Height Weight   02/19/25 0701 -- -- -- -- -- -- -- 94 kg (207 lb 3.7 oz)   02/19/25 0304 115/73 98.5  F (36.9  C) Oral -- 16 -- -- --   02/18/25 2200 98/58 -- -- 96 15 96 % -- --   02/18/25 2100 115/85 -- -- 94 13 94 % -- --   02/18/25 2032 110/71 98.1  F (36.7  C) Oral -- -- -- -- --   02/18/25 2000 119/87 -- -- 95 16 99 % -- --   02/18/25 1930 117/77 99  F (37.2  C) Oral 97 12 97 % -- --   02/18/25 1915 114/78 -- -- 84 11 98 % -- --   02/18/25 1900 108/76 -- -- 81 10 96 % -- --   02/18/25 1837 -- -- -- 85 14 98 % -- --   02/18/25 1830 105/72 99.9  F (37.7  C) Oral 89 10 99 % -- --   02/18/25 1827 -- -- -- 81 10 100 % -- --   02/18/25 1817 -- -- -- 71 10 100 % -- --   02/18/25 1815 104/75 -- -- 69 10 -- -- --   02/18/25 1807 -- -- -- 78 13 100 % -- --   02/18/25 1800 105/74 -- -- 83 10 -- -- --   02/18/25 1745 107/74 -- -- 64 19 100 % -- --   02/18/25 1739 -- -- -- 83 18 100 % -- --   02/18/25 1730 112/76 98.5  F (36.9  C) Axillary 82 18 100 % -- --   02/18/25 1335 106/72 -- -- 101 18 100 % -- --   02/18/25 1330 112/78 -- -- 104 28 100 % -- --   02/18/25 1325 104/69 -- -- 81 21 100 % -- --   02/18/25 1320 114/73 -- -- 83 14 100 % -- --   02/18/25 1319 109/80 -- -- 72 13 100 % -- --   02/18/25 1315 109/80 -- -- 84 24 100 % -- --   02/18/25 1310 105/74 -- -- 79 14 99 % -- --   02/18/25 1305 117/68 -- -- -- -- 100 % -- --   02/18/25 1123 108/78 -- -- -- -- -- -- --   02/18/25 1039 (!) 160/133 98.4  F (36.9  " C) Oral 86 16 100 % 1.664 m (5' 5.51\") 92.9 kg (204 lb 12.9 oz)     Gen: Resting comfortably, NAD  CV: Regular rate, appears well perfused  Pulm: Non labored breathing on room air  Abd: Soft, appropriately ttp, non-distended   Inc: Laparoscopic incisions are clean, dry, intact. No induration or surrounding erythema  : Maxi pad with moderate amount of dried blood. Vaginal packing in place. Vaginal packing x1 removed, patient tolerated well. No evidence of ongoing active bleeding.   Ext: Trace LE edema bilaterally    A/P:  Elva Aaron is a 29 year old on POD #1 s/p TLH, BSO, cysto, vaginal laceration repair, who is doing well postoperatively. Vaginal packing placed after case due to diffuse vaginal bleeding/abrasion. Packing removed this morning, patient tolerated well. Hendrix also removed. Will observe patient/bleeding throughout this morning and likely discharge home if stable.     Routine post-operative goals: Encourage ambulation and spirometry  FEN: Advance diet as tolerated. Wean IVF when tolerating PO  CV: BP & pulse within normal limits  :  Hendrix in place, removed this morning.  GI:  Tolerating PO intake. Continue anti-emetics and stool softeners as needed.  Heme: Hgb 14.1 > EBL > 12.9   PPX: SCDs in place while in bed, encourage ambulation  Pain: Continue on medications - will discharge home with ibuprofen, tylenol and Oxycodone.     Dispo: Discharge home later today.    Kevin Lopez DO, MS  Obstetrics, Gynecology & Women's Health   Resident, PGY-4  02/19/2025 7:45 AM    [facility] staff:  Appreciate note by Dr. Lopez.  I have seen and examined the patient without the resident. I have reviewed, edited, and agree with the note.    My findings are: VSS. Abd soft. Hgb pending. Will likely discharge this PM.     Sandra Cruz MD, FACOG  2/19/2025  7:51 AM      "

## 2025-02-19 NOTE — PLAN OF CARE
Goal Outcome Evaluation:         VS: VSS, telemetry discontinued    O2: Stable on RA    Output: Voiding without difficulty, PVR 64   Last BM: Before surgery    Activity: SBA     Skin: 4 incisions to lower ABD, DCI no drainage VARUN    Pain: Managed with PRN tylenol and oxycodone    CMS: A&O x4    Dressing: None    Diet: Regular, good appetite, denies N/V    LDA: 2x PIV removed    Equipment: IV pole and pump    Plan: Discharging home today    Additional Info: Pt. discharged at 1430 to home, and left with personal belongings. Pt. received complete discharge paperwork and  medications as filled by discharge pharmacy. Pt received and signed for the narcotic medication. Pt. was given times of last dose for all discharge medications in writing on discharge medication sheets. Discharge teaching included  medication, pain management, activity restrictions, dressing changes, and signs and symptoms of infection. Dressing supplies sent home. Pt. had no further questions at the time of discharge and no unmet needs were identified.

## 2025-02-19 NOTE — PHARMACY-ADMISSION MEDICATION HISTORY
Pharmacist Admission Medication History    Admission medication history is complete. The information provided in this note is only as accurate as the sources available at the time of the update.    Information Source(s): [email or web address] via N/A    Pertinent Information: Last dose times entered by Preop nursing staff.    Changes made to PTA medication list:  Added: None  Deleted: None  Changed: None    Allergies reviewed with patient and updates made in EHR: no    Medication History Completed By: Tim Carlson RPH 2/18/2025 8:35 PM    PTA Med List   Medication Sig Last Dose/Taking    acetaminophen (TYLENOL) 325 MG tablet Take 3 tablets (975 mg) by mouth every 6 hours as needed for mild pain. Taking As Needed    acetaminophen (TYLENOL) 325 MG tablet Take 325-650 mg by mouth every 6 hours as needed for mild pain 2/18/2025 at 11:10 AM    fluticasone (FLONASE) 50 MCG/ACT nasal spray Spray 1 spray into both nostrils daily More than a month    ibuprofen (ADVIL/MOTRIN) 200 MG capsule Take 200 mg by mouth every 4 hours as needed for fever  Past Week    ibuprofen (ADVIL/MOTRIN) 800 MG tablet Take 1 tablet (800 mg) by mouth every 6 hours as needed for other (mild and/or inflammatory pain). Taking As Needed    lisdexamfetamine (VYVANSE) 40 MG capsule Take 1 capsule (40 mg) by mouth daily. 2/17/2025    loratadine (CLARITIN) 10 MG tablet Take 1 tablet (10 mg) by mouth daily More than a month    ondansetron (ZOFRAN) 4 MG tablet Take 1 tablet (4 mg) by mouth every 8 hours as needed for nausea. Taking As Needed    oxyCODONE (ROXICODONE) 5 MG tablet Take 1 tablet (5 mg) by mouth every 6 hours as needed for severe pain. Taking As Needed    senna-docusate (SENOKOT-S/PERICOLACE) 8.6-50 MG tablet Take 1-2 tablets by mouth 2 times daily as needed for constipation (While on oral opioids.). Taking As Needed    SUMAtriptan (IMITREX) 25 MG tablet Take 25 mg by mouth at onset of headache for migraine More than a month     testosterone cypionate (DEPOTESTOSTERONE) 200 MG/ML injection Inject 0.5 mLs (100 mg) subcutaneously once a week. Past Week

## 2025-02-19 NOTE — OR NURSING
"PACU to Inpatient Nursing Handoff    Patient Elva Aaron is a 29 year old adult who speaks English.   Procedure Procedure(s):  HYSTERECTOMY, TOTAL, LAPAROSCOPIC, WITH SALPINGO-OOPHORECTOMY;  Cystoscopy, Exam Under Anesthesia - Robot Assited, vaginal laceration repair and vaginal packing   Surgeon(s) Primary: Sandra Cruz MD  Resident - Assisting: Kevin Lopez MD; Stevenson Harrison MD     No Known Allergies    Isolation  No active isolations     Past Medical History   has a past medical history of PONV (postoperative nausea and vomiting).    Anesthesia General   Dermatome Level     Preop Meds acetaminophen (Tylenol) - time given: 1109  phenazopyridine (Pyridium) - time given: 1312  scopolamine patch   Nerve block Transversus abdominus plane (TAP).  Location:bilateral. Med:bupivacaine. Time given: 1313   Intraop Meds dexamethasone (Decadron)  dexmedetomidine (Precedex): 20 mcg total  fentanyl (Sublimaze): 100 mcg total  hydromorphone (Dilaudid): 0.5 mg total  ondansetron (Zofran): last given at 1650  TXA 1gm IV at 1701   Local Meds No   Antibiotics cefazolin (Ancef) - last given at 1411  metronidazol (Flagyl) - last given at 1326     Pain Patient Currently in Pain: yes (Patient reports pain as \"better\".)   PACU meds  fentanyl (Sublimaze): 150 mcg (total dose) last given at 1817   hydromorphone (Dilaudid): 0.4 mg (total dose) last given at 1837   Diluadid 0.2mg at 1913  Tylenol 975mg po and 5mg oxycodone at 2000   PCA / epidural No   Capnography  42     Telemetry ECG Rhythm: Normal sinus rhythm   Inpatient Telemetry Monitor Ordered? No        Labs Glucose Lab Results   Component Value Date    GLC 90 03/25/2024    GLC 93 10/03/2022    GLC 93.7 03/19/2021       Hgb Lab Results   Component Value Date    HGB 14.1 02/05/2025    HGB 14.3 03/19/2021       INR No results found for: \"INR\"   PACU Imaging Not applicable     Wound/Incision Incision/Surgical Site 02/18/25 Umbilicus (Active)   Incision " Assessment WDL 02/18/25 1800   Closure Liquid bandage 02/18/25 1800   Incision Drainage Amount None 02/18/25 1800   Dressing Intervention Open to air / No Dressing 02/18/25 1800   Number of days: 0       Incision/Surgical Site 02/18/25 Vagina (Active)   Incision Assessment UTV 02/18/25 1800   Closure KIM 02/18/25 1800   Incision Drainage Amount Scant 02/18/25 1800   Dressing Intervention Other (Comment) 02/18/25 1644   Number of days: 0       Incision/Surgical Site 02/18/25 Lower;Right Abdomen (Active)   Incision Assessment WDL 02/18/25 1800   Closure Liquid bandage 02/18/25 1800   Incision Drainage Amount None 02/18/25 1800   Dressing Intervention Open to air / No Dressing 02/18/25 1800   Number of days: 0       Incision/Surgical Site 02/18/25 Left;Lower Abdomen (Active)   Incision Assessment [facility] 02/18/25 1800   Closure Liquid bandage 02/18/25 1800   Incision Drainage Amount None 02/18/25 1800   Dressing Intervention Open to air / No Dressing 02/18/25 1800   Number of days: 0       Incision/Surgical Site 02/18/25 Left;Upper Abdomen (Active)   Incision Assessment [facility] 02/18/25 1800   Closure Liquid bandage 02/18/25 1800   Incision Drainage Amount None 02/18/25 1800   Dressing Intervention Open to air / No Dressing 02/18/25 1800   Number of days: 0       Packing 02/18/25 Vagina Qty Placed: 1 (Active)   Packing Assessment clean;dry 02/18/25 1730   Drainage Amount scant 02/18/25 1730   Number of days: 0      CMS  WNL      Equipment Not applicable   Other LDA       IV Access Peripheral IV 02/18/25 Anterior;Right Hand (Active)   Site Assessment [facility] 02/18/25 1830   Line Status Infusing 02/18/25 1830   Dressing Transparent 02/18/25 1830   Dressing Status clean;dry;intact 02/18/25 1730   Dressing Intervention New dressing  02/18/25 1200   Line Intervention Flushed 02/18/25 1200   Phlebitis Scale 0-->no symptoms 02/18/25 1830   Infiltration? no 02/18/25 1830   Number of days: 0       Peripheral IV 02/18/25 Left Lower forearm  (Active)   Site Assessment WDL 02/18/25 1830   Line Status Saline locked 02/18/25 1830   Dressing Status clean;dry;intact 02/18/25 1730   Phlebitis Scale 0-->no symptoms 02/18/25 1830   Infiltration? no 02/18/25 1830   Number of days: 0      Blood Products Not applicable  mL   Intake/Output Date 02/18/25 0700 - 02/19/25 0659   Shift 7241-5433 9236-3794 9870-5005 24 Hour Total   INTAKE   I.V. 500 500  1000   IV Piggyback 100   100   Shift Total(mL/kg) 600(6.46) 500(5.38)  1100(11.84)   OUTPUT   Urine  250  250   Blood  200  200   Shift Total(mL/kg)  450(4.84)  450(4.84)   Weight (kg) 92.9 92.9 92.9 92.9      Drains / Hendrix Urinary Drain 02/18/25 Urethral Catheter Non-latex 16 fr (Active)   Collection Container Standard 02/18/25 1830   Securement Method Commercial securement device 02/18/25 1830   Rationale for Continued Use Surgical procedure 02/18/25 1830   Urine Output 250 mL 02/18/25 1825   Number of days: 0      Time of void PreOp Time of Void Prior to Procedure: 0700 (02/18/25 1118)    PostOp      Diapered? No   Bladder Scan     PO    tolerating sips     Vitals    B/P: 105/72  T: 99.9  F (37.7  C)    Temp src: Oral  P:  Pulse: 85 (02/18/25 1837)          R: 14  O2:  SpO2: 98 %    O2 Device: None (Room air) (02/18/25 1830)    Oxygen Delivery: 6 LPM (02/18/25 1827)         Family/support present significant other   Patient belongings  1 belongings bag   Patient transported on bed   DC meds/scripts (obs/outpt) Yes, meds   Inpatient Pain Meds Released? Yes       Special needs/considerations Uses he-him-his pronouns   Tasks needing completion Has labs ordered for 2030       PETEY Villela

## 2025-02-19 NOTE — ANESTHESIA POSTPROCEDURE EVALUATION
Patient: Elva Aaron    Procedure: Procedure(s):  HYSTERECTOMY, TOTAL, LAPAROSCOPIC, WITH SALPINGO-OOPHORECTOMY;  Cystoscopy, Exam Under Anesthesia - Robot Assited, vaginal laceration repair and vaginal packing       Anesthesia Type:  General    Note:  Disposition: Inpatient   Postop Pain Control: Challenging   PONV: No   Neuro/Psych: Uneventful            Sign Out: Acceptable/Baseline neuro status   Airway/Respiratory: Uneventful            Sign Out: Acceptable/Baseline resp. status   CV/Hemodynamics: Uneventful            Sign Out: Acceptable CV status; No obvious hypovolemia; No obvious fluid overload   Other NRE:    DID A NON-ROUTINE EVENT OCCUR?            Last vitals:  Vitals Value Taken Time   /75 02/18/25 1815   Temp 36.9  C (98.5  F) 02/18/25 1730   Pulse 81 02/18/25 1818   Resp 11 02/18/25 1818   SpO2 100 % 02/18/25 1818   Vitals shown include unfiled device data.    Electronically Signed By: Pan Le MD  February 18, 2025  6:19 PM

## 2025-02-19 NOTE — DISCHARGE SUMMARY
"Gynecology Discharge Summary    Elva Aaron    : 1995  MRN: [id number]            Date of Admission:  2025  Date of Discharge:  2025   Admitting Physician:  Sandra Cruz MD  Discharge Physician:  Sandra Cruz MD  Discharging Service:  Gynecology     Primary Provider: Danii Arzola          Admission Diagnoses:   Encounter for gender affirming hysterectomy           Discharge Diagnosis:   Same, s/p procedure             Procedures:   Robotic-Assisted, Total Laparoscopic Hysterectomy, Bilateral Salpingo-oophorectomy, Cystoscopy, Vaginal laceration repair           Medications Prior to Admission:     Medications Prior to Admission   Medication Sig Dispense Refill Last Dose/Taking    acetaminophen (TYLENOL) 325 MG tablet Take 325-650 mg by mouth every 6 hours as needed for mild pain   2025 at 11:10 AM    fluticasone (FLONASE) 50 MCG/ACT nasal spray Spray 1 spray into both nostrils daily 9.9 mL 0 More than a month    ibuprofen (ADVIL/MOTRIN) 200 MG capsule Take 200 mg by mouth every 4 hours as needed for fever    Past Week    lisdexamfetamine (VYVANSE) 40 MG capsule Take 1 capsule (40 mg) by mouth daily. 30 capsule 0 2025    loratadine (CLARITIN) 10 MG tablet Take 1 tablet (10 mg) by mouth daily 30 tablet 0 More than a month    SUMAtriptan (IMITREX) 25 MG tablet Take 25 mg by mouth at onset of headache for migraine   More than a month    testosterone cypionate (DEPOTESTOSTERONE) 200 MG/ML injection Inject 0.5 mLs (100 mg) subcutaneously once a week. 6 mL 2 Past Week    famotidine (PEPCID) 40 MG tablet Take 1 tablet (40 mg) by mouth 2 times daily. 180 tablet 3     syringe/needle, sisp, (BD LUER-CLAYTON SYRINGE) 25G X 5/8\" 1 ML MISC Inject 0.4 mLs Subcutaneous once a week 10 each 5              Discharge Medications:     Current Discharge Medication List        START taking these medications    Details   !! acetaminophen (TYLENOL) 325 MG tablet Take 3 tablets (975 mg) by mouth " every 6 hours as needed for mild pain.  Qty: 50 tablet, Refills: 0    Associated Diagnoses: S/P hysterectomy with oophorectomy      ibuprofen (ADVIL/MOTRIN) 800 MG tablet Take 1 tablet (800 mg) by mouth every 6 hours as needed for other (mild and/or inflammatory pain).  Qty: 30 tablet, Refills: 0    Associated Diagnoses: S/P hysterectomy with oophorectomy      ondansetron (ZOFRAN) 4 MG tablet Take 1 tablet (4 mg) by mouth every 8 hours as needed for nausea.  Qty: 20 tablet, Refills: 0    Associated Diagnoses: S/P hysterectomy with oophorectomy      oxyCODONE (ROXICODONE) 5 MG tablet Take 1 tablet (5 mg) by mouth every 6 hours as needed for severe pain.  Qty: 6 tablet, Refills: 0    Associated Diagnoses: S/P hysterectomy with oophorectomy      senna-docusate (SENOKOT-S/PERICOLACE) 8.6-50 MG tablet Take 1-2 tablets by mouth 2 times daily as needed for constipation (While on oral opioids.).  Qty: 30 tablet, Refills: 0    Associated Diagnoses: S/P hysterectomy with oophorectomy       !! - Potential duplicate medications found. Please discuss with provider.        CONTINUE these medications which have NOT CHANGED    Details   !! acetaminophen (TYLENOL) 325 MG tablet Take 325-650 mg by mouth every 6 hours as needed for mild pain      fluticasone (FLONASE) 50 MCG/ACT nasal spray Spray 1 spray into both nostrils daily  Qty: 9.9 mL, Refills: 0    Associated Diagnoses: Post-nasal drainage      ibuprofen (ADVIL/MOTRIN) 200 MG capsule Take 200 mg by mouth every 4 hours as needed for fever       lisdexamfetamine (VYVANSE) 40 MG capsule Take 1 capsule (40 mg) by mouth daily.  Qty: 30 capsule, Refills: 0    Associated Diagnoses: Attention deficit hyperactivity disorder (ADHD), predominantly inattentive type      loratadine (CLARITIN) 10 MG tablet Take 1 tablet (10 mg) by mouth daily  Qty: 30 tablet, Refills: 0    Associated Diagnoses: Post-nasal drip      SUMAtriptan (IMITREX) 25 MG tablet Take 25 mg by mouth at onset of headache  "for migraine      testosterone cypionate (DEPOTESTOSTERONE) 200 MG/ML injection Inject 0.5 mLs (100 mg) subcutaneously once a week.  Qty: 6 mL, Refills: 2    Associated Diagnoses: Transgender man on hormone therapy; Gender dysphoria      famotidine (PEPCID) 40 MG tablet Take 1 tablet (40 mg) by mouth 2 times daily.  Qty: 180 tablet, Refills: 3    Associated Diagnoses: Preop general physical exam; Gastroesophageal reflux disease without esophagitis; Gender dysphoria      syringe/needle, sisp, (BD LUER-CLAYTON SYRINGE) 25G X 5/8\" 1 ML MISC Inject 0.4 mLs Subcutaneous once a week  Qty: 10 each, Refills: 5    Associated Diagnoses: Gender dysphoria       !! - Potential duplicate medications found. Please discuss with provider.                Consultations:   none            Brief History of Illness:   Elder Aaron is a 29 year old who presented for scheduled robotic assisted hysterectomy for gender affirmation. The risks, benefits, and alternatives were reviewed prior to the procedure. The patient agreed to proceed. Written informed consent was obtained.          Hospital Course:     The patient underwent RA-TLH, BSO, cystoscopy, and repair of vaginal laceration. The intraoperative course was uncomplicated. . Please see operative note for details.     Operative findings:   EUA revealed small, anteverted mobile uterus, no adnexal masses palpated. Normal appearing external genitalia. Per laparoscopy, normal appearing uterus noted with normal appearing bilateral fallopian tubes and ovaries.  Laparoscopic abdominal survey revealed normal appearing appendix, omentum, liver and bowel.  Bilateral ureteral efflux seen on cystoscopy with no evidence of bladder injury. At conclusion of case, anterior vaginal laceration noted, stitch placed at this site with 0 Vicryl. Ongoing general oozing noted to right and left walls of vagina. Due to this decision was made to place vaginal packing x1. Hendrix was replaced.     The patient was " admitted for postoperative recovery. Postoperative hemoglobin was 12.9. Vaginal packing and Hendrix catheter were removed on POD#1. His postoperative course was uncomplicated. He was deemed appropriate for discharge on POD#1. At that time, he was afebrile, his pain was well controlled on PO meds, and he was tolerating regular diet with no nausea or vomiting, ambulating and voiding without difficulty, passing flatus. His vitals were within normal limits. Please see progress note on the day of discharge for further details of exam and findings.            Discharge Instructions and Follow-Up:     Discharge diet: Regular   Discharge activity: No lifting, driving, or strenuous exercise for 4-6 week(s)  Pelvic rest: abstain from intercourse and do not use tampons for 6-8 week(s)   Discharge follow-up: Follow up with clinic in 2-4 weeks   Other instructions: Per AVS       Patient was seen and evaluated by Dr. Cruz on day of discharge    Kevin Lopez DO, MS  Obstetrics, Gynecology & Women's Health   Resident, PGY-4  02/19/2025 12:19 PM

## 2025-02-19 NOTE — PLAN OF CARE
Goal Outcome Evaluation:      Plan of Care Reviewed With: patient    Overall Patient Progress: improvingOverall Patient Progress: improving         Pt A&Ox4; VSS; has not gotten out of bed yet; liquids as tolerated and tolerating very well- nurse tried crackers with pt and tolerated well; vaginal laceration + vaginal packing; 2 lap sites CDI; L&R PIV SL; scop patch in place and no c/o nausea; capno on and RA; pain managed with PO oxy and acetaminophen and IV dilaudid given x1; continent of bowel and gandhi catheter in place with adequate output; no significant events overnight; pt can make needs known

## 2025-02-19 NOTE — DISCHARGE INSTRUCTIONS
GENERAL POST-OPERATIVE  PATIENT DISCHARGE INSTRUCTIONS      Call Surgeon if you have:    Temperature greater than 100.4    Persistent nausea and vomiting    Severe uncontrolled pain    Redness, tenderness, or signs of infection (pain, swelling, redness, odor or green/yellow discharge around the site)    Difficulty breathing, headache or visual disturbances    Hives    Persistent dizziness or light-headedness    Extreme fatigue    Any other questions or concerns you may have after discharge    In an emergency, call 911 or go to an Emergency Department at a nearby hospital    WOUND CARE INSTRUCTIONS: Keep a dry clean dressing on the wound if there is drainage. The initial bandage may be removed after 24 hours. Once the wound has quit draining you may leave it open to air. If clothing rubs against the wound or causes irritation and the wound is not draining you may cover it with a dry dressing during the daytime. Try to keep the wound dry and avoid ointments on the wound unless directed to do so. If the wound becomes bright red and painful or starts to drain infected material that is not clear, please contact your physician immediately.    1. You may shower 24 hrs after surgery    2. No soaking in the tub    DIET: There are no dietary restrictions. You may eat any foods that you can tolerate. It is a good idea to eat a high fiber diet and take in plenty of fluids to prevent constipation. If you do become constipated you may want to take a mild laxative or take ducolax tablets on a daily basis until your bowel habits are regular. Constipation can be very uncomfortable, along with straining, after recent surgery.    ACTIVITY: You are encouraged to cough and deep breath or use your incentive spirometer if you were given one, every 15-30 minutes when awake. This will help prevent respiratory complications and low grade fevers post-operatively if you had a general anesthetic. You may want to hug a pillow when coughing and  sneezing to add additional support to the surgical area, if you had abdominal or chest surgery, which will decrease pain during these times.    1. No heavy lifting >20lbs or strenuous exercise for six-eight weeks. No exercise in which you are using core muscles (yoga, pilates, swimming, weight lifting)    2. You may walk as much as you wish. You are encouraged to increase your activity each day after surgery. Stairs are okay.    3. Nothing per vagina for eight weeks. No tampons, no intercourse, no douching. You can expect some light vaginal spotting and discharge for up to six weeks. If bleeding becomes heavy, please contact the office.    MEDICATIONS: Try to take narcotic medications and anti-inflammatory medications, such as tylenol, ibuprofen, naprosyn, etc., with food. This will minimize stomach upset from the medication. Should you develop nausea and vomiting from the pain medication, or develop a rash, please discontinue the medication and contact your physician. You should not drive, make important decisions, or operate machinery when taking narcotic pain medication.    OTHER: Patients are often constipated after general anesthesia and surgery. The patient should continue to take stool softeners (for example, Senokot-S) for the next six weeks and consume adequate amounts of water. If the patient remains constipated or unable to pass stool, please try one or all of the following measures:    1. Milk of Magnesia 30cc twice a day as needed by mouth    2. Metamucil 2 tablespoons in 12 ounces of fluid    3. Dulcolax oral or suppositories    4. Prunes or prune juice    5. Miralax daily?    QUESTIONS: Please feel free to call your physician or the hospital  if you have any questions, and they will be glad to assist you.

## 2025-02-22 ENCOUNTER — HOSPITAL ENCOUNTER (EMERGENCY)
Facility: CLINIC | Age: 30
Discharge: HOME | End: 2025-02-22
Attending: EMERGENCY MEDICINE | Admitting: EMERGENCY MEDICINE
Payer: COMMERCIAL

## 2025-02-22 ENCOUNTER — TELEPHONE (OUTPATIENT)
Dept: OBGYN | Facility: CLINIC | Age: 30
End: 2025-02-22
Payer: COMMERCIAL

## 2025-02-22 VITALS
TEMPERATURE: 98.2 F | DIASTOLIC BLOOD PRESSURE: 75 MMHG | SYSTOLIC BLOOD PRESSURE: 108 MMHG | RESPIRATION RATE: 18 BRPM | HEART RATE: 109 BPM | OXYGEN SATURATION: 100 %

## 2025-02-22 DIAGNOSIS — Z98.890 POSTOPERATIVE STATE: ICD-10-CM

## 2025-02-22 LAB
ALBUMIN UR-MCNC: NEGATIVE MG/DL
ANION GAP SERPL CALCULATED.3IONS-SCNC: 10 MMOL/L (ref 7–15)
APPEARANCE UR: ABNORMAL
BACTERIA #/AREA URNS HPF: ABNORMAL /HPF
BASOPHILS # BLD AUTO: 0 10E3/UL (ref 0–0.2)
BASOPHILS NFR BLD AUTO: 0 %
BILIRUB UR QL STRIP: NEGATIVE
BUN SERPL-MCNC: 6 MG/DL (ref 6–20)
CALCIUM SERPL-MCNC: 9.2 MG/DL (ref 8.8–10.4)
CHLORIDE SERPL-SCNC: 105 MMOL/L (ref 98–107)
COLOR UR AUTO: ABNORMAL
CREAT SERPL-MCNC: 0.64 MG/DL (ref 0.51–1.17)
EGFRCR SERPLBLD CKD-EPI 2021: >90 ML/MIN/1.73M2
EOSINOPHIL # BLD AUTO: 0.2 10E3/UL (ref 0–0.7)
EOSINOPHIL NFR BLD AUTO: 2 %
ERYTHROCYTE [DISTWIDTH] IN BLOOD BY AUTOMATED COUNT: 12.7 % (ref 10–15)
GLUCOSE SERPL-MCNC: 105 MG/DL (ref 70–99)
GLUCOSE UR STRIP-MCNC: NEGATIVE MG/DL
HCO3 SERPL-SCNC: 27 MMOL/L (ref 22–29)
HCT VFR BLD AUTO: 40.8 % (ref 35–53)
HGB BLD-MCNC: 13.2 G/DL (ref 11.7–17.7)
HGB UR QL STRIP: ABNORMAL
IMM GRANULOCYTES # BLD: 0.1 10E3/UL
IMM GRANULOCYTES NFR BLD: 1 %
KETONES UR STRIP-MCNC: NEGATIVE MG/DL
LEUKOCYTE ESTERASE UR QL STRIP: ABNORMAL
LYMPHOCYTES # BLD AUTO: 2.7 10E3/UL (ref 0.8–5.3)
LYMPHOCYTES NFR BLD AUTO: 29 %
MCH RBC QN AUTO: 29.3 PG (ref 26.5–33)
MCHC RBC AUTO-ENTMCNC: 32.4 G/DL (ref 31.5–36.5)
MCV RBC AUTO: 91 FL (ref 78–100)
MONOCYTES # BLD AUTO: 0.7 10E3/UL (ref 0–1.3)
MONOCYTES NFR BLD AUTO: 7 %
MUCOUS THREADS #/AREA URNS LPF: PRESENT /LPF
NEUTROPHILS # BLD AUTO: 5.7 10E3/UL (ref 1.6–8.3)
NEUTROPHILS NFR BLD AUTO: 61 %
NITRATE UR QL: NEGATIVE
NRBC # BLD AUTO: 0 10E3/UL
NRBC BLD AUTO-RTO: 0 /100
PH UR STRIP: 8 [PH] (ref 5–7)
PLATELET # BLD AUTO: 253 10E3/UL (ref 150–450)
POTASSIUM SERPL-SCNC: 4.5 MMOL/L (ref 3.4–5.3)
RBC # BLD AUTO: 4.51 10E6/UL (ref 3.8–5.9)
RBC URINE: >182 /HPF
SODIUM SERPL-SCNC: 142 MMOL/L (ref 135–145)
SP GR UR STRIP: 1.01 (ref 1–1.03)
SQUAMOUS EPITHELIAL: 2 /HPF
TRANSITIONAL EPI: <1 /HPF
UROBILINOGEN UR STRIP-MCNC: NORMAL MG/DL
WBC # BLD AUTO: 9.4 10E3/UL (ref 4–11)
WBC URINE: 34 /HPF

## 2025-02-22 PROCEDURE — 80048 BASIC METABOLIC PNL TOTAL CA: CPT | Performed by: EMERGENCY MEDICINE

## 2025-02-22 PROCEDURE — 82310 ASSAY OF CALCIUM: CPT | Performed by: EMERGENCY MEDICINE

## 2025-02-22 PROCEDURE — 36415 COLL VENOUS BLD VENIPUNCTURE: CPT | Performed by: EMERGENCY MEDICINE

## 2025-02-22 PROCEDURE — 85025 COMPLETE CBC W/AUTO DIFF WBC: CPT | Performed by: EMERGENCY MEDICINE

## 2025-02-22 PROCEDURE — 258N000003 HC RX IP 258 OP 636: Performed by: EMERGENCY MEDICINE

## 2025-02-22 PROCEDURE — 87186 SC STD MICRODIL/AGAR DIL: CPT | Performed by: EMERGENCY MEDICINE

## 2025-02-22 PROCEDURE — 96360 HYDRATION IV INFUSION INIT: CPT | Performed by: EMERGENCY MEDICINE

## 2025-02-22 PROCEDURE — 99283 EMERGENCY DEPT VISIT LOW MDM: CPT | Mod: 25 | Performed by: EMERGENCY MEDICINE

## 2025-02-22 PROCEDURE — 99284 EMERGENCY DEPT VISIT MOD MDM: CPT | Performed by: EMERGENCY MEDICINE

## 2025-02-22 PROCEDURE — 81001 URINALYSIS AUTO W/SCOPE: CPT | Performed by: EMERGENCY MEDICINE

## 2025-02-22 PROCEDURE — 84520 ASSAY OF UREA NITROGEN: CPT | Performed by: EMERGENCY MEDICINE

## 2025-02-22 RX ADMIN — SODIUM CHLORIDE 1000 ML: 9 INJECTION, SOLUTION INTRAVENOUS at 15:55

## 2025-02-22 ASSESSMENT — COLUMBIA-SUICIDE SEVERITY RATING SCALE - C-SSRS
2. HAVE YOU ACTUALLY HAD ANY THOUGHTS OF KILLING YOURSELF IN THE PAST MONTH?: NO
1. IN THE PAST MONTH, HAVE YOU WISHED YOU WERE DEAD OR WISHED YOU COULD GO TO SLEEP AND NOT WAKE UP?: NO
6. HAVE YOU EVER DONE ANYTHING, STARTED TO DO ANYTHING, OR PREPARED TO DO ANYTHING TO END YOUR LIFE?: NO

## 2025-02-22 ASSESSMENT — ACTIVITIES OF DAILY LIVING (ADL)
ADLS_ACUITY_SCORE: 41

## 2025-02-22 NOTE — ED TRIAGE NOTES
Pt reports frequent dizzy spells with visual changes (can't focus) per pt. All of this started post hysterectomy done 2/18. Pt denies any surgical post ops complications      Triage Assessment (Adult)       Row Name 02/22/25 1419          Triage Assessment    Airway WDL WDL        Respiratory WDL    Respiratory WDL WDL        Skin Circulation/Temperature WDL    Skin Circulation/Temperature WDL WDL        Cardiac WDL    Cardiac WDL WDL        Peripheral/Neurovascular WDL    Peripheral Neurovascular WDL WDL        Cognitive/Neuro/Behavioral WDL    Cognitive/Neuro/Behavioral WDL WDL

## 2025-02-22 NOTE — DISCHARGE INSTRUCTIONS
Home to rest tonight    Keep well-hydrated    Check your MyChart in the next 24 to 48 hours for your urine culture results and if positive you will need treatment    Please make an appointment to follow up with your surgeons or Your Primary Care Provider in 5 to 7 days for recheck if not better.    Return to the ER for fever greater than 100.5

## 2025-02-22 NOTE — TELEPHONE ENCOUNTER
Called triage line post op day 4 s/p ANALI WU.  Was admitted post op overnight for some increased bleeding vaginally d/t vaginal trauma from the uterine manipulator.  Went home POD 1.  Now has concerns for some facial numbness or irriation and a 'lazy eye' that is making it hard to focus. Notes feeling woozy/wobbly.     Using ibuprofen and tylenol for pain.  No narcotics.  No fever. No n/v.     Given strange vision symptoms and facial involvement, recommend eval in ED.  Pt agreeable.     Sandra Cruz MD, FACOG  (she/her/hers)    Department of Ob/Gyn/Women's Health  [employer]  [facility]  [address] [place] [address]  [email or web address]  p. [phone]  f. [phone]

## 2025-02-22 NOTE — ED PROVIDER NOTES
[facility] EMERGENCY DEPARTMENT ([facility])    2/22/25      ED PROVIDER NOTE     History     Chief Complaint   Patient presents with    Dizziness     Pt reports frequent dizzy spells with visual changes (can't focus) per pt. All of this started post hysterectomy done 2/18. Pt denies any surgical post ops complications     HPI  Elva Aaron is a 29 year old male who recently underwent a hysterectomy as of an approximate 10 days ago who has been healing fairly well but has had decreased p.o. intake. He has noticed in the past 2 days that he has had some near syncopal type episodes associated with some vertiginous type symptoms and some tingling on the right side of his face. The patient states that he normally has migraines that are ocular in nature and states that this is a little bit different than that. The patient states that he has had no vomiting, diarrhea, melena, or bright blood per rectum. Patient states he did spend an extra night in the hospital because of some postsurgical bleeding.    Past Medical History  Past Medical History:   Diagnosis Date    PONV (postoperative nausea and vomiting)      Past Surgical History:   Procedure Laterality Date    ADENOIDECTOMY      COSMETIC PLACEMENT OF DENTAL IMPLANT(S)      DAVINCI HYSTERECTOMY TOTAL, BILATERAL SALPINGO-OOPHORECTOMY, COMBINED N/A 2/18/2025    Procedure: HYSTERECTOMY, TOTAL, LAPAROSCOPIC, WITH SALPINGO-OOPHORECTOMY;  Cystoscopy, Exam Under Anesthesia - Robot Assited, vaginal laceration repair and vaginal packing;  Surgeon: Sandra Cruz MD;  Location: [facility] OR    HC TOOTH EXTRACTION W/FORCEP      MASTECTOMY SIMPLE BILATERAL Bilateral 12/17/2021    Procedure: MASTECTOMY, BILATERAL, SIMPLE, WITH nipple grafts. OnQ;  Surgeon: Lashanda Mulligan MD;  Location: [facility] OR    TONSILLECTOMY       acetaminophen (TYLENOL) 325 MG tablet  acetaminophen (TYLENOL) 325 MG tablet  famotidine (PEPCID) 40 MG tablet  fluticasone (FLONASE) 50  "MCG/ACT nasal spray  ibuprofen (ADVIL/MOTRIN) 200 MG capsule  ibuprofen (ADVIL/MOTRIN) 800 MG tablet  lisdexamfetamine (VYVANSE) 40 MG capsule  loratadine (CLARITIN) 10 MG tablet  ondansetron (ZOFRAN) 4 MG tablet  senna-docusate (SENOKOT-S/PERICOLACE) 8.6-50 MG tablet  SUMAtriptan (IMITREX) 25 MG tablet  syringe/needle, sisp, (BD LUER-CLAYTON SYRINGE) 25G X 5/8\" 1 ML MISC  testosterone cypionate (DEPOTESTOSTERONE) 200 MG/ML injection      No Known Allergies    Family History  Family History   Problem Relation Age of Onset    Thyroid Disease Mother     Impaired Fasting Glucose Father     No Known Problems Brother     Clotting Disorder Maternal Grandmother     Lung Cancer Maternal Grandmother     Cancer Maternal Grandfather         Colon Ca    Chronic Obstructive Pulmonary Disease Maternal Grandfather     Chronic Obstructive Pulmonary Disease Paternal Grandfather      Social History   Social History     Tobacco Use    Smoking status: Never    Smokeless tobacco: Never   Vaping Use    Vaping status: Never Used   Substance Use Topics    Alcohol use: Yes     Comment: Never more than one, and goes weeks without.    Drug use: Yes     Frequency: 1.0 times per week     Types: Marijuana      A complete review of systems was performed with pertinent positives and negatives noted in the HPI, and all other systems negative.    Physical Exam   BP: 108/75  Pulse: 109  Temp: 98.2  F (36.8  C)  Resp: 18  SpO2: 100 %  Lying Orthostatic BP: 102/70  Lying Orthostatic Pulse: 96 bpm  Sitting Orthostatic BP: 112/77  Sitting Orthostatic Pulse: 96 bpm  Standing Orthostatic BP: 114/81  Standing Orthostatic Pulse: 94 bpm  Physical Exam  Vitals and nursing note reviewed.   Constitutional:       General: He is not in acute distress.     Appearance: He is not toxic-appearing.   HENT:      Head: Atraumatic.   Eyes:      Extraocular Movements: Extraocular movements intact.      Pupils: Pupils are equal, round, and reactive to light.   Cardiovascular: " "     Rate and Rhythm: Regular rhythm.   Pulmonary:      Breath sounds: Normal breath sounds.   Abdominal:      Palpations: Abdomen is soft.   Musculoskeletal:         General: No deformity.      Cervical back: Neck supple.   Neurological:      General: No focal deficit present.      Mental Status: He is alert and oriented to person, place, and time.      Cranial Nerves: No cranial nerve deficit.      Motor: No weakness.   Psychiatric:         Mood and Affect: Mood normal.           ED Course, Procedures, & Data      Orders Placed This Encounter   Procedures    Basic metabolic panel    CBC with platelets and differential    UA with Microscopic reflex to Culture    Orthostatic blood pressure and pulse    Peripheral IV catheter    Urine Culture    CBC with platelets differential       Procedures        Results for orders placed or performed during the hospital encounter of 02/22/25   Basic metabolic panel     Status: Abnormal   Result Value Ref Range    Sodium 142 135 - 145 mmol/L    Potassium 4.5 3.4 - 5.3 mmol/L    Chloride 105 98 - 107 mmol/L    Carbon Dioxide (CO2) 27 22 - 29 mmol/L    Anion Gap 10 7 - 15 mmol/L    Urea Nitrogen 6.0 6.0 - 20.0 mg/dL    Creatinine 0.64 0.51 - 1.17 mg/dL    GFR Estimate >90 >60 mL/min/1.73m2    Calcium 9.2 8.8 - 10.4 mg/dL    Glucose 105 (H) 70 - 99 mg/dL    Narrative    The generation of reference intervals for this test is currently based on binary male or female sex. If the electronic health record information indicates another gender identity or if Legal Sex is recorded as \"Unknown\", both male and female reference intervals are provided where applicable, and should be considered according to the individual's appropriate clinical context.   CBC with platelets and differential     Status: None   Result Value Ref Range    WBC Count 9.4 4.0 - 11.0 10e3/uL    RBC Count 4.51 3.80 - 5.90 10e6/uL    Hemoglobin 13.2 11.7 - 17.7 g/dL    Hematocrit 40.8 35.0 - 53.0 %    MCV 91 78 - 100 fL " "   MCH 29.3 26.5 - 33.0 pg    MCHC 32.4 31.5 - 36.5 g/dL    RDW 12.7 10.0 - 15.0 %    Platelet Count 253 150 - 450 10e3/uL    % Neutrophils 61 %    % Lymphocytes 29 %    % Monocytes 7 %    % Eosinophils 2 %    % Basophils 0 %    % Immature Granulocytes 1 %    NRBCs per 100 WBC 0 <1 /100    Absolute Neutrophils 5.7 1.6 - 8.3 10e3/uL    Absolute Lymphocytes 2.7 0.8 - 5.3 10e3/uL    Absolute Monocytes 0.7 0.0 - 1.3 10e3/uL    Absolute Eosinophils 0.2 0.0 - 0.7 10e3/uL    Absolute Basophils 0.0 0.0 - 0.2 10e3/uL    Absolute Immature Granulocytes 0.1 <=0.4 10e3/uL    Absolute NRBCs 0.0 10e3/uL    Narrative    The generation of reference intervals for this test is currently based on binary male or female sex. If the electronic health record information indicates another gender identity or if Legal Sex is recorded as \"Unknown\", both male and female reference intervals are provided where applicable, and should be considered according to the individual's appropriate clinical context.   UA with Microscopic reflex to Culture     Status: Abnormal    Specimen: Urine, Midstream   Result Value Ref Range    Color Urine Straw Colorless, Straw, Light Yellow, Yellow    Appearance Urine Slightly Cloudy (A) Clear    Glucose Urine Negative Negative mg/dL    Bilirubin Urine Negative Negative    Ketones Urine Negative Negative mg/dL    Specific Gravity Urine 1.010 1.003 - 1.035    Blood Urine Large (A) Negative    pH Urine 8.0 (H) 5.0 - 7.0    Protein Albumin Urine Negative Negative mg/dL    Urobilinogen Urine Normal Normal, 2.0 mg/dL    Nitrite Urine Negative Negative    Leukocyte Esterase Urine Moderate (A) Negative    Bacteria Urine Few (A) None Seen /HPF    Mucus Urine Present (A) None Seen /LPF    RBC Urine >182 (H) <=2 /HPF    WBC Urine 34 (H) <=5 /HPF    Squamous Epithelials Urine 2 (H) <=1 /HPF    Transitional Epithelials Urine <1 <=1 /HPF    Narrative    Urine Culture ordered based on laboratory criteria   CBC with platelets " differential     Status: None    Narrative    The following orders were created for panel order CBC with platelets differential.  Procedure                               Abnormality         Status                     ---------                               -----------         ------                     CBC with platelets and d...[[id number]]                      Final result                 Please view results for these tests on the individual orders.     Medications   sodium chloride (PF) 0.9% PF flush 3 mL (3 mLs Intracatheter $Given 2/22/25 1520)   sodium chloride (PF) 0.9% PF flush 3 mL (has no administration in time range)   sodium chloride 0.9% BOLUS 1,000 mL (0 mLs Intravenous Stopped 2/22/25 1705)       Critical care was not performed.     Medical Decision Making  The patient's presentation was of moderate complexity (an acute illness with systemic symptoms).    The patient's evaluation involved:  ordering and/or review of 3+ test(s) in this encounter (see above)    The patient's management necessitated moderate risk (a decision regarding minor procedure (IV fluid administration) with risk factors of none).    Assessment & Plan      I have reviewed the nursing notes.     Patient's presentation most consistent with some postoperative fatigue and dehydration however I cannot rule out an atypical migraine for this patient as he does have ocular migraines and this may be part of his dizziness.  Currently the patient is resting comfortably and feels better after IV fluids and will be sent home.    I have reviewed the findings, diagnosis, plan and need for follow up with the patient.      Final diagnoses:   Postoperative state     Home to rest tonight    Keep well-hydrated    Check your MyChart in the next 24 to 48 hours for your urine culture results and if positive you will need treatment    Please make an appointment to follow up with your surgeons or Your Primary Care Provider in 5 to 7 days for recheck if not  better.    Return to the ER for fever greater than 100.5    Jean Banerjee MD   [facility] EMERGENCY DEPARTMENT  2/22/2025     Jean Banerjee MD  02/22/25 7482

## 2025-02-24 DIAGNOSIS — N39.0 URINARY TRACT INFECTION WITH HEMATURIA, SITE UNSPECIFIED: Primary | ICD-10-CM

## 2025-02-24 DIAGNOSIS — R31.9 URINARY TRACT INFECTION WITH HEMATURIA, SITE UNSPECIFIED: Primary | ICD-10-CM

## 2025-02-24 LAB — BACTERIA UR CULT: ABNORMAL

## 2025-02-24 RX ORDER — CEPHALEXIN 500 MG/1
500 CAPSULE ORAL 3 TIMES DAILY
Qty: 21 CAPSULE | Refills: 0 | Status: SHIPPED | OUTPATIENT
Start: 2025-02-24

## 2025-02-25 DIAGNOSIS — N39.0 URINARY TRACT INFECTION WITH HEMATURIA, SITE UNSPECIFIED: Primary | ICD-10-CM

## 2025-02-25 DIAGNOSIS — R31.9 URINARY TRACT INFECTION WITH HEMATURIA, SITE UNSPECIFIED: Primary | ICD-10-CM

## 2025-02-25 RX ORDER — NITROFURANTOIN 25; 75 MG/1; MG/1
100 CAPSULE ORAL 2 TIMES DAILY
Qty: 10 CAPSULE | Refills: 0 | Status: SHIPPED | OUTPATIENT
Start: 2025-02-25 | End: 2025-03-02

## 2025-02-27 ENCOUNTER — TELEPHONE (OUTPATIENT)
Dept: OBGYN | Facility: CLINIC | Age: 30
End: 2025-02-27
Payer: COMMERCIAL

## 2025-02-27 NOTE — TELEPHONE ENCOUNTER
Caller reporting the following red-flag symptom(s): Last week pt had procedure, today past quite a large blood clot     Per the system red-flag symptom policy, patient was instructed to:  speak with a Registered Nurse    Action:  Patient warm transferred to a Registered Nurse

## 2025-02-27 NOTE — TELEPHONE ENCOUNTER
Patient calling to report that he passed a grape sized blood clot. He had a laparoscopic hysterectomy on 2/18/25. Report some cramping when this happened and has light spotting currently, other than that patient reports feeling fine. Denies fever, has a follow up set up on 3/31/25.     This RN spoke to Dr. Molina who recommended that he can monitor since it was one time. This RN called Zeiain and explained that sometimes after surgery, blood can pool and result in a clot, and it will make it's  way out of the body. If more clots continue to pass or cramping, he should call the nurse line and may be advised to come in. He verbalized understanding.

## 2025-03-01 ENCOUNTER — MYC MEDICAL ADVICE (OUTPATIENT)
Dept: FAMILY MEDICINE | Facility: CLINIC | Age: 30
End: 2025-03-01
Payer: COMMERCIAL

## 2025-03-01 DIAGNOSIS — Z87.440 PERSONAL HISTORY OF URINARY TRACT INFECTION: Primary | ICD-10-CM

## 2025-03-04 ENCOUNTER — NURSE TRIAGE (OUTPATIENT)
Dept: FAMILY MEDICINE | Facility: CLINIC | Age: 30
End: 2025-03-04
Payer: COMMERCIAL

## 2025-03-04 ENCOUNTER — OFFICE VISIT (OUTPATIENT)
Dept: URGENT CARE | Facility: URGENT CARE | Age: 30
End: 2025-03-04
Payer: COMMERCIAL

## 2025-03-04 VITALS
WEIGHT: 207 LBS | OXYGEN SATURATION: 97 % | RESPIRATION RATE: 18 BRPM | SYSTOLIC BLOOD PRESSURE: 121 MMHG | HEART RATE: 100 BPM | HEIGHT: 64 IN | TEMPERATURE: 98.3 F | BODY MASS INDEX: 35.34 KG/M2 | DIASTOLIC BLOOD PRESSURE: 81 MMHG

## 2025-03-04 DIAGNOSIS — T14.8XXA WOUND INFECTION: Primary | ICD-10-CM

## 2025-03-04 DIAGNOSIS — L08.9 WOUND INFECTION: Primary | ICD-10-CM

## 2025-03-04 PROCEDURE — 99214 OFFICE O/P EST MOD 30 MIN: CPT | Performed by: PREVENTIVE MEDICINE

## 2025-03-04 RX ORDER — DOXYCYCLINE 100 MG/1
100 TABLET ORAL 2 TIMES DAILY
Qty: 10 TABLET | Refills: 0 | Status: SHIPPED | OUTPATIENT
Start: 2025-03-04 | End: 2025-03-09

## 2025-03-04 ASSESSMENT — PAIN SCALES - GENERAL: PAINLEVEL_OUTOF10: MILD PAIN (3)

## 2025-03-04 NOTE — TELEPHONE ENCOUNTER
Nurse Triage SBAR    Is this a 2nd Level Triage? NO    Situation:  surgical incision appears infected    Background:  patient had a laparoscopic hysterectomy on 2/18.    Assessment:  patient reports that he noticed some increased redness and pus drainage from incision on bellybutton yesterday.  He states that this incision was closed with glue and around 1 week after his procedure the glue split.  He denies any red streaking, no fevers.  He does endorse some soreness, redness and very mild swelling.    Protocol Recommended Disposition:   Go To Office Now, See More Appropriate Protocol    Recommendation:  advised patient to be seen in urgent care tonight.  Patient agreeable and will be seen         Does the patient meet one of the following criteria for ADS visit consideration? 16+ years old, with an [facility] PCP     TIP  Providers, please consider if this condition is appropriate for management at one of our Acute and Diagnostic Services sites.     If patient is a good candidate, please use dotphrase <dot>triageresponse and select Refer to ADS to document.      Reason for Disposition   Surgical wound infection suspected (post-op)   Incision looks infected (e.g., spreading redness, pus)    Protocols used: Wound Infection [id number], Post-Op Incision Symptoms and [id number]

## 2025-03-05 NOTE — PROGRESS NOTES
Assessment & Plan     (T14.8XXA,  L08.9) Wound infection  (primary encounter diagnosis)  Plan: doxycycline monohydrate (ADOXA) 100 MG tablet    Doxycycline for 5 days  Heat to the area 3 times daily  Follow up with surgeon in 3-4 days for recheck.     35 minutes spent by me on the date of the encounter doing chart review, history and exam, documentation and further activities per the note        No follow-ups on file.    Loy Pollard MD  [facility]    Subjective     Elva Aaron is a 29 year old year old adult who presents to clinic today for the following health issues:    Patient presents with:  Urgent Care: Pt in clinic to have wound check following hysterectomy 2/18/2025.  Wound Check    This is a 30 yo male who had hysterectomy in mid February 2025.  He has had some irritation of the periumbilical wound and redness there for the past 2 days.  Just completed a course of keflex for a uti 3 days ago.  No fever and otherwise feels well.    Patient Active Problem List   Diagnosis    ADHD (attention deficit hyperactivity disorder)    Anxiety    Major depressive disorder, recurrent, in full remission    Gender dysphoria    Migraine with aura and without status migrainosus, not intractable    Acid reflux    Class 1 drug-induced obesity without serious comorbidity with body mass index (BMI) of 34.0 to 34.9 in adult    S/P hysterectomy with oophorectomy    Encounter for gender affirmation procedure       Current Outpatient Medications   Medication Sig Dispense Refill    acetaminophen (TYLENOL) 325 MG tablet Take 3 tablets (975 mg) by mouth every 6 hours as needed for mild pain. 50 tablet 0    acetaminophen (TYLENOL) 325 MG tablet Take 325-650 mg by mouth every 6 hours as needed for mild pain      famotidine (PEPCID) 40 MG tablet Take 1 tablet (40 mg) by mouth 2 times daily. 180 tablet 3    fluticasone (FLONASE) 50 MCG/ACT nasal spray Spray 1 spray into both nostrils daily 9.9  "mL 0    ibuprofen (ADVIL/MOTRIN) 200 MG capsule Take 200 mg by mouth every 4 hours as needed for fever       lisdexamfetamine (VYVANSE) 40 MG capsule Take 1 capsule (40 mg) by mouth daily. 30 capsule 0    loratadine (CLARITIN) 10 MG tablet Take 1 tablet (10 mg) by mouth daily 30 tablet 0    ondansetron (ZOFRAN) 4 MG tablet Take 1 tablet (4 mg) by mouth every 8 hours as needed for nausea. 20 tablet 0    SUMAtriptan (IMITREX) 25 MG tablet Take 25 mg by mouth at onset of headache for migraine      syringe/needle, sisp, (BD LUER-CLAYTON SYRINGE) 25G X 5/8\" 1 ML MISC Inject 0.4 mLs Subcutaneous once a week 10 each 5    testosterone cypionate (DEPOTESTOSTERONE) 200 MG/ML injection Inject 0.5 mLs (100 mg) subcutaneously once a week. 6 mL 2    cephALEXin (KEFLEX) 500 MG capsule Take 1 capsule (500 mg) by mouth 3 times daily. (Patient not taking: Reported on 3/4/2025) 21 capsule 0    ibuprofen (ADVIL/MOTRIN) 800 MG tablet Take 1 tablet (800 mg) by mouth every 6 hours as needed for other (mild and/or inflammatory pain). 30 tablet 0    senna-docusate (SENOKOT-S/PERICOLACE) 8.6-50 MG tablet Take 1-2 tablets by mouth 2 times daily as needed for constipation (While on oral opioids.). (Patient not taking: Reported on 3/4/2025) 30 tablet 0     No current facility-administered medications for this visit.       Past Medical History:   Diagnosis Date    PONV (postoperative nausea and vomiting)        Social History   reports that he has never smoked. He has never used smokeless tobacco. He reports current alcohol use. He reports current drug use. Frequency: 1.00 time per week. Drug: Marijuana.    Family History   Problem Relation Age of Onset    Thyroid Disease Mother     Impaired Fasting Glucose Father     No Known Problems Brother     Clotting Disorder Maternal Grandmother     Lung Cancer Maternal Grandmother     Cancer Maternal Grandfather         Colon Ca    Chronic Obstructive Pulmonary Disease Maternal Grandfather     Chronic " "Obstructive Pulmonary Disease Paternal Grandfather        Review of Systems  Constitutional, HEENT, cardiovascular, pulmonary, GI, , musculoskeletal, neuro, skin, endocrine and psych systems are negative, except as otherwise noted.      Objective    /81   Pulse 100   Temp 98.3  F (36.8  C) (Temporal)   Resp 18   Ht 1.626 m (5' 4\")   Wt 93.9 kg (207 lb)   LMP  (LMP Unknown)   SpO2 97%   BMI 35.53 kg/m    Physical Exam   GENERAL: alert and no distress  EYES: Eyes grossly normal to inspection, PERRL and conjunctivae and sclerae normal  HENT: ear canals and TM's normal, nose and mouth without ulcers or lesions  NECK: no adenopathy, no asymmetry, masses, or scars  RESP: lungs clear to auscultation - no rales, rhonchi or wheezes  CV: regular rate and rhythm, normal S1 S2, no S3 or S4, no murmur, click or rub, no peripheral edema  ABDOMEN: soft, nontender, no hepatosplenomegaly, no masses and bowel sounds normal  MS: no gross musculoskeletal defects noted, no edema  SKIN: no suspicious lesions or rashes except periumbilical wound with slight redness and serous drainage on the left side.  No fluctuance.  NEURO: Normal strength and tone, mentation intact and speech normal  PSYCH: mentation appears normal, affect normal/bright        "

## 2025-03-10 ENCOUNTER — LAB (OUTPATIENT)
Dept: LAB | Facility: CLINIC | Age: 30
End: 2025-03-10
Payer: COMMERCIAL

## 2025-03-10 DIAGNOSIS — Z87.440 PERSONAL HISTORY OF URINARY TRACT INFECTION: ICD-10-CM

## 2025-03-10 LAB
ALBUMIN UR-MCNC: NEGATIVE MG/DL
APPEARANCE UR: CLEAR
BILIRUB UR QL STRIP: NEGATIVE
COLOR UR AUTO: YELLOW
GLUCOSE UR STRIP-MCNC: NEGATIVE MG/DL
HGB UR QL STRIP: ABNORMAL
KETONES UR STRIP-MCNC: NEGATIVE MG/DL
LEUKOCYTE ESTERASE UR QL STRIP: NEGATIVE
NITRATE UR QL: NEGATIVE
PH UR STRIP: 6 [PH] (ref 5–8)
RBC #/AREA URNS AUTO: ABNORMAL /HPF
SP GR UR STRIP: 1.01 (ref 1–1.03)
SQUAMOUS #/AREA URNS AUTO: ABNORMAL /LPF
UROBILINOGEN UR STRIP-ACNC: 0.2 E.U./DL
WBC #/AREA URNS AUTO: ABNORMAL /HPF

## 2025-03-10 PROCEDURE — 81001 URINALYSIS AUTO W/SCOPE: CPT

## 2025-03-19 ENCOUNTER — TELEPHONE (OUTPATIENT)
Dept: OBGYN | Facility: CLINIC | Age: 30
End: 2025-03-19

## 2025-03-19 NOTE — TELEPHONE ENCOUNTER
[facility] Call Center    Phone Message    May a detailed message be left on voicemail: yes     Reason for Call: Other: Pt is scheduled for a Post-Op on 3/31/25, he is wondering if this appt could be moved up any sooner as he wants to get his incisions checked urgently. Are we able to get him in for a sooner appt? Please advise.      Action Taken: Other: [facility]    Travel Screening: Not Applicable     Date of Service: 3/19/25

## 2025-03-20 ENCOUNTER — LAB (OUTPATIENT)
Dept: LAB | Facility: CLINIC | Age: 30
End: 2025-03-20
Payer: COMMERCIAL

## 2025-03-20 DIAGNOSIS — Z13.0 SCREENING FOR ENDOCRINE, NUTRITIONAL, METABOLIC AND IMMUNITY DISORDER: ICD-10-CM

## 2025-03-20 DIAGNOSIS — F64.0 TRANSGENDER MAN ON HORMONE THERAPY: ICD-10-CM

## 2025-03-20 DIAGNOSIS — Z13.29 SCREENING FOR ENDOCRINE, NUTRITIONAL, METABOLIC AND IMMUNITY DISORDER: ICD-10-CM

## 2025-03-20 DIAGNOSIS — Z13.228 SCREENING FOR ENDOCRINE, NUTRITIONAL, METABOLIC AND IMMUNITY DISORDER: ICD-10-CM

## 2025-03-20 DIAGNOSIS — Z13.21 SCREENING FOR ENDOCRINE, NUTRITIONAL, METABOLIC AND IMMUNITY DISORDER: ICD-10-CM

## 2025-03-20 DIAGNOSIS — Z79.899 TRANSGENDER MAN ON HORMONE THERAPY: ICD-10-CM

## 2025-03-20 LAB
HBV SURFACE AB SERPL IA-ACNC: <3.5 M[IU]/ML
HBV SURFACE AB SERPL IA-ACNC: NONREACTIVE M[IU]/ML
HCT VFR BLD AUTO: 39.9 % (ref 35–53)
HGB BLD-MCNC: 12.9 G/DL (ref 11.7–17.7)
SHBG SERPL-SCNC: 17 NMOL/L (ref 11–135)

## 2025-03-20 NOTE — TELEPHONE ENCOUNTER
"Called patient to inquire on incisional site post- total lap hysterectomy, bilateral salping-oophorectomy done 2/18 with Dr. Cruz.     Patient reports their incision on the belly button area was glued and felt this opened prematurely causing exposure and risk for infection. He was seen at urgent care on 3/4 and sent home on doxycyline x 5 days. Was advised to follow-up with surgeon in 3-4 days for recheck, but unable to be scheduled sooner than 3/31 (is scheduled with Dr. Garner). Next opening with Dr. Cruz is 4/14.    Reports continued redness and irritation (not worse but also not improving), drainage (slight yellow), scant odor (improves with washing), and stinging pain to site.     He has been cleaning site once daily and leaves open to air. [facility] urgent care informed him that it wasn't necessary to cover. Patient has been wearing sweat pants to avoid further pressure/irritation. Denies fever.    Patient reports their stomach below the naval is \"a little numb,\" testosterone injections are injected there, approximately 2 inches below naval. Inquired if it is okay to continue injecting there or should move to the sides.     Agreeable to send [employer] message with picture of incisional site/concern. Will route to on-call provider, Dr. Garner, as Dr. Cruz is out of clinic until 3/24.  "

## 2025-03-24 LAB — TESTOST SERPL-MCNC: 7 NG/DL (ref 8–950)

## 2025-03-25 SDOH — HEALTH STABILITY: PHYSICAL HEALTH: ON AVERAGE, HOW MANY MINUTES DO YOU ENGAGE IN EXERCISE AT THIS LEVEL?: 20 MIN

## 2025-03-25 SDOH — HEALTH STABILITY: PHYSICAL HEALTH: ON AVERAGE, HOW MANY DAYS PER WEEK DO YOU ENGAGE IN MODERATE TO STRENUOUS EXERCISE (LIKE A BRISK WALK)?: 1 DAY

## 2025-03-25 ASSESSMENT — SOCIAL DETERMINANTS OF HEALTH (SDOH): HOW OFTEN DO YOU GET TOGETHER WITH FRIENDS OR RELATIVES?: PATIENT DECLINED

## 2025-03-26 ENCOUNTER — OFFICE VISIT (OUTPATIENT)
Dept: FAMILY MEDICINE | Facility: CLINIC | Age: 30
End: 2025-03-26
Payer: COMMERCIAL

## 2025-03-26 VITALS
TEMPERATURE: 97.5 F | BODY MASS INDEX: 34.49 KG/M2 | DIASTOLIC BLOOD PRESSURE: 78 MMHG | RESPIRATION RATE: 12 BRPM | WEIGHT: 202 LBS | OXYGEN SATURATION: 98 % | HEART RATE: 104 BPM | SYSTOLIC BLOOD PRESSURE: 116 MMHG | HEIGHT: 64 IN

## 2025-03-26 DIAGNOSIS — Z00.00 ROUTINE GENERAL MEDICAL EXAMINATION AT A HEALTH CARE FACILITY: Primary | ICD-10-CM

## 2025-03-26 DIAGNOSIS — Z23 NEED FOR VACCINATION: ICD-10-CM

## 2025-03-26 DIAGNOSIS — H50.9 STRABISMUS: ICD-10-CM

## 2025-03-26 DIAGNOSIS — F64.0 TRANSGENDER MAN ON HORMONE THERAPY: ICD-10-CM

## 2025-03-26 DIAGNOSIS — Z79.899 TRANSGENDER MAN ON HORMONE THERAPY: ICD-10-CM

## 2025-03-26 PROCEDURE — 99395 PREV VISIT EST AGE 18-39: CPT | Mod: 25 | Performed by: FAMILY MEDICINE

## 2025-03-26 PROCEDURE — 90471 IMMUNIZATION ADMIN: CPT | Performed by: FAMILY MEDICINE

## 2025-03-26 PROCEDURE — G2211 COMPLEX E/M VISIT ADD ON: HCPCS | Performed by: FAMILY MEDICINE

## 2025-03-26 PROCEDURE — 90746 HEPB VACCINE 3 DOSE ADULT IM: CPT | Performed by: FAMILY MEDICINE

## 2025-03-26 PROCEDURE — 99213 OFFICE O/P EST LOW 20 MIN: CPT | Mod: 25 | Performed by: FAMILY MEDICINE

## 2025-03-26 RX ORDER — TESTOSTERONE CYPIONATE 200 MG/ML
0.5 INJECTION, SOLUTION INTRAMUSCULAR WEEKLY
Qty: 6 ML | Refills: 2 | Status: SHIPPED | OUTPATIENT
Start: 2025-03-26

## 2025-03-26 ASSESSMENT — PATIENT HEALTH QUESTIONNAIRE - PHQ9: SUM OF ALL RESPONSES TO PHQ QUESTIONS 1-9: 0

## 2025-03-26 ASSESSMENT — PAIN SCALES - GENERAL: PAINLEVEL_OUTOF10: MILD PAIN (2)

## 2025-03-26 NOTE — NURSING NOTE
Prior to immunization administration, verified patients identity using patient s name and date of birth. Please see Immunization Activity for additional information.     Screening Questionnaire for Adult Immunization    Are you sick today?   No   Do you have allergies to medications, food, a vaccine component or latex?   No   Have you ever had a serious reaction after receiving a vaccination?   No   Do you have a long-term health problem with heart, lung, kidney, or metabolic disease (e.g., diabetes), asthma, a blood disorder, no spleen, complement component deficiency, a cochlear implant, or a spinal fluid leak?  Are you on long-term aspirin therapy?   No   Do you have cancer, leukemia, HIV/AIDS, or any other immune system problem?   No   Do you have a parent, brother, or sister with an immune system problem?   No   In the past 3 months, have you taken medications that affect  your immune system, such as prednisone, other steroids, or anticancer drugs; drugs for the treatment of rheumatoid arthritis, Crohn s disease, or psoriasis; or have you had radiation treatments?   No   Have you had a seizure, or a brain or other nervous system problem?   No   During the past year, have you received a transfusion of blood or blood    products, or been given immune (gamma) globulin or antiviral drug?   No   For women: Are you pregnant or is there a chance you could become       pregnant during the next month?   No   Have you received any vaccinations in the past 4 weeks?   No     Immunization questionnaire answers were all negative.      Patient instructed to remain in clinic for 15 minutes afterwards, and to report any adverse reactions.     Screening performed by Tammy López RN on 3/26/2025 at 2:54 PM.

## 2025-03-26 NOTE — PROGRESS NOTES
Preventive Care Visit  [facility]  YULIA LAO MD, Family Medicine  Mar 26, 2025    Assessment & Plan     Routine general medical examination at a health care facility  - PRIMARY CARE FOLLOW-UP SCHEDULING; Future    Transgender man on hormone therapy  - testosterone cypionate (DEPOTESTOSTERONE) 200 MG/ML injection; Inject 0.5 mLs (100 mg) subcutaneously once a week.  - Testosterone, total; Future    Strabismus    Need for vaccination  - HEPATITIS B, ADULT 20+ (ENGERIX-B/RECOMBIVAX HB)  Counseling  Appropriate preventive services were addressed with this patient via screening, questionnaire, or discussion as appropriate for fall prevention, nutrition, physical activity, Tobacco-use cessation, social engagement, weight loss and cognition.  Checklist reviewing preventive services available has been given to the patient.  Reviewed patient's diet, addressing concerns and/or questions.   He is at risk for lack of exercise and has been provided with information to increase physical activity for the benefit of his well-being.     Cinda Friedman is a 29 year old, presenting for the following:  Annual Visit (Please discuss recent hysterectomy and removing the birth control.)        3/26/2025     1:42 PM   Additional Questions   Roomed by Brandie ZENG      Took a break on the testosterone due to having the surgery. Ready to restart it.  Gets some tension headaches. Had one ocular migraine since surgery.    Advance Care Planning  Patient does not have a Health Care Directive: Discussed advance care planning with patient; information given to patient to review.      3/25/2025   General Health   How would you rate your overall physical health? (!) FAIR   Feel stress (tense, anxious, or unable to sleep) Only a little   (!) STRESS CONCERN      3/25/2025   Nutrition   Three or more servings of calcium each day? Yes   Diet: Regular (no restrictions)   How many servings of fruit and vegetables per day? (!) 2-3    How many sweetened beverages each day? 0-1         3/25/2025   Exercise   Days per week of moderate/strenous exercise 1 day   Average minutes spent exercising at this level 20 min   (!) EXERCISE CONCERN      3/25/2025   Social Factors   Frequency of gathering with friends or relatives Patient declined   Worry food won't last until get money to buy more Patient declined   Food not last or not have enough money for food? Yes-- knows about a local food bank.   Do you have housing? (Housing is defined as stable permanent housing and does not include staying ouside in a car, in a tent, in an abandoned building, in an overnight shelter, or couch-surfing.) Yes   Are you worried about losing your housing? No   Lack of transportation? Yes   Unable to get utilities (heat,electricity)? No   (!) FOOD SECURITY CONCERN PRESENT (!) TRANSPORTATION CONCERN PRESENT      3/25/2025   Dental   Dentist two times every year? (!) DECLINE. Uncertain is covered.         3/26/2025     1:51 PM   PHQ-9 SCORE   PHQ-9 Total Score 0         3/25/2025   Substance Use   Alcohol more than 3/day or more than 7/wk No   Do you use any other substances recreationally? (!) CANNABIS PRODUCTS     Social History     Tobacco Use    Smoking status: Never    Smokeless tobacco: Never   Vaping Use    Vaping status: Never Used   Substance Use Topics    Alcohol use: Yes     Comment: Never more than one, and goes weeks without.    Drug use: Yes     Frequency: 1.0 times per week     Types: Marijuana           1/17/2024   LAST FHS-7 RESULTS   1st degree relative breast or ovarian cancer No   Any relative bilateral breast cancer No   Any male have breast cancer No   Any ONE woman have BOTH breast AND ovarian cancer No   Any woman with breast cancer before 50yrs No   2 or more relatives with breast AND/OR ovarian cancer No   2 or more relatives with breast AND/OR bowel cancer Unknown   Had gender affirming mastectomy.        3/25/2025   STI Screening   New sexual  partner(s) since last STI/HIV test? No     History of abnormal Pap smear: Status post hysterectomy with removal of cervix and no history of CIN2 or greater or cervical cancer. Health Maintenance and Surgical History updated.        1/17/2024     2:26 PM   PAP / HPV   PAP Negative for Intraepithelial Lesion or Malignancy (NILM)         Reviewed and updated as needed this visit by Provider                  BP Readings from Last 3 Encounters:   03/26/25 116/78   03/04/25 121/81   02/22/25 108/75    Wt Readings from Last 3 Encounters:   03/26/25 91.6 kg (202 lb)   03/04/25 93.9 kg (207 lb)   02/19/25 94 kg (207 lb 3.7 oz)         Patient Active Problem List   Diagnosis    ADHD (attention deficit hyperactivity disorder)    Anxiety    Major depressive disorder, recurrent, in full remission    Gender dysphoria    Migraine with aura and without status migrainosus, not intractable    Acid reflux    Class 1 drug-induced obesity without serious comorbidity with body mass index (BMI) of 34.0 to 34.9 in adult    S/P hysterectomy with oophorectomy    Encounter for gender affirmation procedure     Past Surgical History:   Procedure Laterality Date    ADENOIDECTOMY      COSMETIC PLACEMENT OF DENTAL IMPLANT(S)      DAVINCI HYSTERECTOMY TOTAL, BILATERAL SALPINGO-OOPHORECTOMY, COMBINED N/A 2/18/2025    Procedure: HYSTERECTOMY, TOTAL, LAPAROSCOPIC, WITH SALPINGO-OOPHORECTOMY;  Cystoscopy, Exam Under Anesthesia - Robot Assited, vaginal laceration repair and vaginal packing;  Surgeon: Sandra Cruz MD;  Location: [facility] OR    HC TOOTH EXTRACTION W/FORCEP      MASTECTOMY SIMPLE BILATERAL Bilateral 12/17/2021    Procedure: MASTECTOMY, BILATERAL, SIMPLE, WITH nipple grafts. OnQ;  Surgeon: Lashanda Mulligan MD;  Location: [facility] OR    TONSILLECTOMY         Social History     Tobacco Use    Smoking status: Never    Smokeless tobacco: Never   Substance Use Topics    Alcohol use: Yes     Comment: Never more than one, and goes weeks  "without.     Family History   Problem Relation Age of Onset    Thyroid Disease Mother     Impaired Fasting Glucose Father     No Known Problems Brother     Clotting Disorder Maternal Grandmother     Lung Cancer Maternal Grandmother     Cancer Maternal Grandfather         Colon Ca    Chronic Obstructive Pulmonary Disease Maternal Grandfather     Chronic Obstructive Pulmonary Disease Paternal Grandfather          Current Outpatient Medications   Medication Sig Dispense Refill    acetaminophen (TYLENOL) 325 MG tablet Take 325-650 mg by mouth every 6 hours as needed for mild pain      famotidine (PEPCID) 40 MG tablet Take 1 tablet (40 mg) by mouth 2 times daily. 180 tablet 3    fluticasone (FLONASE) 50 MCG/ACT nasal spray Spray 1 spray into both nostrils daily 9.9 mL 0    ibuprofen (ADVIL/MOTRIN) 200 MG capsule Take 200 mg by mouth every 4 hours as needed for fever       loratadine (CLARITIN) 10 MG tablet Take 1 tablet (10 mg) by mouth daily 30 tablet 0    SUMAtriptan (IMITREX) 25 MG tablet Take 25 mg by mouth at onset of headache for migraine      syringe/needle, sisp, (BD LUER-CLAYTON SYRINGE) 25G X 5/8\" 1 ML MISC Inject 0.4 mLs Subcutaneous once a week 10 each 5    testosterone cypionate (DEPOTESTOSTERONE) 200 MG/ML injection Inject 0.5 mLs (100 mg) subcutaneously once a week. 6 mL 2     Review of Systems  Constitutional, HEENT, cardiovascular, pulmonary, GI, , musculoskeletal, neuro, skin, endocrine and psych systems are negative, except as otherwise noted.     Objective    Exam  /78 (BP Location: Left arm, Patient Position: Sitting, Cuff Size: Adult Regular)   Pulse 104   Temp 97.5  F (36.4  C) (Temporal)   Resp 12   Ht 1.626 m (5' 4.02\")   Wt 91.6 kg (202 lb)   SpO2 98%   BMI 34.66 kg/m     Estimated body mass index is 34.66 kg/m  as calculated from the following:    Height as of this encounter: 1.626 m (5' 4.02\").    Weight as of this encounter: 91.6 kg (202 lb).    Physical Exam  GENERAL: alert and no " distress  EYES: Eyes grossly normal to inspection, PERRL and conjunctivae and sclerae normal  HENT: ear canals and TM's normal, nose and mouth without ulcers or lesions  NECK: no adenopathy, no asymmetry, masses, or scars  RESP: lungs clear to auscultation - no rales, rhonchi or wheezes  CV: regular rate and rhythm, normal S1 S2, no S3 or S4, no murmur, click or rub, no peripheral edema  ABDOMEN: soft, nontender, no hepatosplenomegaly, no masses and bowel sounds normal  MS: no gross musculoskeletal defects noted, no edema  SKIN: no suspicious lesions or rashes  NEURO: Normal strength and tone, mentation intact and speech normal  PSYCH: mentation appears normal, affect normal/bright  Signed Electronically by: YULIA LAO MD

## 2025-03-26 NOTE — PATIENT INSTRUCTIONS
[email or web address]  Patient Education   Preventive Care Advice   This is general advice given by our system to help you stay healthy. However, your care team may have specific advice just for you. Please talk to your care team about your preventive care needs.  Nutrition  Eat 5 or more servings of fruits and vegetables each day.  Try wheat bread, brown rice and whole grain pasta (instead of white bread, rice, and pasta).  Get enough calcium and vitamin D. Check the label on foods and aim for 100% of the RDA (recommended daily allowance).  Lifestyle  Exercise at least 150 minutes each week  (30 minutes a day, 5 days a week).  Do muscle strengthening activities 2 days a week. These help control your weight and prevent disease.  No smoking.  Wear sunscreen to prevent skin cancer.  Have a dental exam and cleaning every 6 months.  Yearly exams  See your health care team every year to talk about:  Any changes in your health.  Any medicines your care team has prescribed.  Preventive care, family planning, and ways to prevent chronic diseases.  Shots (vaccines)   HPV shots (up to age 26), if you've never had them before.  Hepatitis B shots (up to age 59), if you've never had them before.  COVID-19 shot: Get this shot when it's due.  Flu shot: Get a flu shot every year.  Tetanus shot: Get a tetanus shot every 10 years.  Pneumococcal, hepatitis A, and RSV shots: Ask your care team if you need these based on your risk.  Shingles shot (for age 50 and up)  General health tests  Diabetes screening:  Starting at age 35, Get screened for diabetes at least every 3 years.  If you are younger than age 35, ask your care team if you should be screened for diabetes.  Cholesterol test: At age 39, start having a cholesterol test every 5 years, or more often if advised.  Bone density scan (DEXA): At age 50, ask your care team if you should have this scan for osteoporosis  (brittle bones).  Hepatitis C: Get tested at least once in your life.  STIs (sexually transmitted infections)  Before age 24: Ask your care team if you should be screened for STIs.  After age 24: Get screened for STIs if you're at risk. You are at risk for STIs (including HIV) if:  You are sexually active with more than one person.  You don't use condoms every time.  You or a partner was diagnosed with a sexually transmitted infection.  If you are at risk for HIV, ask about PrEP medicine to prevent HIV.  Get tested for HIV at least once in your life, whether you are at risk for HIV or not.  Cancer screening tests  Cervical cancer screening: If you have a cervix, begin getting regular cervical cancer screening tests starting at age 21.  Breast cancer scan (mammogram): If you've ever had breasts, begin having regular mammograms starting at age 40. This is a scan to check for breast cancer.  Colon cancer screening: It is important to start screening for colon cancer at age 45.  Have a colonoscopy test every 10 years (or more often if you're at risk) Or, ask your provider about stool tests like a FIT test every year or Cologuard test every 3 years.  To learn more about your testing options, visit:   .  For help making a decision, visit:   https://bit.ly/qr82533.  Prostate cancer screening test: If you have a prostate, ask your care team if a prostate cancer screening test (PSA) at age 55 is right for you.  Lung cancer screening: If you are a current or former smoker ages 50 to 80, ask your care team if ongoing lung cancer screenings are right for you.  For informational purposes only. Not to replace the advice of your health care provider. Copyright   2023 [employer]. All rights reserved. Clinically reviewed by the [facility] Transitions Program. [employer] 859830 - REV 01/24.  Substance Use Disorder: Care Instructions  Overview     You can improve your life and health by stopping your use of  alcohol or drugs. When you don't drink or use drugs, you may feel and sleep better. You may get along better with your family, friends, and coworkers. There are medicines and programs that can help with substance use disorder.  How can you care for yourself at home?  Here are some ways to help you stay sober and prevent relapse.  If you have been given medicine to help keep you sober or reduce your cravings, be sure to take it exactly as prescribed.  Talk to your doctor about programs that can help you stop using drugs or drinking alcohol.  Do not keep alcohol or drugs in your home.  Plan ahead. Think about what you'll say if other people ask you to drink or use drugs. Try not to spend time with people who drink or use drugs.  Use the time and money spent on drinking or drugs to do something that's important to you.  Preventing a relapse  Have a plan to deal with relapse. Learn to recognize changes in your thinking that lead you to drink or use drugs. Get help before you start to drink or use drugs again.  Try to stay away from situations, friends, or places that may lead you to drink or use drugs.  If you feel the need to drink alcohol or use drugs again, seek help right away. Call a trusted friend or family member. Some people get support from organizations such as Narcotics Anonymous or [employer] or from treatment facilities.  If you relapse, get help as soon as you can. Some people make a plan with another person that outlines what they want that person to do for them if they relapse. The plan usually includes how to handle the relapse and who to notify in case of relapse.  Don't give up. Remember that a relapse doesn't mean that you have failed. Use the experience to learn the triggers that lead you to drink or use drugs. Then quit again. Recovery is a lifelong process. Many people have several relapses before they are able to quit for good.  Follow-up care is a key part of your treatment and safety. Be  "sure to make and go to all appointments, and call your doctor if you are having problems. It's also a good idea to know your test results and keep a list of the medicines you take.  When should you call for help?   Call 911  anytime you think you may need emergency care. For example, call if you or someone else:    Has overdosed or has withdrawal signs. Be sure to tell the emergency workers that you are or someone else is using or trying to quit using drugs. Overdose or withdrawal signs may include:  Losing consciousness.  Seizure.  Seeing or hearing things that aren't there (hallucinations).     Is thinking or talking about suicide or harming others.   Where to get help 24 hours a day, 7 days a week   If you or someone you know talks about suicide, self-harm, a mental health crisis, a substance use crisis, or any other kind of emotional distress, get help right away. You can:    Call the Suicide and Crisis Lifeline at 988.     Call 6-385-060-CEES (1-624.905.7398).     Text HOME to 968308 to access the Crisis Text Line.   Consider saving these numbers in your phone.  Go to [employer] for more information or to chat online.  Call your doctor now or seek immediate medical care if:    You are having withdrawal symptoms. These may include nausea or vomiting, sweating, shakiness, and anxiety.   Watch closely for changes in your health, and be sure to contact your doctor if:    You have a relapse.     You need more help or support to stop.   Where can you learn more?  Go to https://www.[employer].com.net/patiented  Enter H573 in the search box to learn more about \"Substance Use Disorder: Care Instructions.\"  Current as of: August 20, 2024  Content Version: 14.4    8307-8623 [employer]   Care instructions adapted under license by your healthcare professional. If you have questions about a medical condition or this instruction, always ask your healthcare professional. [employer] disclaims any " warranty or liability for your use of this information.

## 2025-03-26 NOTE — NURSING NOTE
Patient tolerated Hep B injection without incident. Injection site free from redness, swelling, and burning. Patient agreeable to remaining in clinic following injection.

## 2025-03-31 ENCOUNTER — OFFICE VISIT (OUTPATIENT)
Dept: OBGYN | Facility: CLINIC | Age: 30
End: 2025-03-31
Attending: STUDENT IN AN ORGANIZED HEALTH CARE EDUCATION/TRAINING PROGRAM
Payer: COMMERCIAL

## 2025-03-31 VITALS
DIASTOLIC BLOOD PRESSURE: 73 MMHG | WEIGHT: 206.1 LBS | SYSTOLIC BLOOD PRESSURE: 130 MMHG | HEIGHT: 64 IN | BODY MASS INDEX: 35.19 KG/M2 | HEART RATE: 97 BPM

## 2025-03-31 DIAGNOSIS — Z48.89 POSTOPERATIVE VISIT: Primary | ICD-10-CM

## 2025-03-31 PROCEDURE — G0463 HOSPITAL OUTPT CLINIC VISIT: HCPCS | Performed by: STUDENT IN AN ORGANIZED HEALTH CARE EDUCATION/TRAINING PROGRAM

## 2025-03-31 RX ORDER — LISDEXAMFETAMINE DIMESYLATE 40 MG/1
CAPSULE ORAL
COMMUNITY
Start: 2025-03-20

## 2025-03-31 ASSESSMENT — PAIN SCALES - GENERAL: PAINLEVEL_OUTOF10: NO PAIN (0)

## 2025-03-31 NOTE — PATIENT INSTRUCTIONS
Thank you for trusting us with your care!   Please be aware, if you are on Mychart, you may see your results prior to your providers review. If labs are abnormal, we will call or message you on [employer] with a follow up plan.    If you need to contact us for questions about:  Symptoms, Scheduling & Medical Questions; Non-urgent (2-3 day response) [employer] message, Urgent (needing response today) [phone] (if after 3:30pm next day response)   Prescriptions: Please call your Pharmacy   Billing: Monique [phone] or [facility]:[phone]

## 2025-03-31 NOTE — NURSING NOTE
Chief Complaint   Patient presents with    Post-op Visit     6 weeks post op, HYSTERECTOMY, TOTAL, LAPAROSCOPIC, WITH SALPINGO-OOPHORECTOMY;  Cystoscopy, Exam Under Anesthesia - Robot Assited, vaginal laceration repair and vaginal packing

## 2025-03-31 NOTE — LETTER
3/31/2025       RE: Elva Aaron  [address] [place] [address]     Dear Colleague,    Thank you for referring your patient, Elva Aaron, to the [facility] [place] at [facility]. Please see a copy of my visit note below.    [facility]  Post-Operative Visit    S: Had one episode of spotting recently after a lot of exertion, otherwise no vaginal bleeding. Voiding spontaneously, passing flatus and BM, pain is well controlled, tolerating food without nausea/vomiting, ambulating independently. No drainage from incisions.    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    Post-operative visit  - Meeting postoperative goals, okay to return to normal activity  - Continue testosterone    Return to clinic in as needed.     Paula Garner MD  [facility], Ob/Gyn  2025 10:24 PM      Again, thank you for allowing me to participate in the care of your patient.      Sincerely,    Paula Garner MD

## 2025-03-31 NOTE — PROGRESS NOTES
[facility]  Post-Operative Visit    S: Had one episode of spotting recently after a lot of exertion, otherwise no vaginal bleeding. Voiding spontaneously, passing flatus and BM, pain is well controlled, tolerating food without nausea/vomiting, ambulating independently. No drainage from incisions.    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    Post-operative visit  - Meeting postoperative goals, okay to return to normal activity  - Continue testosterone    Return to clinic in as needed.     Paula Garner MD  [facility], Ob/Gyn  2025 10:24 PM

## 2025-04-23 DIAGNOSIS — F90.0 ATTENTION DEFICIT HYPERACTIVITY DISORDER (ADHD), PREDOMINANTLY INATTENTIVE TYPE: Primary | ICD-10-CM

## 2025-04-23 RX ORDER — LISDEXAMFETAMINE DIMESYLATE 40 MG/1
40 CAPSULE ORAL EVERY MORNING
Qty: 30 CAPSULE | Refills: 0 | Status: SHIPPED | OUTPATIENT
Start: 2025-04-23

## 2025-05-05 ENCOUNTER — OFFICE VISIT (OUTPATIENT)
Dept: FAMILY MEDICINE | Facility: CLINIC | Age: 30
End: 2025-05-05
Payer: COMMERCIAL

## 2025-05-05 VITALS
HEART RATE: 118 BPM | TEMPERATURE: 98.5 F | OXYGEN SATURATION: 97 % | RESPIRATION RATE: 24 BRPM | WEIGHT: 207.4 LBS | HEIGHT: 64 IN | SYSTOLIC BLOOD PRESSURE: 126 MMHG | DIASTOLIC BLOOD PRESSURE: 74 MMHG | BODY MASS INDEX: 35.41 KG/M2

## 2025-05-05 DIAGNOSIS — Z90.710 S/P HYSTERECTOMY WITH OOPHORECTOMY: Primary | ICD-10-CM

## 2025-05-05 DIAGNOSIS — Z30.46 ENCOUNTER FOR NEXPLANON REMOVAL: ICD-10-CM

## 2025-05-05 DIAGNOSIS — Z90.721 S/P HYSTERECTOMY WITH OOPHORECTOMY: Primary | ICD-10-CM

## 2025-05-05 PROCEDURE — 99207 PR DROP WITH A PROCEDURE: CPT | Mod: 25 | Performed by: FAMILY MEDICINE

## 2025-05-05 PROCEDURE — 11982 REMOVE DRUG IMPLANT DEVICE: CPT | Performed by: FAMILY MEDICINE

## 2025-05-05 NOTE — PROGRESS NOTES
"Assessment & Plan     S/P hysterectomy with oophorectomy  Nexplanon is no longer needed.    Encounter for Nexplanon removal    Cinda Friedman is a 29 year old, presenting for the following health issues:  RECHECK (Nexplanon removal)      5/5/2025    12:41 PM   Additional Questions   Roomed by Antonino VALLADARES RN     History of Present Illness       Reason for visit:  Birth control removal   He is taking medications regularly.          Objective    /74 (BP Location: Left arm, Patient Position: Sitting, Cuff Size: Adult Regular)   Pulse 118   Temp 98.5  F (36.9  C) (Tympanic)   Resp 24   Ht 1.626 m (5' 4\")   Wt 94.1 kg (207 lb 6.4 oz)   LMP  (LMP Unknown)   SpO2 97%   BMI 35.60 kg/m    Body mass index is 35.6 kg/m .  Physical Exam   GENERAL: healthy, alert and no distress  EYES: grossly normal to inspection, and conjunctivae and sclerae normal  RESP: breathing unlabored, no cough or throat clearing.  MS: no gross musculoskeletal defects noted.  SKIN: no suspicious lesions or rashes visible. The Nexplanon was easily palpable, but about 2 cm lower than originally placed.  NEURO: Normal strength and tone, mentation intact and speech normal  PSYCH: mentation appears normal, affect normal/bright   Signed Electronically by: YULIA LAO MD    "

## 2025-05-06 NOTE — PROCEDURES
The risks and benefits were discussed and consent signed.    EMLA cream was applied over the Nexplanon and distal. After 10 minutes, the cream was wiped off and scrubbed with chlorhexadine. The skin distal to the point of the Nexplanon was incised 2 mm. I attempted to get the device to pop through the incision, but was not successful. So another incision three millimeters more distal was made and I was able to manipulate the device through the incision. It required firm traction to remove.  Benzoin and a steri strip was applied.  Telfa and coban applied to the area.  This was well tolerated and estimated blood loss was <0.5 ml.

## 2025-05-22 DIAGNOSIS — F90.0 ATTENTION DEFICIT HYPERACTIVITY DISORDER (ADHD), PREDOMINANTLY INATTENTIVE TYPE: ICD-10-CM

## 2025-05-22 RX ORDER — LISDEXAMFETAMINE DIMESYLATE 40 MG/1
40 CAPSULE ORAL EVERY MORNING
Qty: 30 CAPSULE | Refills: 0 | Status: SHIPPED | OUTPATIENT
Start: 2025-05-22

## 2025-05-22 NOTE — LETTER
[facility]  05/22/25  Patient: Elva Aaron  YOB: 1995  Medical Record Number: [id number]                                                                                  Non-Opioid Controlled Substance Agreement  This is an agreement between you and your provider regarding safe and appropriate use of controlled substances prescribed by your care team. Controlled substances are?medicines that can cause physical and mental dependence (abuse).     There are strict laws about having and using these medicines. We here at [facility] are  committed to working with you in your efforts to get better. To support you in this work, we'll help you schedule regular office appointments for medicine refills. If we must cancel or change your appointment for any reason, we'll make sure you have enough medicine to last until your next appointment.     As a Provider, I will:   Listen carefully to your concerns while treating you with respect.   Recommend a treatment plan that I believe is in your best interest and may involve therapies other than medicine.    Talk with you often about the possible benefits and the risk of harm of any medicine that we prescribe for you.  Assess the safety of this medicine and check how well it works.    Provide a plan on how to taper (discontinue or go off) using this medicine if the decision is made to stop its use.    ::  As a Patient, I understand controlled substances:     Are prescribed by my care provider to help me function or work and manage my condition(s).?  Are strong medicines and can cause serious side effects.     Need to be taken exactly as prescribed.?Combining controlled substances with certain medicines or chemicals (such as illegal drugs, alcohol, sedatives, sleeping pills, and benzodiazepines) can be dangerous or even fatal.? If I stop taking my medicines suddenly, I may have severe withdrawal symptoms.     Diagnosis: ADHD  Medication :  Vyvanse 40 mg  Quantity per month: 30  Number of refills before follow up visit: 6 months - may be evisit for refill    The risks, benefits, and side effects of these medicine(s) were explained to me. I agree that:    I will take part in other treatments as advised by my care team. This may be psychiatry or counseling, physical therapy, behavioral therapy, group treatment or a referral to specialist.  I will keep all my appointments and understand this is part of the monitoring of controlled substances.?My care team may require an office visit for EVERY controlled substance refill. If I miss appointments or don t follow instructions, my care team may stop my medicine  I will take my medicines as prescribed. I will not change the dose or schedule unless my care team tells me to. There will be no refills if I run out early.    I may be asked to come to the clinic and complete a urine drug test or complete a pill count. If I don t give a urine sample or participate in a pill count, the care team may stop my medicine.    I will only receive controlled substance prescriptions from this clinic. If I am treated by another provider, I will tell them that I am taking controlled substances and that I have a treatment agreement with this provider. I will inform my [facility] care team within one business day if I am given a prescription for any controlled substance by another healthcare provider. My [facility] care team can contact other providers and pharmacists about my use of any medicines.  It is up to me to make sure that I don't run out of my medicines on weekends or holidays.?If my care team is willing to refill my prescription without a visit, I must request refills only during office hours. Refills may take up to 3 business days to process. I will use one pharmacy to fill all my controlled substance prescriptions. I will notify the clinic about any changes to my insurance or medicine availability.  I am  responsible for my prescriptions. If the medicine/prescription is lost, stolen or destroyed, it will not be replaced.?I also agree not to share controlled substance medicines with anyone.   I am aware I should not use any illegal or recreational drugs. I agree not to drink alcohol unless my care team says I can.   If I enroll in the [place] Medical Cannabis program, I will tell my care team before my next refill.  I will tell my care team right away if I become pregnant, have a new medical problem treated outside of my regular clinic, or have a change in my medicines.   I understand that this medicine can affect my thinking, judgment and reaction time.? Alcohol and drugs affect the brain and body, which can affect the safety of my driving. Being under the influence of alcohol or drugs can affect my decision-making, behaviors, personal safety and the safety of others. Driving while impaired (DWI) can occur if a person is driving, operating or in physical control of a car, motorcycle, boat, snowmobile, ATV, motorbike, off-road vehicle or any other motor vehicle ([place] Statute 169[place].20). I understand the risk if I choose to drive or operate any vehicle or machinery.    I understand that if I do not follow any of the conditions above, my prescriptions or treatment may be stopped or changed.   I agree that my provider, clinic care team and pharmacy may work with any city, state or federal law enforcement agency that investigates the misuse, sale or other diversion of my controlled medicine. I will allow my provider to discuss my care with, or share a copy of, this agreement with any other treating provider, pharmacy or emergency room where I receive care.     I have read this agreement and have asked questions about anything I did not understand.    ________________________________________________________  Patient Signature - Zed E Agnieszka     ___________________                   Date      ________________________________________________________  Provider Signature - YULIA TASHIA, MD       ___________________                   Date     ________________________________________________________  Witness Signature (required if provider not present while patient signing)          ___________________                   Date

## 2025-06-05 DIAGNOSIS — F90.0 ATTENTION DEFICIT HYPERACTIVITY DISORDER (ADHD), PREDOMINANTLY INATTENTIVE TYPE: Primary | ICD-10-CM

## 2025-06-05 RX ORDER — LISDEXAMFETAMINE DIMESYLATE 40 MG/1
40 CAPSULE ORAL DAILY
Qty: 30 CAPSULE | Refills: 0 | Status: SHIPPED | OUTPATIENT
Start: 2025-07-05 | End: 2025-08-04

## 2025-06-05 RX ORDER — LISDEXAMFETAMINE DIMESYLATE 40 MG/1
40 CAPSULE ORAL DAILY
Qty: 30 CAPSULE | Refills: 0 | Status: SHIPPED | OUTPATIENT
Start: 2025-06-05 | End: 2025-07-05

## 2025-06-05 RX ORDER — LISDEXAMFETAMINE DIMESYLATE 40 MG/1
40 CAPSULE ORAL DAILY
Qty: 30 CAPSULE | Refills: 0 | Status: SHIPPED | OUTPATIENT
Start: 2025-08-04 | End: 2025-09-03

## 2025-06-30 ENCOUNTER — LAB (OUTPATIENT)
Dept: LAB | Facility: CLINIC | Age: 30
End: 2025-06-30
Payer: COMMERCIAL

## 2025-06-30 DIAGNOSIS — F64.0 TRANSGENDER MAN ON HORMONE THERAPY: ICD-10-CM

## 2025-06-30 DIAGNOSIS — Z79.899 TRANSGENDER MAN ON HORMONE THERAPY: ICD-10-CM

## 2025-06-30 PROCEDURE — 84403 ASSAY OF TOTAL TESTOSTERONE: CPT

## 2025-06-30 PROCEDURE — 36415 COLL VENOUS BLD VENIPUNCTURE: CPT

## 2025-07-02 LAB — TESTOST SERPL-MCNC: 127 NG/DL (ref 8–950)

## 2025-08-13 ENCOUNTER — VIRTUAL VISIT (OUTPATIENT)
Dept: FAMILY MEDICINE | Facility: CLINIC | Age: 30
End: 2025-08-13
Payer: COMMERCIAL

## 2025-08-13 DIAGNOSIS — E66.01 CLASS 2 SEVERE OBESITY WITH BODY MASS INDEX (BMI) OF 35 TO 39.9 WITH SERIOUS COMORBIDITY (H): ICD-10-CM

## 2025-08-13 DIAGNOSIS — F90.0 ATTENTION DEFICIT HYPERACTIVITY DISORDER (ADHD), PREDOMINANTLY INATTENTIVE TYPE: Primary | ICD-10-CM

## 2025-08-13 DIAGNOSIS — F64.0 TRANSGENDER MAN ON HORMONE THERAPY: ICD-10-CM

## 2025-08-13 DIAGNOSIS — E66.812 CLASS 2 SEVERE OBESITY WITH BODY MASS INDEX (BMI) OF 35 TO 39.9 WITH SERIOUS COMORBIDITY (H): ICD-10-CM

## 2025-08-13 DIAGNOSIS — Z79.899 TRANSGENDER MAN ON HORMONE THERAPY: ICD-10-CM

## 2025-08-13 PROCEDURE — 98006 SYNCH AUDIO-VIDEO EST MOD 30: CPT | Performed by: FAMILY MEDICINE

## 2025-08-13 RX ORDER — DEXTROAMPHETAMINE SACCHARATE, AMPHETAMINE ASPARTATE MONOHYDRATE, DEXTROAMPHETAMINE SULFATE AND AMPHETAMINE SULFATE 5; 5; 5; 5 MG/1; MG/1; MG/1; MG/1
20 CAPSULE, EXTENDED RELEASE ORAL DAILY
Qty: 30 CAPSULE | Refills: 0 | Status: SHIPPED | OUTPATIENT
Start: 2025-08-13 | End: 2025-09-12

## 2025-08-13 RX ORDER — NEEDLES, DISPOSABLE 25GX5/8"
0.2 NEEDLE, DISPOSABLE MISCELLANEOUS WEEKLY
Qty: 12 EACH | Refills: 11 | Status: SHIPPED | OUTPATIENT
Start: 2025-08-13

## 2025-08-13 RX ORDER — NEEDLES, DISPOSABLE 25GX5/8"
0.4 NEEDLE, DISPOSABLE MISCELLANEOUS WEEKLY
Qty: 12 EACH | Refills: 11 | Status: SHIPPED | OUTPATIENT
Start: 2025-08-13

## 2025-08-13 RX ORDER — NEEDLES, SAFETY 18GX1 1/2"
0.4 NEEDLE, DISPOSABLE MISCELLANEOUS WEEKLY
Qty: 10 EACH | Refills: 5 | Status: CANCELLED | OUTPATIENT
Start: 2025-08-13

## 2025-08-13 RX ORDER — SYRINGE, DISPOSABLE, 1 ML
0.2 SYRINGE, EMPTY DISPOSABLE MISCELLANEOUS WEEKLY
Qty: 12 EACH | Refills: 5 | Status: SHIPPED | OUTPATIENT
Start: 2025-08-13

## 2025-08-13 RX ORDER — TESTOSTERONE CYPIONATE 200 MG/ML
0.7 INJECTION, SOLUTION INTRAMUSCULAR WEEKLY
Qty: 12 ML | Refills: 1 | Status: SHIPPED | OUTPATIENT
Start: 2025-08-13

## 2025-08-13 RX ORDER — DEXTROAMPHETAMINE SACCHARATE, AMPHETAMINE ASPARTATE MONOHYDRATE, DEXTROAMPHETAMINE SULFATE AND AMPHETAMINE SULFATE 5; 5; 5; 5 MG/1; MG/1; MG/1; MG/1
20 CAPSULE, EXTENDED RELEASE ORAL DAILY
Qty: 30 CAPSULE | Refills: 0 | Status: SHIPPED | OUTPATIENT
Start: 2025-09-12 | End: 2025-10-12

## 2025-08-13 RX ORDER — FINASTERIDE 1 MG/1
1 TABLET, FILM COATED ORAL DAILY
Qty: 90 TABLET | Refills: 3 | Status: SHIPPED | OUTPATIENT
Start: 2025-08-13

## 2025-08-13 RX ORDER — DEXTROAMPHETAMINE SACCHARATE, AMPHETAMINE ASPARTATE MONOHYDRATE, DEXTROAMPHETAMINE SULFATE AND AMPHETAMINE SULFATE 5; 5; 5; 5 MG/1; MG/1; MG/1; MG/1
20 CAPSULE, EXTENDED RELEASE ORAL DAILY
Qty: 30 CAPSULE | Refills: 0 | Status: SHIPPED | OUTPATIENT
Start: 2025-10-12 | End: 2025-11-11

## (undated) DEVICE — PREP CHLORAPREP 26ML TINTED ORANGE  260815

## (undated) DEVICE — ESU GROUND PAD ADULT W/CORD E7507

## (undated) DEVICE — BNDG ELASTIC 6"X5YDS UNSTERILE 6611-60

## (undated) DEVICE — SYR 50ML LL W/O NDL 309653

## (undated) DEVICE — PACK MINOR CUSTOM ASC

## (undated) DEVICE — PREP DYNA-HEX 4% CHG SCRUB 4OZ BOTTLE MDS098710

## (undated) DEVICE — SUCTION TIP YANKAUER STR K87

## (undated) DEVICE — RX BACITRACIN OINTMENT 14G 0.5OZ 45802-060-01

## (undated) DEVICE — DAVINCI XI DRAPE ARM 470015

## (undated) DEVICE — DAVINCI XI GRASPER PROGRASP 8MM EXT 471093

## (undated) DEVICE — SU VICRYL 3-0 FS-1 27" J442H

## (undated) DEVICE — BLADE SWITCH SCISSORS TIP 5MM 89-5100B

## (undated) DEVICE — BNDG ELASTIC 6" DBL LENGTH UNSTERILE 6611-16

## (undated) DEVICE — DRSG ABDOMINAL 07 1/2X8" 7197D

## (undated) DEVICE — TUBING FILTER TRI-LUMEN AIRSEAL ASC-EVAC1

## (undated) DEVICE — DAVINCI XI SEAL UNIVERSAL 5-12MM 470500

## (undated) DEVICE — SYR 10ML LL W/O NDL 302995

## (undated) DEVICE — SU DERMABOND ADVANCED .7ML DNX12

## (undated) DEVICE — DRAPE UNDER BUTTOCK 8483

## (undated) DEVICE — SUCTION MANIFOLD NEPTUNE 2 SYS 4 PORT 0702-020-000

## (undated) DEVICE — PAD CHUX UNDERPAD 30X30"

## (undated) DEVICE — PEN MARKING SKIN TYCO DEVON DUAL TIP 31145868

## (undated) DEVICE — DRSG KERLIX 4 1/2"X4YDS ROLL 6730

## (undated) DEVICE — NDL INSUFFLATION 13GA 120MM C2201

## (undated) DEVICE — PEN MARKING SKIN W/LABELS 31145884

## (undated) DEVICE — DRAPE LAP TRANSVERSE 29421

## (undated) DEVICE — SU VICRYL 4-0 PS-2 18" UND J496H

## (undated) DEVICE — SU VICRYL+ 0 27 UR6 VLT VCP603H

## (undated) DEVICE — SOL ADH LIQUID BENZOIN SWAB 0.6ML C1544

## (undated) DEVICE — ENDO TROCAR CONMED AIRSEAL BLADELESS 08X120MM IAS8-120LP

## (undated) DEVICE — GLOVE BIOGEL PI ULTRATOUCH G SZ 6.5 42165

## (undated) DEVICE — LINEN TOWEL PACK X30 5481

## (undated) DEVICE — TUBING IRRIG CYSTO/BLADDER SET 81" LF 2C4040

## (undated) DEVICE — CLOSURE SYS SKIN PREMIERPRO EXOFINFUSION 4X60CM 3473

## (undated) DEVICE — SPONGE RAY-TEC 4X8" 7318

## (undated) DEVICE — SU MONOCRYL 4-0 PS-2 18" UND Y496G

## (undated) DEVICE — DRAIN JACKSON PRATT 15FR ROUND SU130-1323

## (undated) DEVICE — UTERINE MANIPULATOR RUMI 5.1MMX6CM UML516

## (undated) DEVICE — ADAPTER DRAPE ALLY AU-AD

## (undated) DEVICE — DRSG COTTON BALL 6PK LCB62

## (undated) DEVICE — BAG URIMETER FOLEY KIT W/16FR FOLEY

## (undated) DEVICE — SU STRATAFIX PDS PLUS 0 CT-1 30CM SXPP1B450

## (undated) DEVICE — SOL WATER IRRIG 1000ML BOTTLE 2F7114

## (undated) DEVICE — ESU ELEC BLADE HEX-LOCKING 2.5" E1450X

## (undated) DEVICE — GLOVE BIOGEL PI MICRO INDICATOR UNDERGLOVE SZ 7.0 48970

## (undated) DEVICE — SPONGE PACK VAGINAL 2"X9

## (undated) DEVICE — CATH TRAY FOLEY SURESTEP 16FR WDRAIN BAG STLK LATEX A300316A

## (undated) DEVICE — WIPES FOLEY CARE SURESTEP PROVON DFC100

## (undated) DEVICE — SU SILK 2-0 SH 30" K833H

## (undated) DEVICE — DAVINCI HOT SHEARS TIP COVER  400180

## (undated) DEVICE — SPONGE LAP 18X18" X8435

## (undated) DEVICE — TUBING SUCTION MEDI-VAC 1/4"X20' N620A

## (undated) DEVICE — COVER CAMERA IN-LIGHT DISP LT-C02

## (undated) DEVICE — SU ETHILON 3-0 PS-1 18" 1663H

## (undated) DEVICE — ENDO POUCH UNIVERSAL RETRIEVAL SYSTEM INZII 5MM CD003

## (undated) DEVICE — LINEN TOWEL PACK X5 5464

## (undated) DEVICE — NDL 18GA 1.5" 305196

## (undated) DEVICE — STPL SKIN 35W APPOSE 8886803712

## (undated) DEVICE — GLOVE PROTEXIS MICRO 6.5  2D73PM65

## (undated) DEVICE — BLADE KNIFE SURG 10 371110

## (undated) DEVICE — DAVINCI XI ESU FORCE BIPOLAR 8MM 471405

## (undated) DEVICE — SOL NACL 0.9% IRRIG 500ML BOTTLE 2F7123

## (undated) DEVICE — CLEANER INST PRE-KLENZ SOAK SHIELD TUBE 6 ML MEDIUM 2D66J4

## (undated) DEVICE — DAVINCI XI MONOPOLAR SCISSORS HOT SHEARS 8MM 470179

## (undated) DEVICE — ANTIFOG SOLUTION SEE SHARP 150M TROCAR SWABS 30978 (COI)

## (undated) DEVICE — BLADE KNIFE SURG 11 371111

## (undated) DEVICE — DAVINCI XI DRAPE COLUMN 470341

## (undated) DEVICE — STRAP KNEE/BODY 31143004

## (undated) DEVICE — SUCTION MANIFOLD NEPTUNE 2 SYS 1 PORT 702-025-000

## (undated) DEVICE — SOL NACL 0.9% INJ 1000ML BAG 2B1324X

## (undated) DEVICE — Device

## (undated) DEVICE — PAD PERI INDIV WRAP 11" NON241286

## (undated) DEVICE — PAD CHUX UNDERPAD 30X36" P3036C

## (undated) DEVICE — DAVINCI XI DRIVER NDL LARGE 8MM EXT 471006

## (undated) DEVICE — DAVINCI XI OBTURATOR BLADELESS 8MM 470359

## (undated) DEVICE — ESU PENCIL SMOKE EVAC W/ROCKER SWITCH 0703-047-000

## (undated) DEVICE — DRAIN JACKSON PRATT RESERVOIR 100ML SU130-1305

## (undated) DEVICE — SUCTION TIP YANKAUER W/O VENT K86

## (undated) DEVICE — DEVICE SUTURE PASSER 14GA WECK EFX EFXSP2

## (undated) DEVICE — KIT POSITIONER PINK PAD XL ADVANCED 40588

## (undated) DEVICE — LINEN GOWN X4 5410

## (undated) RX ORDER — PROPOFOL 10 MG/ML
INJECTION, EMULSION INTRAVENOUS
Status: DISPENSED
Start: 2021-12-17

## (undated) RX ORDER — CEFAZOLIN SODIUM 1 G/3ML
INJECTION, POWDER, FOR SOLUTION INTRAMUSCULAR; INTRAVENOUS
Status: DISPENSED
Start: 2021-12-17

## (undated) RX ORDER — METRONIDAZOLE 500 MG/100ML
INJECTION, SOLUTION INTRAVENOUS
Status: DISPENSED
Start: 2025-02-18

## (undated) RX ORDER — ACETAMINOPHEN 325 MG/1
TABLET ORAL
Status: DISPENSED
Start: 2021-12-17

## (undated) RX ORDER — ONDANSETRON 2 MG/ML
INJECTION INTRAMUSCULAR; INTRAVENOUS
Status: DISPENSED
Start: 2025-02-18

## (undated) RX ORDER — LIDOCAINE HYDROCHLORIDE 10 MG/ML
INJECTION, SOLUTION EPIDURAL; INFILTRATION; INTRACAUDAL; PERINEURAL
Status: DISPENSED
Start: 2022-06-24

## (undated) RX ORDER — FENTANYL CITRATE-0.9 % NACL/PF 10 MCG/ML
PLASTIC BAG, INJECTION (ML) INTRAVENOUS
Status: DISPENSED
Start: 2021-12-17

## (undated) RX ORDER — FENTANYL CITRATE 50 UG/ML
INJECTION, SOLUTION INTRAMUSCULAR; INTRAVENOUS
Status: DISPENSED
Start: 2025-02-18

## (undated) RX ORDER — TRIAMCINOLONE ACETONIDE 40 MG/ML
INJECTION, SUSPENSION INTRA-ARTICULAR; INTRAMUSCULAR
Status: DISPENSED
Start: 2022-10-04

## (undated) RX ORDER — PROPOFOL 10 MG/ML
INJECTION, EMULSION INTRAVENOUS
Status: DISPENSED
Start: 2025-02-18

## (undated) RX ORDER — HYDROMORPHONE HYDROCHLORIDE 1 MG/ML
INJECTION, SOLUTION INTRAMUSCULAR; INTRAVENOUS; SUBCUTANEOUS
Status: DISPENSED
Start: 2021-12-17

## (undated) RX ORDER — TRIAMCINOLONE ACETONIDE 40 MG/ML
INJECTION, SUSPENSION INTRA-ARTICULAR; INTRAMUSCULAR
Status: DISPENSED
Start: 2022-08-02

## (undated) RX ORDER — GLYCOPYRROLATE 0.2 MG/ML
INJECTION INTRAMUSCULAR; INTRAVENOUS
Status: DISPENSED
Start: 2021-12-17

## (undated) RX ORDER — LIDOCAINE 40 MG/G
CREAM TOPICAL
Status: DISPENSED
Start: 2025-02-18

## (undated) RX ORDER — DEXAMETHASONE SODIUM PHOSPHATE 4 MG/ML
INJECTION, SOLUTION INTRA-ARTICULAR; INTRALESIONAL; INTRAMUSCULAR; INTRAVENOUS; SOFT TISSUE
Status: DISPENSED
Start: 2025-02-18

## (undated) RX ORDER — OXYCODONE HYDROCHLORIDE 5 MG/1
TABLET ORAL
Status: DISPENSED
Start: 2025-02-18

## (undated) RX ORDER — LIDOCAINE HYDROCHLORIDE 10 MG/ML
INJECTION, SOLUTION EPIDURAL; INFILTRATION; INTRACAUDAL; PERINEURAL
Status: DISPENSED
Start: 2022-10-04

## (undated) RX ORDER — EPHEDRINE SULFATE 50 MG/ML
INJECTION, SOLUTION INTRAMUSCULAR; INTRAVENOUS; SUBCUTANEOUS
Status: DISPENSED
Start: 2021-12-17

## (undated) RX ORDER — SCOPOLAMINE 1 MG/3D
PATCH, EXTENDED RELEASE TRANSDERMAL
Status: DISPENSED
Start: 2025-02-18

## (undated) RX ORDER — CEFAZOLIN SODIUM/WATER 2 G/20 ML
SYRINGE (ML) INTRAVENOUS
Status: DISPENSED
Start: 2025-02-18

## (undated) RX ORDER — FENTANYL CITRATE 50 UG/ML
INJECTION, SOLUTION INTRAMUSCULAR; INTRAVENOUS
Status: DISPENSED
Start: 2021-12-17

## (undated) RX ORDER — FENTANYL CITRATE-0.9 % NACL/PF 10 MCG/ML
PLASTIC BAG, INJECTION (ML) INTRAVENOUS
Status: DISPENSED
Start: 2025-02-18

## (undated) RX ORDER — PHENAZOPYRIDINE HYDROCHLORIDE 200 MG/1
TABLET, FILM COATED ORAL
Status: DISPENSED
Start: 2025-02-18

## (undated) RX ORDER — HYDROMORPHONE HYDROCHLORIDE 1 MG/ML
INJECTION, SOLUTION INTRAMUSCULAR; INTRAVENOUS; SUBCUTANEOUS
Status: DISPENSED
Start: 2025-02-18

## (undated) RX ORDER — ACETAMINOPHEN 325 MG/1
TABLET ORAL
Status: DISPENSED
Start: 2025-02-18

## (undated) RX ORDER — OXYCODONE HYDROCHLORIDE 5 MG/1
TABLET ORAL
Status: DISPENSED
Start: 2021-12-17

## (undated) RX ORDER — LIDOCAINE HYDROCHLORIDE 10 MG/ML
INJECTION, SOLUTION EPIDURAL; INFILTRATION; INTRACAUDAL; PERINEURAL
Status: DISPENSED
Start: 2022-08-02

## (undated) RX ORDER — LIDOCAINE HYDROCHLORIDE 20 MG/ML
INJECTION, SOLUTION EPIDURAL; INFILTRATION; INTRACAUDAL; PERINEURAL
Status: DISPENSED
Start: 2021-12-17

## (undated) RX ORDER — DEXAMETHASONE SODIUM PHOSPHATE 4 MG/ML
INJECTION, SOLUTION INTRA-ARTICULAR; INTRALESIONAL; INTRAMUSCULAR; INTRAVENOUS; SOFT TISSUE
Status: DISPENSED
Start: 2021-12-17

## (undated) RX ORDER — KETOROLAC TROMETHAMINE 30 MG/ML
INJECTION, SOLUTION INTRAMUSCULAR; INTRAVENOUS
Status: DISPENSED
Start: 2021-12-17

## (undated) RX ORDER — BUPIVACAINE HYDROCHLORIDE 2.5 MG/ML
INJECTION, SOLUTION EPIDURAL; INFILTRATION; INTRACAUDAL
Status: DISPENSED
Start: 2021-12-17

## (undated) RX ORDER — TRIAMCINOLONE ACETONIDE 40 MG/ML
INJECTION, SUSPENSION INTRA-ARTICULAR; INTRAMUSCULAR
Status: DISPENSED
Start: 2022-06-24

## (undated) RX ORDER — ONDANSETRON 4 MG/1
TABLET, ORALLY DISINTEGRATING ORAL
Status: DISPENSED
Start: 2021-12-17

## (undated) RX ORDER — ONDANSETRON 2 MG/ML
INJECTION INTRAMUSCULAR; INTRAVENOUS
Status: DISPENSED
Start: 2021-12-17

## (undated) RX ORDER — APREPITANT 40 MG/1
CAPSULE ORAL
Status: DISPENSED
Start: 2021-12-17